# Patient Record
Sex: FEMALE | Race: WHITE
[De-identification: names, ages, dates, MRNs, and addresses within clinical notes are randomized per-mention and may not be internally consistent; named-entity substitution may affect disease eponyms.]

---

## 2018-08-29 ENCOUNTER — HOSPITAL ENCOUNTER (OUTPATIENT)
Dept: HOSPITAL 8 - RAD | Age: 53
Discharge: HOME | End: 2018-08-29
Attending: ORTHOPAEDIC SURGERY
Payer: MEDICARE

## 2018-08-29 DIAGNOSIS — M79.661: Primary | ICD-10-CM

## 2018-08-29 DIAGNOSIS — M79.662: ICD-10-CM

## 2018-08-29 DIAGNOSIS — R60.9: ICD-10-CM

## 2018-08-29 PROCEDURE — 93970 EXTREMITY STUDY: CPT

## 2019-03-31 ENCOUNTER — HOSPITAL ENCOUNTER (INPATIENT)
Dept: HOSPITAL 8 - ED | Age: 54
LOS: 3 days | Discharge: HOME | DRG: 871 | End: 2019-04-03
Attending: HOSPITALIST | Admitting: HOSPITALIST
Payer: MEDICAID

## 2019-03-31 VITALS — WEIGHT: 209.44 LBS | BODY MASS INDEX: 33.66 KG/M2 | HEIGHT: 66 IN

## 2019-03-31 VITALS — SYSTOLIC BLOOD PRESSURE: 150 MMHG | DIASTOLIC BLOOD PRESSURE: 110 MMHG

## 2019-03-31 VITALS — DIASTOLIC BLOOD PRESSURE: 108 MMHG | SYSTOLIC BLOOD PRESSURE: 159 MMHG

## 2019-03-31 VITALS — SYSTOLIC BLOOD PRESSURE: 149 MMHG | DIASTOLIC BLOOD PRESSURE: 97 MMHG

## 2019-03-31 VITALS — SYSTOLIC BLOOD PRESSURE: 156 MMHG | DIASTOLIC BLOOD PRESSURE: 108 MMHG

## 2019-03-31 VITALS — SYSTOLIC BLOOD PRESSURE: 161 MMHG | DIASTOLIC BLOOD PRESSURE: 105 MMHG

## 2019-03-31 DIAGNOSIS — A41.9: Primary | ICD-10-CM

## 2019-03-31 DIAGNOSIS — G82.50: ICD-10-CM

## 2019-03-31 DIAGNOSIS — F17.210: ICD-10-CM

## 2019-03-31 DIAGNOSIS — R65.20: ICD-10-CM

## 2019-03-31 DIAGNOSIS — Z88.8: ICD-10-CM
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
## 2019-03-31 DIAGNOSIS — S39.012A: ICD-10-CM

## 2019-03-31 DIAGNOSIS — D69.6: ICD-10-CM

## 2019-03-31 DIAGNOSIS — I44.7: ICD-10-CM

## 2019-03-31 DIAGNOSIS — Z86.73: ICD-10-CM

## 2019-03-31 DIAGNOSIS — Z80.9: ICD-10-CM

## 2019-03-31 DIAGNOSIS — E83.42: ICD-10-CM

## 2019-03-31 DIAGNOSIS — J18.1: ICD-10-CM

## 2019-03-31 DIAGNOSIS — K74.60: ICD-10-CM

## 2019-03-31 DIAGNOSIS — J84.89: ICD-10-CM
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
## 2019-03-31 DIAGNOSIS — J96.00: ICD-10-CM

## 2019-03-31 DIAGNOSIS — Z88.6: ICD-10-CM

## 2019-03-31 DIAGNOSIS — R59.9: ICD-10-CM
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
## 2019-03-31 DIAGNOSIS — E83.51: ICD-10-CM

## 2019-03-31 LAB
ALBUMIN SERPL-MCNC: 2.9 G/DL (ref 3.4–5)
ALP SERPL-CCNC: 106 U/L (ref 45–117)
ALT SERPL-CCNC: 22 U/L (ref 12–78)
ANION GAP SERPL CALC-SCNC: 9 MMOL/L (ref 5–15)
BASOPHILS # BLD AUTO: 0.02 X10^3/UL (ref 0–0.1)
BASOPHILS NFR BLD AUTO: 0 % (ref 0–1)
BILIRUB SERPL-MCNC: 2.5 MG/DL (ref 0.2–1)
CALCIUM SERPL-MCNC: 7.5 MG/DL (ref 8.5–10.1)
CHLORIDE SERPL-SCNC: 111 MMOL/L (ref 98–107)
CREAT SERPL-MCNC: 0.75 MG/DL (ref 0.55–1.02)
EOSINOPHIL # BLD AUTO: 0.03 X10^3/UL (ref 0–0.4)
EOSINOPHIL NFR BLD AUTO: 0 % (ref 1–7)
ERYTHROCYTE [DISTWIDTH] IN BLOOD BY AUTOMATED COUNT: 15 % (ref 9.6–15.2)
INR PPP: 1.19 (ref 0.93–1.1)
LYMPHOCYTES # BLD AUTO: 1.07 X10^3/UL (ref 1–3.4)
LYMPHOCYTES NFR BLD AUTO: 17 % (ref 22–44)
MCH RBC QN AUTO: 32.2 PG (ref 27–34.8)
MCHC RBC AUTO-ENTMCNC: 34.5 G/DL (ref 32.4–35.8)
MCV RBC AUTO: 93.4 FL (ref 80–100)
MD: NO
MONOCYTES # BLD AUTO: 0.55 X10^3/UL (ref 0.2–0.8)
MONOCYTES NFR BLD AUTO: 9 % (ref 2–9)
NEUTROPHILS # BLD AUTO: 4.84 X10^3/UL (ref 1.8–6.8)
NEUTROPHILS NFR BLD AUTO: 74 % (ref 42–75)
PLATELET # BLD AUTO: 105 X10^3/UL (ref 130–400)
PMV BLD AUTO: 7.8 FL (ref 7.4–10.4)
PROT SERPL-MCNC: 6.8 G/DL (ref 6.4–8.2)
PROTHROMBIN TIME: 12.4 SECONDS (ref 9.6–11.5)
RBC # BLD AUTO: 4 X10^6/UL (ref 3.82–5.3)
TROPONIN I SERPL-MCNC: < 0.015 NG/ML (ref 0–0.04)

## 2019-03-31 PROCEDURE — 83735 ASSAY OF MAGNESIUM: CPT

## 2019-03-31 PROCEDURE — 71250 CT THORAX DX C-: CPT

## 2019-03-31 PROCEDURE — 84145 PROCALCITONIN (PCT): CPT

## 2019-03-31 PROCEDURE — 87040 BLOOD CULTURE FOR BACTERIA: CPT

## 2019-03-31 PROCEDURE — 87491 CHLMYD TRACH DNA AMP PROBE: CPT

## 2019-03-31 PROCEDURE — 93005 ELECTROCARDIOGRAM TRACING: CPT

## 2019-03-31 PROCEDURE — 84484 ASSAY OF TROPONIN QUANT: CPT

## 2019-03-31 PROCEDURE — 83605 ASSAY OF LACTIC ACID: CPT

## 2019-03-31 PROCEDURE — 87081 CULTURE SCREEN ONLY: CPT

## 2019-03-31 PROCEDURE — 85610 PROTHROMBIN TIME: CPT

## 2019-03-31 PROCEDURE — 83036 HEMOGLOBIN GLYCOSYLATED A1C: CPT

## 2019-03-31 PROCEDURE — 36415 COLL VENOUS BLD VENIPUNCTURE: CPT

## 2019-03-31 PROCEDURE — 96365 THER/PROPH/DIAG IV INF INIT: CPT

## 2019-03-31 PROCEDURE — 81001 URINALYSIS AUTO W/SCOPE: CPT

## 2019-03-31 PROCEDURE — 80053 COMPREHEN METABOLIC PANEL: CPT

## 2019-03-31 PROCEDURE — 94640 AIRWAY INHALATION TREATMENT: CPT

## 2019-03-31 PROCEDURE — 71045 X-RAY EXAM CHEST 1 VIEW: CPT

## 2019-03-31 PROCEDURE — 85025 COMPLETE CBC W/AUTO DIFF WBC: CPT

## 2019-03-31 PROCEDURE — 83880 ASSAY OF NATRIURETIC PEPTIDE: CPT

## 2019-03-31 PROCEDURE — 82962 GLUCOSE BLOOD TEST: CPT

## 2019-03-31 PROCEDURE — 87400 INFLUENZA A/B EACH AG IA: CPT

## 2019-03-31 PROCEDURE — 87086 URINE CULTURE/COLONY COUNT: CPT

## 2019-03-31 PROCEDURE — 87591 N.GONORRHOEAE DNA AMP PROB: CPT

## 2019-03-31 PROCEDURE — 84443 ASSAY THYROID STIM HORMONE: CPT

## 2019-03-31 RX ADMIN — HYDRALAZINE HYDROCHLORIDE PRN MG: 20 INJECTION INTRAMUSCULAR; INTRAVENOUS at 23:25

## 2019-03-31 RX ADMIN — HEPARIN SODIUM SCH UNITS: 5000 INJECTION, SOLUTION INTRAVENOUS; SUBCUTANEOUS at 23:02

## 2019-03-31 RX ADMIN — CEFTRIAXONE SCH MLS/HR: 2 INJECTION, SOLUTION INTRAVENOUS at 20:31

## 2019-03-31 RX ADMIN — NICOTINE SCH PATCH: 14 PATCH, EXTENDED RELEASE TRANSDERMAL at 23:02

## 2019-03-31 RX ADMIN — IPRATROPIUM BROMIDE AND ALBUTEROL SULFATE SCH ML: 2.5; .5 SOLUTION RESPIRATORY (INHALATION) at 19:00

## 2019-03-31 RX ADMIN — IPRATROPIUM BROMIDE AND ALBUTEROL SULFATE SCH ML: 2.5; .5 SOLUTION RESPIRATORY (INHALATION) at 22:36

## 2019-03-31 RX ADMIN — SODIUM CHLORIDE AND POTASSIUM CHLORIDE SCH MLS/HR: .9; .15 SOLUTION INTRAVENOUS at 20:24

## 2019-03-31 RX ADMIN — GUAIFENESIN AND CODEINE PHOSPHATE PRN ML: 10; 100 LIQUID ORAL at 22:14

## 2019-03-31 RX ADMIN — AZITHROMYCIN FOR INJECTION INJECTION, POWDER, LYOPHILIZED, FOR SOLUTION SCH MLS/HR: 500 INJECTION INTRAVENOUS at 21:04

## 2019-03-31 NOTE — NUR
PT BIBA FOR C/O COUGH AND FEVERS X 2 DAYS, SOB STARTING TODAY. REPORT RECEIVED 
FROM EMS. PT STATES SOB WORSE WHEN LYING FLAT. 

ALSO NOTES GREEN VAG DISCHARGE X 1 MONTH, STATES "I NEED TO BE SEEN FOR AN STD" 


PT ATTACHED TO ALL MONITORS, EKG TAKEN ON ARRIVAL BY EDT. PER EMS, SPO2 90% ON 
ROOM AIR PTA, PT ARRIVED WEARING OXYGEN AT 4L/MIN. PT A&O, RESPS EVEN AND 
UNLABORED. NEURO INTACT. AWAITING MD AND ORDERS.

## 2019-03-31 NOTE — NUR
REPORT GIVEN TO RECEIVING NARDA TIDWELL PT AWAITING TRANSPORT TO ROOM 403-2 

-------------------------------------------------------------------------------

Addendum: 03/31/19 at 1745 by EMRE

-------------------------------------------------------------------------------

REPORT GIVEN TO RECEIVING NARDA TIDWELL PT AWAITING TRANSPORT TO ROOM 402-1

## 2019-03-31 NOTE — NUR
PT MEDICATED PER EMAR, TOLERATING WELL. PT IS A&O, RESPS EVEN AND UNLABORED, 
DOZING INTERMITTENTLY BUT AWAKENS SPONTANEOUSLY. SINUS TACH ON CARDIAC MONITOR, 
NO ECTOPY NOTED.

## 2019-04-01 VITALS — SYSTOLIC BLOOD PRESSURE: 129 MMHG | DIASTOLIC BLOOD PRESSURE: 93 MMHG

## 2019-04-01 VITALS — DIASTOLIC BLOOD PRESSURE: 115 MMHG | SYSTOLIC BLOOD PRESSURE: 167 MMHG

## 2019-04-01 VITALS — SYSTOLIC BLOOD PRESSURE: 161 MMHG | DIASTOLIC BLOOD PRESSURE: 103 MMHG

## 2019-04-01 VITALS — SYSTOLIC BLOOD PRESSURE: 145 MMHG | DIASTOLIC BLOOD PRESSURE: 97 MMHG

## 2019-04-01 VITALS — SYSTOLIC BLOOD PRESSURE: 138 MMHG | DIASTOLIC BLOOD PRESSURE: 88 MMHG

## 2019-04-01 VITALS — SYSTOLIC BLOOD PRESSURE: 144 MMHG | DIASTOLIC BLOOD PRESSURE: 85 MMHG

## 2019-04-01 LAB
ALBUMIN SERPL-MCNC: 2.8 G/DL (ref 3.4–5)
ALP SERPL-CCNC: 113 U/L (ref 45–117)
ALT SERPL-CCNC: 19 U/L (ref 12–78)
ANION GAP SERPL CALC-SCNC: 7 MMOL/L (ref 5–15)
BASOPHILS # BLD AUTO: 0.02 X10^3/UL (ref 0–0.1)
BASOPHILS NFR BLD AUTO: 0 % (ref 0–1)
BILIRUB SERPL-MCNC: 2.5 MG/DL (ref 0.2–1)
CALCIUM SERPL-MCNC: 7.5 MG/DL (ref 8.5–10.1)
CHLORIDE SERPL-SCNC: 112 MMOL/L (ref 98–107)
CREAT SERPL-MCNC: 0.51 MG/DL (ref 0.55–1.02)
EOSINOPHIL # BLD AUTO: 0.01 X10^3/UL (ref 0–0.4)
EOSINOPHIL NFR BLD AUTO: 0 % (ref 1–7)
ERYTHROCYTE [DISTWIDTH] IN BLOOD BY AUTOMATED COUNT: 15 % (ref 9.6–15.2)
LYMPHOCYTES # BLD AUTO: 0.96 X10^3/UL (ref 1–3.4)
LYMPHOCYTES NFR BLD AUTO: 16 % (ref 22–44)
MCH RBC QN AUTO: 32.7 PG (ref 27–34.8)
MCHC RBC AUTO-ENTMCNC: 34.8 G/DL (ref 32.4–35.8)
MCV RBC AUTO: 93.9 FL (ref 80–100)
MD: (no result)
MONOCYTES # BLD AUTO: 0.38 X10^3/UL (ref 0.2–0.8)
MONOCYTES NFR BLD AUTO: 6 % (ref 2–9)
NEUTROPHILS # BLD AUTO: 4.78 X10^3/UL (ref 1.8–6.8)
NEUTROPHILS NFR BLD AUTO: 78 % (ref 42–75)
PLATELET # BLD AUTO: 93 X10^3/UL (ref 130–400)
PMV BLD AUTO: 8.5 FL (ref 7.4–10.4)
PROT SERPL-MCNC: 6.8 G/DL (ref 6.4–8.2)
RBC # BLD AUTO: 4.13 X10^6/UL (ref 3.82–5.3)
TSH SERPL-ACNC: 0.55 MIU/L (ref 0.36–3.74)

## 2019-04-01 RX ADMIN — NICOTINE SCH PATCH: 14 PATCH, EXTENDED RELEASE TRANSDERMAL at 21:10

## 2019-04-01 RX ADMIN — IPRATROPIUM BROMIDE AND ALBUTEROL SULFATE SCH ML: 2.5; .5 SOLUTION RESPIRATORY (INHALATION) at 23:00

## 2019-04-01 RX ADMIN — KETOROLAC TROMETHAMINE PRN MG: 30 INJECTION, SOLUTION INTRAMUSCULAR at 17:02

## 2019-04-01 RX ADMIN — SODIUM CHLORIDE AND POTASSIUM CHLORIDE SCH MLS/HR: .9; .15 SOLUTION INTRAVENOUS at 22:22

## 2019-04-01 RX ADMIN — HYDRALAZINE HYDROCHLORIDE PRN MG: 20 INJECTION INTRAMUSCULAR; INTRAVENOUS at 09:29

## 2019-04-01 RX ADMIN — IPRATROPIUM BROMIDE AND ALBUTEROL SULFATE SCH ML: 2.5; .5 SOLUTION RESPIRATORY (INHALATION) at 07:00

## 2019-04-01 RX ADMIN — SODIUM CHLORIDE AND POTASSIUM CHLORIDE SCH MLS/HR: .9; .15 SOLUTION INTRAVENOUS at 12:38

## 2019-04-01 RX ADMIN — HEPARIN SODIUM SCH UNITS: 5000 INJECTION, SOLUTION INTRAVENOUS; SUBCUTANEOUS at 16:52

## 2019-04-01 RX ADMIN — METHYLPREDNISOLONE SODIUM SUCCINATE SCH MG: 40 INJECTION, POWDER, FOR SOLUTION INTRAMUSCULAR; INTRAVENOUS at 16:52

## 2019-04-01 RX ADMIN — KETOROLAC TROMETHAMINE PRN MG: 30 INJECTION, SOLUTION INTRAMUSCULAR at 08:48

## 2019-04-01 RX ADMIN — IPRATROPIUM BROMIDE AND ALBUTEROL SULFATE SCH ML: 2.5; .5 SOLUTION RESPIRATORY (INHALATION) at 19:15

## 2019-04-01 RX ADMIN — ACETAMINOPHEN PRN MG: 325 TABLET, FILM COATED ORAL at 08:48

## 2019-04-01 RX ADMIN — HEPARIN SODIUM SCH UNITS: 5000 INJECTION, SOLUTION INTRAVENOUS; SUBCUTANEOUS at 08:19

## 2019-04-01 RX ADMIN — CEFTRIAXONE SCH MLS/HR: 2 INJECTION, SOLUTION INTRAVENOUS at 20:00

## 2019-04-01 RX ADMIN — AZITHROMYCIN FOR INJECTION INJECTION, POWDER, LYOPHILIZED, FOR SOLUTION SCH MLS/HR: 500 INJECTION INTRAVENOUS at 21:03

## 2019-04-01 RX ADMIN — IPRATROPIUM BROMIDE AND ALBUTEROL SULFATE SCH ML: 2.5; .5 SOLUTION RESPIRATORY (INHALATION) at 02:13

## 2019-04-01 RX ADMIN — IPRATROPIUM BROMIDE AND ALBUTEROL SULFATE SCH ML: 2.5; .5 SOLUTION RESPIRATORY (INHALATION) at 15:00

## 2019-04-01 RX ADMIN — METHYLPREDNISOLONE SODIUM SUCCINATE SCH MG: 40 INJECTION, POWDER, FOR SOLUTION INTRAMUSCULAR; INTRAVENOUS at 10:35

## 2019-04-01 RX ADMIN — IPRATROPIUM BROMIDE AND ALBUTEROL SULFATE SCH ML: 2.5; .5 SOLUTION RESPIRATORY (INHALATION) at 10:16

## 2019-04-02 VITALS — SYSTOLIC BLOOD PRESSURE: 154 MMHG | DIASTOLIC BLOOD PRESSURE: 95 MMHG

## 2019-04-02 VITALS — SYSTOLIC BLOOD PRESSURE: 151 MMHG | DIASTOLIC BLOOD PRESSURE: 91 MMHG

## 2019-04-02 VITALS — DIASTOLIC BLOOD PRESSURE: 92 MMHG | SYSTOLIC BLOOD PRESSURE: 152 MMHG

## 2019-04-02 VITALS — SYSTOLIC BLOOD PRESSURE: 161 MMHG | DIASTOLIC BLOOD PRESSURE: 101 MMHG

## 2019-04-02 LAB
ALBUMIN SERPL-MCNC: 2.5 G/DL (ref 3.4–5)
ALP SERPL-CCNC: 101 U/L (ref 45–117)
ALT SERPL-CCNC: 18 U/L (ref 12–78)
ANION GAP SERPL CALC-SCNC: 9 MMOL/L (ref 5–15)
BASOPHILS # BLD AUTO: 0 X10^3/UL (ref 0–0.1)
BASOPHILS NFR BLD AUTO: 0 % (ref 0–1)
BILIRUB SERPL-MCNC: 1.2 MG/DL (ref 0.2–1)
CALCIUM SERPL-MCNC: 8.1 MG/DL (ref 8.5–10.1)
CHLORIDE SERPL-SCNC: 118 MMOL/L (ref 98–107)
CREAT SERPL-MCNC: 0.68 MG/DL (ref 0.55–1.02)
CULTURE INDICATED?: YES
EOSINOPHIL # BLD AUTO: 0 X10^3/UL (ref 0–0.4)
EOSINOPHIL NFR BLD AUTO: 0 % (ref 1–7)
ERYTHROCYTE [DISTWIDTH] IN BLOOD BY AUTOMATED COUNT: 15.2 % (ref 9.6–15.2)
EST. AVERAGE GLUCOSE BLD GHB EST-MCNC: 91 MG/DL (ref 0–126)
HBA1C MFR BLD: 4.8 % (ref 4.2–6.3)
LYMPHOCYTES # BLD AUTO: 0.24 X10^3/UL (ref 1–3.4)
LYMPHOCYTES NFR BLD AUTO: 8 % (ref 22–44)
MCH RBC QN AUTO: 32.6 PG (ref 27–34.8)
MCHC RBC AUTO-ENTMCNC: 34.3 G/DL (ref 32.4–35.8)
MCV RBC AUTO: 94.8 FL (ref 80–100)
MD: (no result)
MICROSCOPIC: (no result)
MONOCYTES # BLD AUTO: 0.05 X10^3/UL (ref 0.2–0.8)
MONOCYTES NFR BLD AUTO: 2 % (ref 2–9)
NEUTROPHILS # BLD AUTO: 2.79 X10^3/UL (ref 1.8–6.8)
NEUTROPHILS NFR BLD AUTO: 91 % (ref 42–75)
PLATELET # BLD AUTO: 95 X10^3/UL (ref 130–400)
PMV BLD AUTO: 8.9 FL (ref 7.4–10.4)
PROT SERPL-MCNC: 6.4 G/DL (ref 6.4–8.2)
RBC # BLD AUTO: 3.92 X10^6/UL (ref 3.82–5.3)

## 2019-04-02 RX ADMIN — NICOTINE SCH PATCH: 14 PATCH, EXTENDED RELEASE TRANSDERMAL at 21:00

## 2019-04-02 RX ADMIN — KETOROLAC TROMETHAMINE PRN MG: 30 INJECTION, SOLUTION INTRAMUSCULAR at 19:36

## 2019-04-02 RX ADMIN — METHYLPREDNISOLONE SODIUM SUCCINATE SCH MG: 40 INJECTION, POWDER, FOR SOLUTION INTRAMUSCULAR; INTRAVENOUS at 00:39

## 2019-04-02 RX ADMIN — AZITHROMYCIN FOR INJECTION INJECTION, POWDER, LYOPHILIZED, FOR SOLUTION SCH MLS/HR: 500 INJECTION INTRAVENOUS at 21:52

## 2019-04-02 RX ADMIN — IPRATROPIUM BROMIDE AND ALBUTEROL SULFATE SCH ML: 2.5; .5 SOLUTION RESPIRATORY (INHALATION) at 19:26

## 2019-04-02 RX ADMIN — HEPARIN SODIUM SCH UNITS: 5000 INJECTION, SOLUTION INTRAVENOUS; SUBCUTANEOUS at 10:30

## 2019-04-02 RX ADMIN — HEPARIN SODIUM SCH UNITS: 5000 INJECTION, SOLUTION INTRAVENOUS; SUBCUTANEOUS at 18:00

## 2019-04-02 RX ADMIN — GUAIFENESIN AND CODEINE PHOSPHATE PRN ML: 10; 100 LIQUID ORAL at 01:00

## 2019-04-02 RX ADMIN — HEPARIN SODIUM SCH UNITS: 5000 INJECTION, SOLUTION INTRAVENOUS; SUBCUTANEOUS at 00:51

## 2019-04-02 RX ADMIN — IPRATROPIUM BROMIDE AND ALBUTEROL SULFATE SCH ML: 2.5; .5 SOLUTION RESPIRATORY (INHALATION) at 03:00

## 2019-04-02 RX ADMIN — METHYLPREDNISOLONE SODIUM SUCCINATE SCH MG: 40 INJECTION, POWDER, FOR SOLUTION INTRAMUSCULAR; INTRAVENOUS at 10:29

## 2019-04-02 RX ADMIN — INSULIN LISPRO SCH UNITS: 100 INJECTION, SOLUTION INTRAVENOUS; SUBCUTANEOUS at 11:00

## 2019-04-02 RX ADMIN — GUAIFENESIN AND CODEINE PHOSPHATE PRN ML: 10; 100 LIQUID ORAL at 06:28

## 2019-04-02 RX ADMIN — IPRATROPIUM BROMIDE AND ALBUTEROL SULFATE SCH ML: 2.5; .5 SOLUTION RESPIRATORY (INHALATION) at 11:06

## 2019-04-02 RX ADMIN — INSULIN LISPRO SCH UNITS: 100 INJECTION, SOLUTION INTRAVENOUS; SUBCUTANEOUS at 20:18

## 2019-04-02 RX ADMIN — KETOROLAC TROMETHAMINE PRN MG: 30 INJECTION, SOLUTION INTRAMUSCULAR at 01:01

## 2019-04-02 RX ADMIN — CEFTRIAXONE SCH MLS/HR: 2 INJECTION, SOLUTION INTRAVENOUS at 20:18

## 2019-04-02 RX ADMIN — SODIUM CHLORIDE AND POTASSIUM CHLORIDE SCH MLS/HR: .9; .15 SOLUTION INTRAVENOUS at 06:24

## 2019-04-02 RX ADMIN — METHYLPREDNISOLONE SODIUM SUCCINATE SCH MG: 40 INJECTION, POWDER, FOR SOLUTION INTRAMUSCULAR; INTRAVENOUS at 18:00

## 2019-04-02 RX ADMIN — IPRATROPIUM BROMIDE AND ALBUTEROL SULFATE SCH ML: 2.5; .5 SOLUTION RESPIRATORY (INHALATION) at 07:18

## 2019-04-02 RX ADMIN — INSULIN LISPRO SCH UNITS: 100 INJECTION, SOLUTION INTRAVENOUS; SUBCUTANEOUS at 18:03

## 2019-04-02 RX ADMIN — GUAIFENESIN AND CODEINE PHOSPHATE PRN ML: 10; 100 LIQUID ORAL at 18:00

## 2019-04-02 RX ADMIN — ACETAMINOPHEN PRN MG: 325 TABLET, FILM COATED ORAL at 10:30

## 2019-04-02 RX ADMIN — IPRATROPIUM BROMIDE AND ALBUTEROL SULFATE SCH ML: 2.5; .5 SOLUTION RESPIRATORY (INHALATION) at 14:09

## 2019-04-03 VITALS — SYSTOLIC BLOOD PRESSURE: 146 MMHG | DIASTOLIC BLOOD PRESSURE: 96 MMHG

## 2019-04-03 VITALS — SYSTOLIC BLOOD PRESSURE: 148 MMHG | DIASTOLIC BLOOD PRESSURE: 92 MMHG

## 2019-04-03 RX ADMIN — HEPARIN SODIUM SCH UNITS: 5000 INJECTION, SOLUTION INTRAVENOUS; SUBCUTANEOUS at 11:32

## 2019-04-03 RX ADMIN — INSULIN LISPRO SCH UNITS: 100 INJECTION, SOLUTION INTRAVENOUS; SUBCUTANEOUS at 07:59

## 2019-04-03 RX ADMIN — METHYLPREDNISOLONE SODIUM SUCCINATE SCH MG: 40 INJECTION, POWDER, FOR SOLUTION INTRAMUSCULAR; INTRAVENOUS at 02:20

## 2019-04-03 RX ADMIN — INSULIN LISPRO SCH UNITS: 100 INJECTION, SOLUTION INTRAVENOUS; SUBCUTANEOUS at 11:33

## 2019-04-03 RX ADMIN — IPRATROPIUM BROMIDE AND ALBUTEROL SULFATE SCH ML: 2.5; .5 SOLUTION RESPIRATORY (INHALATION) at 14:26

## 2019-04-03 RX ADMIN — KETOROLAC TROMETHAMINE PRN MG: 30 INJECTION, SOLUTION INTRAMUSCULAR at 02:20

## 2019-04-03 RX ADMIN — METHYLPREDNISOLONE SODIUM SUCCINATE SCH MG: 40 INJECTION, POWDER, FOR SOLUTION INTRAMUSCULAR; INTRAVENOUS at 11:32

## 2019-04-03 RX ADMIN — IPRATROPIUM BROMIDE AND ALBUTEROL SULFATE SCH ML: 2.5; .5 SOLUTION RESPIRATORY (INHALATION) at 10:13

## 2019-04-03 RX ADMIN — IPRATROPIUM BROMIDE AND ALBUTEROL SULFATE SCH ML: 2.5; .5 SOLUTION RESPIRATORY (INHALATION) at 06:55

## 2019-04-03 RX ADMIN — HEPARIN SODIUM SCH UNITS: 5000 INJECTION, SOLUTION INTRAVENOUS; SUBCUTANEOUS at 02:00

## 2019-10-20 ENCOUNTER — HOSPITAL ENCOUNTER (EMERGENCY)
Dept: HOSPITAL 8 - ED | Age: 54
Discharge: HOME | End: 2019-10-20
Payer: MEDICARE

## 2019-10-20 VITALS — WEIGHT: 156.53 LBS | BODY MASS INDEX: 24.57 KG/M2 | HEIGHT: 67 IN

## 2019-10-20 VITALS — SYSTOLIC BLOOD PRESSURE: 171 MMHG | DIASTOLIC BLOOD PRESSURE: 113 MMHG

## 2019-10-20 DIAGNOSIS — T18.128A: Primary | ICD-10-CM

## 2019-10-20 DIAGNOSIS — Z86.73: ICD-10-CM

## 2019-10-20 PROCEDURE — 96374 THER/PROPH/DIAG INJ IV PUSH: CPT

## 2019-10-20 PROCEDURE — 74220 X-RAY XM ESOPHAGUS 1CNTRST: CPT

## 2019-10-20 PROCEDURE — 99152 MOD SED SAME PHYS/QHP 5/>YRS: CPT

## 2019-10-20 PROCEDURE — 43247 EGD REMOVE FOREIGN BODY: CPT

## 2019-10-20 PROCEDURE — 99153 MOD SED SAME PHYS/QHP EA: CPT

## 2019-10-20 PROCEDURE — 99285 EMERGENCY DEPT VISIT HI MDM: CPT

## 2019-10-20 NOTE — NUR
Patient/Caregiver given discharge instructions and they have confirmed that 
they understand the instructions.  Patient ambulatory with steady gait.



please see procedural sedation packet for detialed info.

## 2019-10-20 NOTE — NUR
PROCEDURE TIME OUT 1455, PT GIVEN 300MG OF PROPOFOL DURING PROCEDURE. TOLERATED 
WELL WITH NO COMPLICATIONS. PT ACTED. FB DISLODGED AND PART OF IT REMOVED. PER 
GI DOC SENCOND OBSTRUCTION WAS PUSHED INTO STOMACH. PT AWAKE AND ALERT ABOUT 10 
MINUTES S/P PROCEDURE. PT PROCEDURE END TIME 1520.

## 2019-10-20 NOTE — NUR
pt informing this rn that she cannot wait to go eat. RN educated pt on bland 
diet and only eating soft foods for a day or two to help prevent reocclusion. 
pt reports she cannot wait to stop at the gas station for some snacks and a 
slurpee as she has not eaten all day. Pt reminded that bland diet and jello may 
be a better option today. Pt remains on monitoring equipment. Pt not receptive 
to rn recommendations on food and chewing food more throughly. vss

## 2019-10-20 NOTE — NUR
C/O PORK STUCK IN THROAT AT 1100 YESTERDAY. PT ATTEMPTED MULTIPLE TIMES TO GET 
IT DOWN AND UP, W/O SUCCESS. PT UNABLE TO SWALLOW ANYTHING, INCLUDING OWN 
SALIVA. HX ESOPHOGEAL VARICIES. PT HX STROKE WITH RIGHT SIDE WEAKNESS, PT 
TRANSFERRED TO BED FROM  WITH SBA. CONNECTED TO MONITORING. CALL LIGHT IN 
REACH. FALL PRECAUTIONS IN PLACE.

## 2021-03-11 ENCOUNTER — HOSPITAL ENCOUNTER (EMERGENCY)
Dept: HOSPITAL 8 - ED | Age: 56
Discharge: HOME | End: 2021-03-11
Payer: MEDICARE

## 2021-03-11 VITALS — WEIGHT: 182.32 LBS | BODY MASS INDEX: 29.3 KG/M2 | HEIGHT: 66 IN

## 2021-03-11 VITALS — SYSTOLIC BLOOD PRESSURE: 138 MMHG | DIASTOLIC BLOOD PRESSURE: 82 MMHG

## 2021-03-11 DIAGNOSIS — T18.128A: Primary | ICD-10-CM
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
## 2021-03-11 DIAGNOSIS — Z20.822: ICD-10-CM

## 2021-03-11 DIAGNOSIS — F17.200: ICD-10-CM

## 2021-03-11 DIAGNOSIS — I50.9: ICD-10-CM

## 2021-03-11 DIAGNOSIS — I11.0: ICD-10-CM

## 2021-03-11 DIAGNOSIS — Z86.73: ICD-10-CM
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
## 2021-03-11 DIAGNOSIS — R11.2: ICD-10-CM

## 2021-03-11 LAB
ALBUMIN SERPL-MCNC: 2.9 G/DL (ref 3.4–5)
ALP SERPL-CCNC: 122 U/L (ref 45–117)
ALT SERPL-CCNC: 24 U/L (ref 12–78)
ANION GAP SERPL CALC-SCNC: 10 MMOL/L (ref 5–15)
APTT BLD: 29 SECONDS (ref 25–31)
BASOPHILS # BLD AUTO: 0.1 X10^3/UL (ref 0–0.1)
BASOPHILS NFR BLD AUTO: 1 % (ref 0–1)
BILIRUB SERPL-MCNC: 5.8 MG/DL (ref 0.2–1)
CALCIUM SERPL-MCNC: 8.3 MG/DL (ref 8.5–10.1)
CHLORIDE SERPL-SCNC: 112 MMOL/L (ref 98–107)
CREAT SERPL-MCNC: 0.72 MG/DL (ref 0.55–1.02)
EOSINOPHIL # BLD AUTO: 0.1 X10^3/UL (ref 0–0.4)
EOSINOPHIL NFR BLD AUTO: 2 % (ref 1–7)
ERYTHROCYTE [DISTWIDTH] IN BLOOD BY AUTOMATED COUNT: 14.1 % (ref 9.6–15.2)
INR PPP: 1.32 (ref 0.93–1.1)
LYMPHOCYTES # BLD AUTO: 1.2 X10^3/UL (ref 1–3.4)
LYMPHOCYTES NFR BLD AUTO: 25 % (ref 22–44)
MCH RBC QN AUTO: 34.4 PG (ref 27–34.8)
MCHC RBC AUTO-ENTMCNC: 34.8 G/DL (ref 32.4–35.8)
MD: NO
MONOCYTES # BLD AUTO: 0.5 X10^3/UL (ref 0.2–0.8)
MONOCYTES NFR BLD AUTO: 11 % (ref 2–9)
NEUTROPHILS # BLD AUTO: 2.8 X10^3/UL (ref 1.8–6.8)
NEUTROPHILS NFR BLD AUTO: 61 % (ref 42–75)
PLATELET # BLD AUTO: 121 X10^3/UL (ref 130–400)
PMV BLD AUTO: 9.4 FL (ref 7.4–10.4)
PROT SERPL-MCNC: 7.1 G/DL (ref 6.4–8.2)
PROTHROMBIN TIME: 14 SECONDS (ref 9.6–11.5)
RBC # BLD AUTO: 4.55 X10^6/UL (ref 3.82–5.3)

## 2021-03-11 PROCEDURE — 85730 THROMBOPLASTIN TIME PARTIAL: CPT

## 2021-03-11 PROCEDURE — 93005 ELECTROCARDIOGRAM TRACING: CPT

## 2021-03-11 PROCEDURE — 85610 PROTHROMBIN TIME: CPT

## 2021-03-11 PROCEDURE — 43247 EGD REMOVE FOREIGN BODY: CPT

## 2021-03-11 PROCEDURE — 99285 EMERGENCY DEPT VISIT HI MDM: CPT

## 2021-03-11 PROCEDURE — 80053 COMPREHEN METABOLIC PANEL: CPT

## 2021-03-11 PROCEDURE — 85025 COMPLETE CBC W/AUTO DIFF WBC: CPT

## 2021-03-11 PROCEDURE — 99152 MOD SED SAME PHYS/QHP 5/>YRS: CPT

## 2021-03-11 PROCEDURE — 96375 TX/PRO/DX INJ NEW DRUG ADDON: CPT

## 2021-03-11 PROCEDURE — 96374 THER/PROPH/DIAG INJ IV PUSH: CPT

## 2021-03-11 PROCEDURE — 96376 TX/PRO/DX INJ SAME DRUG ADON: CPT

## 2021-03-11 PROCEDURE — 99153 MOD SED SAME PHYS/QHP EA: CPT

## 2021-03-11 PROCEDURE — 87635 SARS-COV-2 COVID-19 AMP PRB: CPT

## 2021-03-11 PROCEDURE — 36415 COLL VENOUS BLD VENIPUNCTURE: CPT

## 2021-03-11 NOTE — NUR
call to Endoscopy to find out an ETA for the patients procedure. There is a 
delay with another pt. They estimate it will be another hour and a half to 2 
hours.

## 2021-03-11 NOTE — NUR
PT STATES THE NAUSEA IS BETTER BUT SHE IS COMPLAINING OF PAIN IN HER THROAT 
FROM WHERE THE MUSCLES ARE TRYING TO MOVE THE FB. ADVISED WE ARE WAITING ON GI 
AT 11:30 AND WE CANNOT GIVER HE PAIN MEDS AT THIS TIME. ALL MONITORS IN PLACE 
AND CALL LIGHT WITHIN REACH.

## 2021-03-11 NOTE — NUR
PT RESTING ON GURNEY, RESP EVEN AND UNLABORED, CALL LIGHT WITHIN REACH. 
AWAITING PRE OP FOR GI PROCEDURE.

## 2021-03-11 NOTE — NUR
PT RECOVERING FROM EDOSCOPY, SLEEPING RESP EVEN AND UNLABORED, CARDIAC MONITOR 
ON, CONTIONUS SP02 AND CYCLE VS.  BED RAILS X 2

## 2021-03-11 NOTE — NUR
PT IS RESTING IN BED. SHE COMPLAINED OF NAUSEA, MEDICATED PER EMAR. GI WILL BE 
HERE AT 11:30 FOR PROCEDURE. CALL LIGHT WITHIN REACH.

## 2021-03-11 NOTE — NUR
report to [facility] in pre-op. They will come get the pt at around 11:00. Covid 
swab collected and walked geremias.

## 2021-03-11 NOTE — NUR
PT RESTING IN BED AWAITING TRANSFER TO ENDOSCOPY FOR PROCEDURE. BELONGINGS 
GATHERED INCLUDING JEWELRY LABELED IN SMALL CLEAR CONTAINER.

## 2021-03-11 NOTE — NUR
PT IS STILL PRETTY SEDATED. SHE WAS ABLE TO WAKE UP ENOUGH TO TAKE A COUPLE OF 
SIPS OF WATER. ALL MONITORS IN PLACE. ONCE SHE IS BACK TO BASELINE WE WILL 
ARRANGE FOR TRANSPORT HOME.

## 2021-03-11 NOTE — NUR
MEDICATED PER EMAR, PROVIDED WET WASHCLOTH FOR PATIENTS FACE. SHE IS STILL 
PRETTY UNCOMFORTABLE. ADVISED OR WOULD BE DOWN TO GET HER WITHIN THE HOUR. CALL 
LIGHT WITHIN REACH.

## 2021-03-11 NOTE — NUR
PT IS AWAKE AND ALERT. SHE IS ABLE TO SIT ON THE SIDE OF THE BED AND DRINK 
FLUIDS WITHOUT COMPLICATION. CALL TO HER FRIEND JENNIFFER, HE WILL COME PICK HER 
UP. WILL CANCEL [facility].

## 2021-08-17 ENCOUNTER — HOSPITAL ENCOUNTER (INPATIENT)
Dept: HOSPITAL 8 - ED | Age: 56
LOS: 6 days | Discharge: NURSING, REHAB OR LONG-TERM CARE | DRG: 602 | End: 2021-08-23
Attending: INTERNAL MEDICINE | Admitting: FAMILY MEDICINE
Payer: MEDICARE

## 2021-08-17 VITALS — WEIGHT: 213.41 LBS | HEIGHT: 66 IN | BODY MASS INDEX: 34.3 KG/M2

## 2021-08-17 VITALS — DIASTOLIC BLOOD PRESSURE: 76 MMHG | SYSTOLIC BLOOD PRESSURE: 129 MMHG

## 2021-08-17 DIAGNOSIS — I44.7: ICD-10-CM

## 2021-08-17 DIAGNOSIS — I48.91: ICD-10-CM

## 2021-08-17 DIAGNOSIS — I50.23: ICD-10-CM

## 2021-08-17 DIAGNOSIS — K80.20: ICD-10-CM

## 2021-08-17 DIAGNOSIS — F17.210: ICD-10-CM

## 2021-08-17 DIAGNOSIS — E43: ICD-10-CM

## 2021-08-17 DIAGNOSIS — K70.30: ICD-10-CM

## 2021-08-17 DIAGNOSIS — Z82.49: ICD-10-CM

## 2021-08-17 DIAGNOSIS — Z80.42: ICD-10-CM

## 2021-08-17 DIAGNOSIS — E87.6: ICD-10-CM

## 2021-08-17 DIAGNOSIS — I27.20: ICD-10-CM

## 2021-08-17 DIAGNOSIS — Z91.14: ICD-10-CM

## 2021-08-17 DIAGNOSIS — L03.116: Primary | ICD-10-CM

## 2021-08-17 DIAGNOSIS — J44.9: ICD-10-CM

## 2021-08-17 DIAGNOSIS — I42.0: ICD-10-CM

## 2021-08-17 DIAGNOSIS — E83.42: ICD-10-CM

## 2021-08-17 DIAGNOSIS — D53.9: ICD-10-CM

## 2021-08-17 DIAGNOSIS — F12.90: ICD-10-CM

## 2021-08-17 DIAGNOSIS — A04.72: ICD-10-CM

## 2021-08-17 DIAGNOSIS — I08.1: ICD-10-CM

## 2021-08-17 DIAGNOSIS — D69.6: ICD-10-CM

## 2021-08-17 DIAGNOSIS — Z86.73: ICD-10-CM

## 2021-08-17 DIAGNOSIS — B19.20: ICD-10-CM

## 2021-08-17 DIAGNOSIS — I11.0: ICD-10-CM

## 2021-08-17 DIAGNOSIS — Z79.899: ICD-10-CM

## 2021-08-17 DIAGNOSIS — Z88.6: ICD-10-CM

## 2021-08-17 LAB
ALBUMIN SERPL-MCNC: 1.9 G/DL (ref 3.4–5)
ALP SERPL-CCNC: 152 U/L (ref 45–117)
ALT SERPL-CCNC: 24 U/L (ref 12–78)
ANION GAP SERPL CALC-SCNC: 9 MMOL/L (ref 5–15)
BASOPHILS # BLD AUTO: 0.1 X10^3/UL (ref 0–0.1)
BASOPHILS NFR BLD AUTO: 1 % (ref 0–1)
BILIRUB SERPL-MCNC: 7.1 MG/DL (ref 0.2–1)
CALCIUM SERPL-MCNC: 7.8 MG/DL (ref 8.5–10.1)
CHLORIDE SERPL-SCNC: 104 MMOL/L (ref 98–107)
CREAT SERPL-MCNC: 0.65 MG/DL (ref 0.55–1.02)
EOSINOPHIL # BLD AUTO: 0.1 X10^3/UL (ref 0–0.4)
EOSINOPHIL NFR BLD AUTO: 2 % (ref 1–7)
ERYTHROCYTE [DISTWIDTH] IN BLOOD BY AUTOMATED COUNT: 18.1 % (ref 9.6–15.2)
INR PPP: 1.33 (ref 0.93–1.1)
LYMPHOCYTES # BLD AUTO: 1.4 X10^3/UL (ref 1–3.4)
LYMPHOCYTES NFR BLD AUTO: 26 % (ref 22–44)
MCH RBC QN AUTO: 35.7 PG (ref 27–34.8)
MCHC RBC AUTO-ENTMCNC: 33.4 G/DL (ref 32.4–35.8)
MICROSCOPIC: (no result)
MICROSCOPIC: (no result)
MONOCYTES # BLD AUTO: 0.9 X10^3/UL (ref 0.2–0.8)
MONOCYTES NFR BLD AUTO: 16 % (ref 2–9)
NEUTROPHILS # BLD AUTO: 2.9 X10^3/UL (ref 1.8–6.8)
NEUTROPHILS NFR BLD AUTO: 55 % (ref 42–75)
PLATELET # BLD AUTO: 107 X10^3/UL (ref 130–400)
PMV BLD AUTO: 9.8 FL (ref 7.4–10.4)
PROT SERPL-MCNC: 6.5 G/DL (ref 6.4–8.2)
PROTHROMBIN TIME: 14 SECONDS (ref 9.6–11.5)
RBC # BLD AUTO: 4.07 X10^6/UL (ref 3.82–5.3)
TROPONIN I SERPL-MCNC: < 0.015 NG/ML (ref 0–0.04)
TROPONIN I SERPL-MCNC: < 0.015 NG/ML (ref 0–0.04)

## 2021-08-17 PROCEDURE — 83735 ASSAY OF MAGNESIUM: CPT

## 2021-08-17 PROCEDURE — 93005 ELECTROCARDIOGRAM TRACING: CPT

## 2021-08-17 PROCEDURE — 96375 TX/PRO/DX INJ NEW DRUG ADDON: CPT

## 2021-08-17 PROCEDURE — 96368 THER/DIAG CONCURRENT INF: CPT

## 2021-08-17 PROCEDURE — 96365 THER/PROPH/DIAG IV INF INIT: CPT

## 2021-08-17 PROCEDURE — 80053 COMPREHEN METABOLIC PANEL: CPT

## 2021-08-17 PROCEDURE — 84132 ASSAY OF SERUM POTASSIUM: CPT

## 2021-08-17 PROCEDURE — 87040 BLOOD CULTURE FOR BACTERIA: CPT

## 2021-08-17 PROCEDURE — 83690 ASSAY OF LIPASE: CPT

## 2021-08-17 PROCEDURE — 80307 DRUG TEST PRSMV CHEM ANLYZR: CPT

## 2021-08-17 PROCEDURE — 85610 PROTHROMBIN TIME: CPT

## 2021-08-17 PROCEDURE — 84145 PROCALCITONIN (PCT): CPT

## 2021-08-17 PROCEDURE — 84100 ASSAY OF PHOSPHORUS: CPT

## 2021-08-17 PROCEDURE — 36415 COLL VENOUS BLD VENIPUNCTURE: CPT

## 2021-08-17 PROCEDURE — 71045 X-RAY EXAM CHEST 1 VIEW: CPT

## 2021-08-17 PROCEDURE — 81001 URINALYSIS AUTO W/SCOPE: CPT

## 2021-08-17 PROCEDURE — C8929 TTE W OR WO FOL WCON,DOPPLER: HCPCS

## 2021-08-17 PROCEDURE — 81003 URINALYSIS AUTO W/O SCOPE: CPT

## 2021-08-17 PROCEDURE — 83880 ASSAY OF NATRIURETIC PEPTIDE: CPT

## 2021-08-17 PROCEDURE — 85025 COMPLETE CBC W/AUTO DIFF WBC: CPT

## 2021-08-17 PROCEDURE — 96366 THER/PROPH/DIAG IV INF ADDON: CPT

## 2021-08-17 PROCEDURE — 84484 ASSAY OF TROPONIN QUANT: CPT

## 2021-08-17 PROCEDURE — 76700 US EXAM ABDOM COMPLETE: CPT

## 2021-08-17 PROCEDURE — 80048 BASIC METABOLIC PNL TOTAL CA: CPT

## 2021-08-17 PROCEDURE — 87324 CLOSTRIDIUM AG IA: CPT

## 2021-08-17 PROCEDURE — 83605 ASSAY OF LACTIC ACID: CPT

## 2021-08-17 RX ADMIN — ACETAMINOPHEN PRN MG: 325 TABLET, FILM COATED ORAL at 22:25

## 2021-08-17 RX ADMIN — ENOXAPARIN SODIUM SCH MG: 40 INJECTION SUBCUTANEOUS at 19:51

## 2021-08-17 RX ADMIN — FUROSEMIDE SCH MG: 10 INJECTION, SOLUTION INTRAVENOUS at 19:51

## 2021-08-17 RX ADMIN — LISINOPRIL SCH MG: 10 TABLET ORAL at 19:52

## 2021-08-17 RX ADMIN — FAMOTIDINE SCH MG: 20 TABLET, FILM COATED ORAL at 22:25

## 2021-08-17 RX ADMIN — POTASSIUM CHLORIDE SCH MEQ: 20 TABLET, EXTENDED RELEASE ORAL at 22:25

## 2021-08-17 NOTE — NUR
Pt repositioned in bed, provided with extra blankets. Urine container changed. 
Pure wick in place. 

Pt on monitor, denies further needs.

## 2021-08-17 NOTE — NUR
Pt assisted into bed. BLE 3+ pitting edema with drainage. Pt reports being 
non-compliant with medication.

## 2021-08-18 VITALS — DIASTOLIC BLOOD PRESSURE: 66 MMHG | SYSTOLIC BLOOD PRESSURE: 99 MMHG

## 2021-08-18 VITALS — SYSTOLIC BLOOD PRESSURE: 124 MMHG | DIASTOLIC BLOOD PRESSURE: 86 MMHG

## 2021-08-18 VITALS — SYSTOLIC BLOOD PRESSURE: 107 MMHG | DIASTOLIC BLOOD PRESSURE: 74 MMHG

## 2021-08-18 VITALS — DIASTOLIC BLOOD PRESSURE: 68 MMHG | SYSTOLIC BLOOD PRESSURE: 118 MMHG

## 2021-08-18 VITALS — SYSTOLIC BLOOD PRESSURE: 114 MMHG | DIASTOLIC BLOOD PRESSURE: 79 MMHG

## 2021-08-18 LAB
<PLATELET ESTIMATE>: (no result)
<PLT MORPHOLOGY>: (no result)
ALBUMIN SERPL-MCNC: 1.6 G/DL (ref 3.4–5)
ALP SERPL-CCNC: 128 U/L (ref 45–117)
ALT SERPL-CCNC: 17 U/L (ref 12–78)
ANION GAP SERPL CALC-SCNC: 5 MMOL/L (ref 5–15)
ANISOCYTOSIS BLD QL SMEAR: (no result)
BASOPHILS # BLD AUTO: 0.1 X10^3/UL (ref 0–0.1)
BASOPHILS NFR BLD AUTO: 1 % (ref 0–1)
BILIRUB SERPL-MCNC: 7.5 MG/DL (ref 0.2–1)
CALCIUM SERPL-MCNC: 7.5 MG/DL (ref 8.5–10.1)
CHLORIDE SERPL-SCNC: 106 MMOL/L (ref 98–107)
CREAT SERPL-MCNC: 0.73 MG/DL (ref 0.55–1.02)
EOSINOPHIL # BLD AUTO: 0.1 X10^3/UL (ref 0–0.4)
EOSINOPHIL NFR BLD AUTO: 1 % (ref 1–7)
ERYTHROCYTE [DISTWIDTH] IN BLOOD BY AUTOMATED COUNT: 18.2 % (ref 9.6–15.2)
LYMPHOCYTES # BLD AUTO: 0.8 X10^3/UL (ref 1–3.4)
LYMPHOCYTES NFR BLD AUTO: 14 % (ref 22–44)
MACROCYTES BLD QL SMEAR: (no result)
MCH RBC QN AUTO: 36.6 PG (ref 27–34.8)
MCHC RBC AUTO-ENTMCNC: 34.4 G/DL (ref 32.4–35.8)
MONOCYTES # BLD AUTO: 0.9 X10^3/UL (ref 0.2–0.8)
MONOCYTES NFR BLD AUTO: 16 % (ref 2–9)
NEUTROPHILS # BLD AUTO: 3.9 X10^3/UL (ref 1.8–6.8)
NEUTROPHILS NFR BLD AUTO: 68 % (ref 42–75)
OVALOCYTES BLD QL SMEAR: (no result)
PLATELET # BLD AUTO: 98 X10^3/UL (ref 130–400)
PMV BLD AUTO: 10.3 FL (ref 7.4–10.4)
PROT SERPL-MCNC: 5.4 G/DL (ref 6.4–8.2)
RBC # BLD AUTO: 3.82 X10^6/UL (ref 3.82–5.3)

## 2021-08-18 RX ADMIN — PIPERACILLIN SODIUM AND TAZOBACTAM SODIUM SCH MLS/HR: 3; .375 INJECTION, POWDER, LYOPHILIZED, FOR SOLUTION INTRAVENOUS at 17:34

## 2021-08-18 RX ADMIN — FUROSEMIDE SCH MG: 10 INJECTION, SOLUTION INTRAVENOUS at 08:21

## 2021-08-18 RX ADMIN — Medication SCH MG: at 08:27

## 2021-08-18 RX ADMIN — CARVEDILOL SCH MG: 6.25 TABLET, FILM COATED ORAL at 05:29

## 2021-08-18 RX ADMIN — ACETAMINOPHEN PRN MG: 325 TABLET, FILM COATED ORAL at 10:29

## 2021-08-18 RX ADMIN — PIPERACILLIN SODIUM AND TAZOBACTAM SODIUM SCH MLS/HR: 3; .375 INJECTION, POWDER, LYOPHILIZED, FOR SOLUTION INTRAVENOUS at 10:40

## 2021-08-18 RX ADMIN — LISINOPRIL SCH MG: 10 TABLET ORAL at 08:28

## 2021-08-18 RX ADMIN — ACETAMINOPHEN PRN MG: 325 TABLET, FILM COATED ORAL at 21:01

## 2021-08-18 RX ADMIN — PIPERACILLIN SODIUM AND TAZOBACTAM SODIUM SCH MLS/HR: 3; .375 INJECTION, POWDER, LYOPHILIZED, FOR SOLUTION INTRAVENOUS at 01:56

## 2021-08-18 RX ADMIN — POTASSIUM CHLORIDE SCH MEQ: 20 TABLET, EXTENDED RELEASE ORAL at 08:27

## 2021-08-18 RX ADMIN — CARVEDILOL SCH MG: 6.25 TABLET, FILM COATED ORAL at 17:03

## 2021-08-18 RX ADMIN — FAMOTIDINE SCH MG: 20 TABLET, FILM COATED ORAL at 21:02

## 2021-08-18 RX ADMIN — ENOXAPARIN SODIUM SCH MG: 40 INJECTION SUBCUTANEOUS at 17:03

## 2021-08-18 RX ADMIN — FAMOTIDINE SCH MG: 20 TABLET, FILM COATED ORAL at 08:27

## 2021-08-18 RX ADMIN — NICOTINE SCH PATCH: 14 PATCH, EXTENDED RELEASE TRANSDERMAL at 14:46

## 2021-08-18 RX ADMIN — FUROSEMIDE SCH MG: 10 INJECTION, SOLUTION INTRAVENOUS at 17:03

## 2021-08-18 RX ADMIN — POTASSIUM CHLORIDE SCH MEQ: 20 TABLET, EXTENDED RELEASE ORAL at 21:01

## 2021-08-19 VITALS — SYSTOLIC BLOOD PRESSURE: 114 MMHG | DIASTOLIC BLOOD PRESSURE: 76 MMHG

## 2021-08-19 VITALS — DIASTOLIC BLOOD PRESSURE: 78 MMHG | SYSTOLIC BLOOD PRESSURE: 117 MMHG

## 2021-08-19 VITALS — DIASTOLIC BLOOD PRESSURE: 62 MMHG | SYSTOLIC BLOOD PRESSURE: 100 MMHG

## 2021-08-19 VITALS — SYSTOLIC BLOOD PRESSURE: 93 MMHG | DIASTOLIC BLOOD PRESSURE: 61 MMHG

## 2021-08-19 VITALS — SYSTOLIC BLOOD PRESSURE: 93 MMHG | DIASTOLIC BLOOD PRESSURE: 65 MMHG

## 2021-08-19 LAB
ANION GAP SERPL CALC-SCNC: 6 MMOL/L (ref 5–15)
BASOPHILS # BLD AUTO: 0 X10^3/UL (ref 0–0.1)
BASOPHILS NFR BLD AUTO: 1 % (ref 0–1)
CALCIUM SERPL-MCNC: 7.7 MG/DL (ref 8.5–10.1)
CHLORIDE SERPL-SCNC: 105 MMOL/L (ref 98–107)
CREAT SERPL-MCNC: 0.84 MG/DL (ref 0.55–1.02)
EOSINOPHIL # BLD AUTO: 0.1 X10^3/UL (ref 0–0.4)
EOSINOPHIL NFR BLD AUTO: 1 % (ref 1–7)
ERYTHROCYTE [DISTWIDTH] IN BLOOD BY AUTOMATED COUNT: 18.3 % (ref 9.6–15.2)
LYMPHOCYTES # BLD AUTO: 1 X10^3/UL (ref 1–3.4)
LYMPHOCYTES NFR BLD AUTO: 17 % (ref 22–44)
MCH RBC QN AUTO: 36.2 PG (ref 27–34.8)
MCHC RBC AUTO-ENTMCNC: 33.7 G/DL (ref 32.4–35.8)
MONOCYTES # BLD AUTO: 0.9 X10^3/UL (ref 0.2–0.8)
MONOCYTES NFR BLD AUTO: 16 % (ref 2–9)
NEUTROPHILS # BLD AUTO: 3.7 X10^3/UL (ref 1.8–6.8)
NEUTROPHILS NFR BLD AUTO: 65 % (ref 42–75)
PLATELET # BLD AUTO: 108 X10^3/UL (ref 130–400)
PMV BLD AUTO: 10 FL (ref 7.4–10.4)
RBC # BLD AUTO: 3.78 X10^6/UL (ref 3.82–5.3)

## 2021-08-19 RX ADMIN — LISINOPRIL SCH MG: 10 TABLET ORAL at 08:37

## 2021-08-19 RX ADMIN — FAMOTIDINE SCH MG: 20 TABLET, FILM COATED ORAL at 08:36

## 2021-08-19 RX ADMIN — ACETAMINOPHEN PRN MG: 325 TABLET, FILM COATED ORAL at 03:09

## 2021-08-19 RX ADMIN — POTASSIUM CHLORIDE SCH MEQ: 20 TABLET, EXTENDED RELEASE ORAL at 08:36

## 2021-08-19 RX ADMIN — FUROSEMIDE SCH MG: 10 INJECTION, SOLUTION INTRAMUSCULAR; INTRAVENOUS at 18:17

## 2021-08-19 RX ADMIN — CARVEDILOL SCH MG: 6.25 TABLET, FILM COATED ORAL at 18:18

## 2021-08-19 RX ADMIN — PIPERACILLIN SODIUM AND TAZOBACTAM SODIUM SCH MLS/HR: 3; .375 INJECTION, POWDER, LYOPHILIZED, FOR SOLUTION INTRAVENOUS at 05:49

## 2021-08-19 RX ADMIN — CARVEDILOL SCH MG: 6.25 TABLET, FILM COATED ORAL at 05:49

## 2021-08-19 RX ADMIN — ENOXAPARIN SODIUM SCH MG: 40 INJECTION SUBCUTANEOUS at 18:17

## 2021-08-19 RX ADMIN — PIPERACILLIN SODIUM AND TAZOBACTAM SODIUM SCH MLS/HR: 3; .375 INJECTION, POWDER, LYOPHILIZED, FOR SOLUTION INTRAVENOUS at 18:17

## 2021-08-19 RX ADMIN — POTASSIUM CHLORIDE SCH MEQ: 20 TABLET, EXTENDED RELEASE ORAL at 21:13

## 2021-08-19 RX ADMIN — PIPERACILLIN SODIUM AND TAZOBACTAM SODIUM SCH MLS/HR: 3; .375 INJECTION, POWDER, LYOPHILIZED, FOR SOLUTION INTRAVENOUS at 11:59

## 2021-08-19 RX ADMIN — FUROSEMIDE SCH MG: 10 INJECTION, SOLUTION INTRAVENOUS at 08:37

## 2021-08-19 RX ADMIN — FAMOTIDINE SCH MG: 20 TABLET, FILM COATED ORAL at 21:13

## 2021-08-19 RX ADMIN — Medication SCH MG: at 08:37

## 2021-08-19 RX ADMIN — NICOTINE SCH PATCH: 14 PATCH, EXTENDED RELEASE TRANSDERMAL at 15:08

## 2021-08-19 RX ADMIN — PIPERACILLIN SODIUM AND TAZOBACTAM SODIUM SCH MLS/HR: 3; .375 INJECTION, POWDER, LYOPHILIZED, FOR SOLUTION INTRAVENOUS at 00:19

## 2021-08-20 VITALS — SYSTOLIC BLOOD PRESSURE: 122 MMHG | DIASTOLIC BLOOD PRESSURE: 87 MMHG

## 2021-08-20 VITALS — DIASTOLIC BLOOD PRESSURE: 76 MMHG | SYSTOLIC BLOOD PRESSURE: 119 MMHG

## 2021-08-20 VITALS — SYSTOLIC BLOOD PRESSURE: 112 MMHG | DIASTOLIC BLOOD PRESSURE: 88 MMHG

## 2021-08-20 VITALS — DIASTOLIC BLOOD PRESSURE: 82 MMHG | SYSTOLIC BLOOD PRESSURE: 112 MMHG

## 2021-08-20 VITALS — DIASTOLIC BLOOD PRESSURE: 90 MMHG | SYSTOLIC BLOOD PRESSURE: 132 MMHG

## 2021-08-20 VITALS — SYSTOLIC BLOOD PRESSURE: 124 MMHG | DIASTOLIC BLOOD PRESSURE: 90 MMHG

## 2021-08-20 LAB
ANION GAP SERPL CALC-SCNC: 7 MMOL/L (ref 5–15)
CALCIUM SERPL-MCNC: 7.3 MG/DL (ref 8.5–10.1)
CHLORIDE SERPL-SCNC: 105 MMOL/L (ref 98–107)
CLOSTRIDIUM DIFFICILE ANTIGEN: POSITIVE
CLOSTRIDIUM DIFFICILE TOXIN: NEGATIVE
CREAT SERPL-MCNC: 0.73 MG/DL (ref 0.55–1.02)

## 2021-08-20 RX ADMIN — POTASSIUM CHLORIDE SCH MEQ: 20 TABLET, EXTENDED RELEASE ORAL at 21:14

## 2021-08-20 RX ADMIN — LISINOPRIL SCH MG: 10 TABLET ORAL at 07:47

## 2021-08-20 RX ADMIN — FAMOTIDINE SCH MG: 20 TABLET, FILM COATED ORAL at 07:46

## 2021-08-20 RX ADMIN — PIPERACILLIN SODIUM AND TAZOBACTAM SODIUM SCH MLS/HR: 3; .375 INJECTION, POWDER, LYOPHILIZED, FOR SOLUTION INTRAVENOUS at 00:36

## 2021-08-20 RX ADMIN — FUROSEMIDE SCH MG: 10 INJECTION, SOLUTION INTRAMUSCULAR; INTRAVENOUS at 17:09

## 2021-08-20 RX ADMIN — POTASSIUM CHLORIDE SCH MEQ: 20 TABLET, EXTENDED RELEASE ORAL at 07:46

## 2021-08-20 RX ADMIN — Medication SCH MG: at 07:47

## 2021-08-20 RX ADMIN — FAMOTIDINE SCH MG: 20 TABLET, FILM COATED ORAL at 21:14

## 2021-08-20 RX ADMIN — ENOXAPARIN SODIUM SCH MG: 40 INJECTION SUBCUTANEOUS at 17:09

## 2021-08-20 RX ADMIN — NICOTINE SCH PATCH: 14 PATCH, EXTENDED RELEASE TRANSDERMAL at 14:10

## 2021-08-20 RX ADMIN — CARVEDILOL SCH MG: 6.25 TABLET, FILM COATED ORAL at 06:11

## 2021-08-20 RX ADMIN — FUROSEMIDE SCH MG: 10 INJECTION, SOLUTION INTRAMUSCULAR; INTRAVENOUS at 07:45

## 2021-08-20 RX ADMIN — PIPERACILLIN SODIUM AND TAZOBACTAM SODIUM SCH MLS/HR: 3; .375 INJECTION, POWDER, LYOPHILIZED, FOR SOLUTION INTRAVENOUS at 23:54

## 2021-08-20 RX ADMIN — PIPERACILLIN SODIUM AND TAZOBACTAM SODIUM SCH MLS/HR: 3; .375 INJECTION, POWDER, LYOPHILIZED, FOR SOLUTION INTRAVENOUS at 11:30

## 2021-08-20 RX ADMIN — PIPERACILLIN SODIUM AND TAZOBACTAM SODIUM SCH MLS/HR: 3; .375 INJECTION, POWDER, LYOPHILIZED, FOR SOLUTION INTRAVENOUS at 17:10

## 2021-08-20 RX ADMIN — CARVEDILOL SCH MG: 6.25 TABLET, FILM COATED ORAL at 17:10

## 2021-08-20 RX ADMIN — PIPERACILLIN SODIUM AND TAZOBACTAM SODIUM SCH MLS/HR: 3; .375 INJECTION, POWDER, LYOPHILIZED, FOR SOLUTION INTRAVENOUS at 06:11

## 2021-08-21 VITALS — SYSTOLIC BLOOD PRESSURE: 119 MMHG | DIASTOLIC BLOOD PRESSURE: 79 MMHG

## 2021-08-21 VITALS — DIASTOLIC BLOOD PRESSURE: 84 MMHG | SYSTOLIC BLOOD PRESSURE: 118 MMHG

## 2021-08-21 VITALS — DIASTOLIC BLOOD PRESSURE: 79 MMHG | SYSTOLIC BLOOD PRESSURE: 122 MMHG

## 2021-08-21 VITALS — DIASTOLIC BLOOD PRESSURE: 77 MMHG | SYSTOLIC BLOOD PRESSURE: 112 MMHG

## 2021-08-21 VITALS — SYSTOLIC BLOOD PRESSURE: 117 MMHG | DIASTOLIC BLOOD PRESSURE: 81 MMHG

## 2021-08-21 VITALS — SYSTOLIC BLOOD PRESSURE: 121 MMHG | DIASTOLIC BLOOD PRESSURE: 80 MMHG

## 2021-08-21 LAB
ANION GAP SERPL CALC-SCNC: 6 MMOL/L (ref 5–15)
BASOPHILS # BLD AUTO: 0.1 X10^3/UL (ref 0–0.1)
BASOPHILS NFR BLD AUTO: 1 % (ref 0–1)
CALCIUM SERPL-MCNC: 8.2 MG/DL (ref 8.5–10.1)
CHLORIDE SERPL-SCNC: 100 MMOL/L (ref 98–107)
CREAT SERPL-MCNC: 0.76 MG/DL (ref 0.55–1.02)
EOSINOPHIL # BLD AUTO: 0.1 X10^3/UL (ref 0–0.4)
EOSINOPHIL NFR BLD AUTO: 2 % (ref 1–7)
ERYTHROCYTE [DISTWIDTH] IN BLOOD BY AUTOMATED COUNT: 18.5 % (ref 9.6–15.2)
LYMPHOCYTES # BLD AUTO: 1.4 X10^3/UL (ref 1–3.4)
LYMPHOCYTES NFR BLD AUTO: 23 % (ref 22–44)
MCH RBC QN AUTO: 37 PG (ref 27–34.8)
MCHC RBC AUTO-ENTMCNC: 34.3 G/DL (ref 32.4–35.8)
MONOCYTES # BLD AUTO: 1.2 X10^3/UL (ref 0.2–0.8)
MONOCYTES NFR BLD AUTO: 20 % (ref 2–9)
NEUTROPHILS # BLD AUTO: 3.2 X10^3/UL (ref 1.8–6.8)
NEUTROPHILS NFR BLD AUTO: 54 % (ref 42–75)
PLATELET # BLD AUTO: 173 X10^3/UL (ref 130–400)
PMV BLD AUTO: 10.2 FL (ref 7.4–10.4)
RBC # BLD AUTO: 3.71 X10^6/UL (ref 3.82–5.3)

## 2021-08-21 RX ADMIN — Medication SCH MG: at 07:49

## 2021-08-21 RX ADMIN — SPIRONOLACTONE SCH MG: 25 TABLET ORAL at 07:49

## 2021-08-21 RX ADMIN — POTASSIUM CHLORIDE SCH MEQ: 20 TABLET, EXTENDED RELEASE ORAL at 07:49

## 2021-08-21 RX ADMIN — ENOXAPARIN SODIUM SCH MG: 40 INJECTION SUBCUTANEOUS at 16:39

## 2021-08-21 RX ADMIN — PIPERACILLIN SODIUM AND TAZOBACTAM SODIUM SCH MLS/HR: 3; .375 INJECTION, POWDER, LYOPHILIZED, FOR SOLUTION INTRAVENOUS at 12:10

## 2021-08-21 RX ADMIN — NICOTINE SCH PATCH: 14 PATCH, EXTENDED RELEASE TRANSDERMAL at 14:55

## 2021-08-21 RX ADMIN — FAMOTIDINE SCH MG: 20 TABLET, FILM COATED ORAL at 07:49

## 2021-08-21 RX ADMIN — VANCOMYCIN HYDROCHLORIDE SCH MG: 1 INJECTION, POWDER, LYOPHILIZED, FOR SOLUTION INTRAVENOUS at 18:15

## 2021-08-21 RX ADMIN — CARVEDILOL SCH MG: 6.25 TABLET, FILM COATED ORAL at 05:30

## 2021-08-21 RX ADMIN — VANCOMYCIN HYDROCHLORIDE SCH MG: 1 INJECTION, POWDER, LYOPHILIZED, FOR SOLUTION INTRAVENOUS at 23:57

## 2021-08-21 RX ADMIN — CARVEDILOL SCH MG: 6.25 TABLET, FILM COATED ORAL at 16:39

## 2021-08-21 RX ADMIN — FUROSEMIDE SCH MG: 10 INJECTION, SOLUTION INTRAMUSCULAR; INTRAVENOUS at 16:39

## 2021-08-21 RX ADMIN — FUROSEMIDE SCH MG: 10 INJECTION, SOLUTION INTRAMUSCULAR; INTRAVENOUS at 07:49

## 2021-08-21 RX ADMIN — PIPERACILLIN SODIUM AND TAZOBACTAM SODIUM SCH MLS/HR: 3; .375 INJECTION, POWDER, LYOPHILIZED, FOR SOLUTION INTRAVENOUS at 18:15

## 2021-08-21 RX ADMIN — MAGNESIUM OXIDE SCH MG: 400 TABLET ORAL at 07:49

## 2021-08-21 RX ADMIN — PIPERACILLIN SODIUM AND TAZOBACTAM SODIUM SCH MLS/HR: 3; .375 INJECTION, POWDER, LYOPHILIZED, FOR SOLUTION INTRAVENOUS at 05:30

## 2021-08-21 RX ADMIN — LISINOPRIL SCH MG: 10 TABLET ORAL at 07:49

## 2021-08-21 RX ADMIN — MAGNESIUM OXIDE SCH MG: 400 TABLET ORAL at 20:24

## 2021-08-21 RX ADMIN — FAMOTIDINE SCH MG: 20 TABLET, FILM COATED ORAL at 20:24

## 2021-08-21 RX ADMIN — POTASSIUM CHLORIDE SCH MEQ: 20 TABLET, EXTENDED RELEASE ORAL at 20:24

## 2021-08-21 RX ADMIN — PIPERACILLIN SODIUM AND TAZOBACTAM SODIUM SCH MLS/HR: 3; .375 INJECTION, POWDER, LYOPHILIZED, FOR SOLUTION INTRAVENOUS at 23:57

## 2021-08-22 VITALS — DIASTOLIC BLOOD PRESSURE: 83 MMHG | SYSTOLIC BLOOD PRESSURE: 120 MMHG

## 2021-08-22 VITALS — DIASTOLIC BLOOD PRESSURE: 68 MMHG | SYSTOLIC BLOOD PRESSURE: 101 MMHG

## 2021-08-22 VITALS — SYSTOLIC BLOOD PRESSURE: 107 MMHG | DIASTOLIC BLOOD PRESSURE: 75 MMHG

## 2021-08-22 VITALS — SYSTOLIC BLOOD PRESSURE: 110 MMHG | DIASTOLIC BLOOD PRESSURE: 74 MMHG

## 2021-08-22 VITALS — SYSTOLIC BLOOD PRESSURE: 105 MMHG | DIASTOLIC BLOOD PRESSURE: 74 MMHG

## 2021-08-22 VITALS — SYSTOLIC BLOOD PRESSURE: 105 MMHG | DIASTOLIC BLOOD PRESSURE: 72 MMHG

## 2021-08-22 LAB
ALBUMIN SERPL-MCNC: 1.5 G/DL (ref 3.4–5)
ALP SERPL-CCNC: 119 U/L (ref 45–117)
ALT SERPL-CCNC: 14 U/L (ref 12–78)
ANION GAP SERPL CALC-SCNC: 4 MMOL/L (ref 5–15)
BASOPHILS # BLD AUTO: 0.1 X10^3/UL (ref 0–0.1)
BASOPHILS NFR BLD AUTO: 1 % (ref 0–1)
BILIRUB SERPL-MCNC: 4.1 MG/DL (ref 0.2–1)
CALCIUM SERPL-MCNC: 8.5 MG/DL (ref 8.5–10.1)
CHLORIDE SERPL-SCNC: 99 MMOL/L (ref 98–107)
CREAT SERPL-MCNC: 0.76 MG/DL (ref 0.55–1.02)
EOSINOPHIL # BLD AUTO: 0.2 X10^3/UL (ref 0–0.4)
EOSINOPHIL NFR BLD AUTO: 2 % (ref 1–7)
ERYTHROCYTE [DISTWIDTH] IN BLOOD BY AUTOMATED COUNT: 18.3 % (ref 9.6–15.2)
LYMPHOCYTES # BLD AUTO: 1.6 X10^3/UL (ref 1–3.4)
LYMPHOCYTES NFR BLD AUTO: 21 % (ref 22–44)
MCH RBC QN AUTO: 36.7 PG (ref 27–34.8)
MCHC RBC AUTO-ENTMCNC: 34 G/DL (ref 32.4–35.8)
MONOCYTES # BLD AUTO: 1.4 X10^3/UL (ref 0.2–0.8)
MONOCYTES NFR BLD AUTO: 18 % (ref 2–9)
NEUTROPHILS # BLD AUTO: 4.4 X10^3/UL (ref 1.8–6.8)
NEUTROPHILS NFR BLD AUTO: 58 % (ref 42–75)
PLATELET # BLD AUTO: 199 X10^3/UL (ref 130–400)
PMV BLD AUTO: 9.6 FL (ref 7.4–10.4)
PROT SERPL-MCNC: 6 G/DL (ref 6.4–8.2)
RBC # BLD AUTO: 3.77 X10^6/UL (ref 3.82–5.3)

## 2021-08-22 RX ADMIN — VANCOMYCIN HYDROCHLORIDE SCH MG: 1 INJECTION, POWDER, LYOPHILIZED, FOR SOLUTION INTRAVENOUS at 17:23

## 2021-08-22 RX ADMIN — MAGNESIUM OXIDE SCH MG: 400 TABLET ORAL at 07:56

## 2021-08-22 RX ADMIN — NICOTINE SCH PATCH: 14 PATCH, EXTENDED RELEASE TRANSDERMAL at 15:03

## 2021-08-22 RX ADMIN — LISINOPRIL SCH MG: 10 TABLET ORAL at 07:56

## 2021-08-22 RX ADMIN — PIPERACILLIN SODIUM AND TAZOBACTAM SODIUM SCH MLS/HR: 3; .375 INJECTION, POWDER, LYOPHILIZED, FOR SOLUTION INTRAVENOUS at 06:08

## 2021-08-22 RX ADMIN — FAMOTIDINE SCH MG: 20 TABLET, FILM COATED ORAL at 20:48

## 2021-08-22 RX ADMIN — ENOXAPARIN SODIUM SCH MG: 40 INJECTION SUBCUTANEOUS at 17:23

## 2021-08-22 RX ADMIN — FUROSEMIDE SCH MG: 10 INJECTION, SOLUTION INTRAMUSCULAR; INTRAVENOUS at 17:23

## 2021-08-22 RX ADMIN — POTASSIUM CHLORIDE SCH MEQ: 20 TABLET, EXTENDED RELEASE ORAL at 20:43

## 2021-08-22 RX ADMIN — SPIRONOLACTONE SCH MG: 25 TABLET ORAL at 07:56

## 2021-08-22 RX ADMIN — VANCOMYCIN HYDROCHLORIDE SCH MG: 1 INJECTION, POWDER, LYOPHILIZED, FOR SOLUTION INTRAVENOUS at 06:09

## 2021-08-22 RX ADMIN — VANCOMYCIN HYDROCHLORIDE SCH MG: 1 INJECTION, POWDER, LYOPHILIZED, FOR SOLUTION INTRAVENOUS at 12:06

## 2021-08-22 RX ADMIN — FAMOTIDINE SCH MG: 20 TABLET, FILM COATED ORAL at 07:56

## 2021-08-22 RX ADMIN — PIPERACILLIN SODIUM AND TAZOBACTAM SODIUM SCH MLS/HR: 3; .375 INJECTION, POWDER, LYOPHILIZED, FOR SOLUTION INTRAVENOUS at 12:06

## 2021-08-22 RX ADMIN — FUROSEMIDE SCH MG: 10 INJECTION, SOLUTION INTRAMUSCULAR; INTRAVENOUS at 07:56

## 2021-08-22 RX ADMIN — POTASSIUM CHLORIDE SCH MEQ: 20 TABLET, EXTENDED RELEASE ORAL at 07:41

## 2021-08-22 RX ADMIN — CARVEDILOL SCH MG: 6.25 TABLET, FILM COATED ORAL at 06:09

## 2021-08-22 RX ADMIN — MAGNESIUM OXIDE SCH MG: 400 TABLET ORAL at 20:48

## 2021-08-22 RX ADMIN — Medication SCH MG: at 07:56

## 2021-08-22 RX ADMIN — CARVEDILOL SCH MG: 6.25 TABLET, FILM COATED ORAL at 17:24

## 2021-08-22 RX ADMIN — PIPERACILLIN SODIUM AND TAZOBACTAM SODIUM SCH MLS/HR: 3; .375 INJECTION, POWDER, LYOPHILIZED, FOR SOLUTION INTRAVENOUS at 17:24

## 2021-08-23 VITALS — DIASTOLIC BLOOD PRESSURE: 79 MMHG | SYSTOLIC BLOOD PRESSURE: 115 MMHG

## 2021-08-23 VITALS — SYSTOLIC BLOOD PRESSURE: 123 MMHG | DIASTOLIC BLOOD PRESSURE: 79 MMHG

## 2021-08-23 VITALS — DIASTOLIC BLOOD PRESSURE: 70 MMHG | SYSTOLIC BLOOD PRESSURE: 106 MMHG

## 2021-08-23 VITALS — DIASTOLIC BLOOD PRESSURE: 82 MMHG | SYSTOLIC BLOOD PRESSURE: 119 MMHG

## 2021-08-23 RX ADMIN — PIPERACILLIN SODIUM AND TAZOBACTAM SODIUM SCH MLS/HR: 3; .375 INJECTION, POWDER, LYOPHILIZED, FOR SOLUTION INTRAVENOUS at 05:51

## 2021-08-23 RX ADMIN — VANCOMYCIN HYDROCHLORIDE SCH MG: 1 INJECTION, POWDER, LYOPHILIZED, FOR SOLUTION INTRAVENOUS at 11:44

## 2021-08-23 RX ADMIN — Medication SCH MG: at 08:21

## 2021-08-23 RX ADMIN — MAGNESIUM OXIDE SCH MG: 400 TABLET ORAL at 08:20

## 2021-08-23 RX ADMIN — VANCOMYCIN HYDROCHLORIDE SCH MG: 1 INJECTION, POWDER, LYOPHILIZED, FOR SOLUTION INTRAVENOUS at 05:51

## 2021-08-23 RX ADMIN — VANCOMYCIN HYDROCHLORIDE SCH MG: 1 INJECTION, POWDER, LYOPHILIZED, FOR SOLUTION INTRAVENOUS at 16:50

## 2021-08-23 RX ADMIN — FUROSEMIDE SCH MG: 10 INJECTION, SOLUTION INTRAMUSCULAR; INTRAVENOUS at 16:52

## 2021-08-23 RX ADMIN — PIPERACILLIN SODIUM AND TAZOBACTAM SODIUM SCH MLS/HR: 3; .375 INJECTION, POWDER, LYOPHILIZED, FOR SOLUTION INTRAVENOUS at 11:44

## 2021-08-23 RX ADMIN — CARVEDILOL SCH MG: 6.25 TABLET, FILM COATED ORAL at 05:51

## 2021-08-23 RX ADMIN — FAMOTIDINE SCH MG: 20 TABLET, FILM COATED ORAL at 08:21

## 2021-08-23 RX ADMIN — CARVEDILOL SCH MG: 6.25 TABLET, FILM COATED ORAL at 16:50

## 2021-08-23 RX ADMIN — POTASSIUM CHLORIDE SCH MEQ: 20 TABLET, EXTENDED RELEASE ORAL at 08:21

## 2021-08-23 RX ADMIN — SPIRONOLACTONE SCH MG: 25 TABLET ORAL at 08:20

## 2021-08-23 RX ADMIN — PIPERACILLIN SODIUM AND TAZOBACTAM SODIUM SCH MLS/HR: 3; .375 INJECTION, POWDER, LYOPHILIZED, FOR SOLUTION INTRAVENOUS at 00:22

## 2021-08-23 RX ADMIN — NICOTINE SCH PATCH: 14 PATCH, EXTENDED RELEASE TRANSDERMAL at 13:42

## 2021-08-23 RX ADMIN — ENOXAPARIN SODIUM SCH MG: 40 INJECTION SUBCUTANEOUS at 16:50

## 2021-08-23 RX ADMIN — LISINOPRIL SCH MG: 10 TABLET ORAL at 08:21

## 2021-08-23 RX ADMIN — VANCOMYCIN HYDROCHLORIDE SCH MG: 1 INJECTION, POWDER, LYOPHILIZED, FOR SOLUTION INTRAVENOUS at 00:22

## 2021-08-23 RX ADMIN — FUROSEMIDE SCH MG: 10 INJECTION, SOLUTION INTRAMUSCULAR; INTRAVENOUS at 08:20

## 2022-04-10 ENCOUNTER — HOSPITAL ENCOUNTER (INPATIENT)
Facility: MEDICAL CENTER | Age: 57
LOS: 3 days | DRG: 314 | End: 2022-04-13
Attending: EMERGENCY MEDICINE | Admitting: HOSPITALIST
Payer: MEDICARE

## 2022-04-10 ENCOUNTER — APPOINTMENT (OUTPATIENT)
Dept: RADIOLOGY | Facility: MEDICAL CENTER | Age: 57
DRG: 314 | End: 2022-04-10
Attending: EMERGENCY MEDICINE
Payer: MEDICARE

## 2022-04-10 ENCOUNTER — APPOINTMENT (OUTPATIENT)
Dept: RADIOLOGY | Facility: MEDICAL CENTER | Age: 57
DRG: 314 | End: 2022-04-10
Attending: HOSPITALIST
Payer: MEDICARE

## 2022-04-10 DIAGNOSIS — I50.23 ACUTE ON CHRONIC SYSTOLIC HEART FAILURE (HCC): ICD-10-CM

## 2022-04-10 DIAGNOSIS — I50.9 ACUTE ON CHRONIC CONGESTIVE HEART FAILURE, UNSPECIFIED HEART FAILURE TYPE (HCC): ICD-10-CM

## 2022-04-10 DIAGNOSIS — E83.51 HYPOCALCEMIA: ICD-10-CM

## 2022-04-10 DIAGNOSIS — I42.0 DILATED CARDIOMYOPATHY (HCC): ICD-10-CM

## 2022-04-10 DIAGNOSIS — S91.104A OPEN WOUND OF FIFTH TOE OF RIGHT FOOT, INITIAL ENCOUNTER: ICD-10-CM

## 2022-04-10 DIAGNOSIS — E87.6 HYPOKALEMIA: ICD-10-CM

## 2022-04-10 PROBLEM — R60.0 LEG EDEMA: Status: ACTIVE | Noted: 2022-04-10

## 2022-04-10 PROBLEM — R74.8 ELEVATED LIVER ENZYMES: Status: ACTIVE | Noted: 2022-04-10

## 2022-04-10 PROBLEM — Z86.73 HISTORY OF CVA (CEREBROVASCULAR ACCIDENT): Status: ACTIVE | Noted: 2022-04-10

## 2022-04-10 PROBLEM — D69.6 THROMBOCYTOPENIA (HCC): Status: ACTIVE | Noted: 2022-04-10

## 2022-04-10 PROBLEM — E66.9 OBESITY (BMI 30.0-34.9): Status: ACTIVE | Noted: 2022-04-10

## 2022-04-10 PROBLEM — D75.89 MACROCYTOSIS: Status: ACTIVE | Noted: 2022-04-10

## 2022-04-10 PROBLEM — F15.10 METHAMPHETAMINE ABUSE (HCC): Status: ACTIVE | Noted: 2022-04-10

## 2022-04-10 PROBLEM — R06.02 SHORTNESS OF BREATH: Status: ACTIVE | Noted: 2022-04-10

## 2022-04-10 PROBLEM — E43 SEVERE PROTEIN-CALORIE MALNUTRITION (HCC): Status: ACTIVE | Noted: 2022-04-10

## 2022-04-10 LAB
ALBUMIN SERPL BCP-MCNC: 1.8 G/DL (ref 3.2–4.9)
ALBUMIN/GLOB SERPL: 0.5 G/DL
ALP SERPL-CCNC: 116 U/L (ref 30–99)
ALT SERPL-CCNC: 19 U/L (ref 2–50)
ANION GAP SERPL CALC-SCNC: 10 MMOL/L (ref 7–16)
AST SERPL-CCNC: 49 U/L (ref 12–45)
BASOPHILS # BLD AUTO: 1.4 % (ref 0–1.8)
BASOPHILS # BLD: 0.07 K/UL (ref 0–0.12)
BILIRUB SERPL-MCNC: 5.9 MG/DL (ref 0.1–1.5)
BUN SERPL-MCNC: 5 MG/DL (ref 8–22)
CA-I SERPL-SCNC: 1.1 MMOL/L (ref 1.1–1.3)
CALCIUM SERPL-MCNC: 6.7 MG/DL (ref 8.5–10.5)
CHLORIDE SERPL-SCNC: 111 MMOL/L (ref 96–112)
CO2 SERPL-SCNC: 20 MMOL/L (ref 20–33)
CREAT SERPL-MCNC: 0.22 MG/DL (ref 0.5–1.4)
D DIMER PPP IA.FEU-MCNC: 2.22 UG/ML (FEU) (ref 0–0.5)
EKG IMPRESSION: NORMAL
EOSINOPHIL # BLD AUTO: 0.14 K/UL (ref 0–0.51)
EOSINOPHIL NFR BLD: 2.8 % (ref 0–6.9)
ERYTHROCYTE [DISTWIDTH] IN BLOOD BY AUTOMATED COUNT: 64.8 FL (ref 35.9–50)
GFR SERPLBLD CREATININE-BSD FMLA CKD-EPI: 133 ML/MIN/1.73 M 2
GLOBULIN SER CALC-MCNC: 3.6 G/DL (ref 1.9–3.5)
GLUCOSE SERPL-MCNC: 94 MG/DL (ref 65–99)
HCT VFR BLD AUTO: 37.5 % (ref 37–47)
HGB BLD-MCNC: 12.3 G/DL (ref 12–16)
IMM GRANULOCYTES # BLD AUTO: 0.02 K/UL (ref 0–0.11)
IMM GRANULOCYTES NFR BLD AUTO: 0.4 % (ref 0–0.9)
LYMPHOCYTES # BLD AUTO: 1.76 K/UL (ref 1–4.8)
LYMPHOCYTES NFR BLD: 35.5 % (ref 22–41)
MAGNESIUM SERPL-MCNC: 0.9 MG/DL (ref 1.5–2.5)
MCH RBC QN AUTO: 35.8 PG (ref 27–33)
MCHC RBC AUTO-ENTMCNC: 32.8 G/DL (ref 33.6–35)
MCV RBC AUTO: 109 FL (ref 81.4–97.8)
MONOCYTES # BLD AUTO: 0.61 K/UL (ref 0–0.85)
MONOCYTES NFR BLD AUTO: 12.3 % (ref 0–13.4)
NEUTROPHILS # BLD AUTO: 2.36 K/UL (ref 2–7.15)
NEUTROPHILS NFR BLD: 47.6 % (ref 44–72)
NRBC # BLD AUTO: 0 K/UL
NRBC BLD-RTO: 0 /100 WBC
NT-PROBNP SERPL IA-MCNC: 901 PG/ML (ref 0–125)
PLATELET # BLD AUTO: 81 K/UL (ref 164–446)
PMV BLD AUTO: 11.7 FL (ref 9–12.9)
POTASSIUM SERPL-SCNC: 2.5 MMOL/L (ref 3.6–5.5)
POTASSIUM SERPL-SCNC: 3.9 MMOL/L (ref 3.6–5.5)
PROT SERPL-MCNC: 5.4 G/DL (ref 6–8.2)
RBC # BLD AUTO: 3.44 M/UL (ref 4.2–5.4)
SODIUM SERPL-SCNC: 141 MMOL/L (ref 135–145)
TROPONIN T SERPL-MCNC: 23 NG/L (ref 6–19)
WBC # BLD AUTO: 5 K/UL (ref 4.8–10.8)

## 2022-04-10 PROCEDURE — 71045 X-RAY EXAM CHEST 1 VIEW: CPT

## 2022-04-10 PROCEDURE — 93005 ELECTROCARDIOGRAM TRACING: CPT | Performed by: EMERGENCY MEDICINE

## 2022-04-10 PROCEDURE — 96368 THER/DIAG CONCURRENT INF: CPT

## 2022-04-10 PROCEDURE — 700117 HCHG RX CONTRAST REV CODE 255: Performed by: HOSPITALIST

## 2022-04-10 PROCEDURE — 770020 HCHG ROOM/CARE - TELE (206)

## 2022-04-10 PROCEDURE — 93005 ELECTROCARDIOGRAM TRACING: CPT

## 2022-04-10 PROCEDURE — 84484 ASSAY OF TROPONIN QUANT: CPT

## 2022-04-10 PROCEDURE — 700111 HCHG RX REV CODE 636 W/ 250 OVERRIDE (IP): Performed by: HOSPITALIST

## 2022-04-10 PROCEDURE — 83880 ASSAY OF NATRIURETIC PEPTIDE: CPT

## 2022-04-10 PROCEDURE — 36415 COLL VENOUS BLD VENIPUNCTURE: CPT

## 2022-04-10 PROCEDURE — 85379 FIBRIN DEGRADATION QUANT: CPT

## 2022-04-10 PROCEDURE — 96366 THER/PROPH/DIAG IV INF ADDON: CPT

## 2022-04-10 PROCEDURE — 99285 EMERGENCY DEPT VISIT HI MDM: CPT

## 2022-04-10 PROCEDURE — 80053 COMPREHEN METABOLIC PANEL: CPT

## 2022-04-10 PROCEDURE — 85025 COMPLETE CBC W/AUTO DIFF WBC: CPT

## 2022-04-10 PROCEDURE — 700102 HCHG RX REV CODE 250 W/ 637 OVERRIDE(OP): Performed by: EMERGENCY MEDICINE

## 2022-04-10 PROCEDURE — 71275 CT ANGIOGRAPHY CHEST: CPT | Mod: ME

## 2022-04-10 PROCEDURE — A9270 NON-COVERED ITEM OR SERVICE: HCPCS | Performed by: HOSPITALIST

## 2022-04-10 PROCEDURE — 83735 ASSAY OF MAGNESIUM: CPT

## 2022-04-10 PROCEDURE — A9270 NON-COVERED ITEM OR SERVICE: HCPCS | Performed by: EMERGENCY MEDICINE

## 2022-04-10 PROCEDURE — 96365 THER/PROPH/DIAG IV INF INIT: CPT

## 2022-04-10 PROCEDURE — 700111 HCHG RX REV CODE 636 W/ 250 OVERRIDE (IP): Performed by: EMERGENCY MEDICINE

## 2022-04-10 PROCEDURE — 700102 HCHG RX REV CODE 250 W/ 637 OVERRIDE(OP): Performed by: HOSPITALIST

## 2022-04-10 PROCEDURE — 84132 ASSAY OF SERUM POTASSIUM: CPT

## 2022-04-10 PROCEDURE — 99222 1ST HOSP IP/OBS MODERATE 55: CPT | Mod: AI | Performed by: HOSPITALIST

## 2022-04-10 PROCEDURE — 82330 ASSAY OF CALCIUM: CPT

## 2022-04-10 RX ORDER — BISACODYL 10 MG
10 SUPPOSITORY, RECTAL RECTAL
Status: DISCONTINUED | OUTPATIENT
Start: 2022-04-10 | End: 2022-04-13 | Stop reason: HOSPADM

## 2022-04-10 RX ORDER — AMOXICILLIN 250 MG
2 CAPSULE ORAL 2 TIMES DAILY
Status: DISCONTINUED | OUTPATIENT
Start: 2022-04-10 | End: 2022-04-13 | Stop reason: HOSPADM

## 2022-04-10 RX ORDER — PROMETHAZINE HYDROCHLORIDE 25 MG/1
12.5-25 TABLET ORAL EVERY 4 HOURS PRN
Status: DISCONTINUED | OUTPATIENT
Start: 2022-04-10 | End: 2022-04-13 | Stop reason: HOSPADM

## 2022-04-10 RX ORDER — PROCHLORPERAZINE EDISYLATE 5 MG/ML
5-10 INJECTION INTRAMUSCULAR; INTRAVENOUS EVERY 4 HOURS PRN
Status: DISCONTINUED | OUTPATIENT
Start: 2022-04-10 | End: 2022-04-13 | Stop reason: HOSPADM

## 2022-04-10 RX ORDER — POTASSIUM CHLORIDE 20 MEQ/1
40 TABLET, EXTENDED RELEASE ORAL ONCE
Status: COMPLETED | OUTPATIENT
Start: 2022-04-10 | End: 2022-04-10

## 2022-04-10 RX ORDER — PROMETHAZINE HYDROCHLORIDE 25 MG/1
12.5-25 SUPPOSITORY RECTAL EVERY 4 HOURS PRN
Status: DISCONTINUED | OUTPATIENT
Start: 2022-04-10 | End: 2022-04-13 | Stop reason: HOSPADM

## 2022-04-10 RX ORDER — ONDANSETRON 4 MG/1
4 TABLET, ORALLY DISINTEGRATING ORAL EVERY 4 HOURS PRN
Status: DISCONTINUED | OUTPATIENT
Start: 2022-04-10 | End: 2022-04-13 | Stop reason: HOSPADM

## 2022-04-10 RX ORDER — ONDANSETRON 2 MG/ML
4 INJECTION INTRAMUSCULAR; INTRAVENOUS EVERY 4 HOURS PRN
Status: DISCONTINUED | OUTPATIENT
Start: 2022-04-10 | End: 2022-04-13 | Stop reason: HOSPADM

## 2022-04-10 RX ORDER — POLYETHYLENE GLYCOL 3350 17 G/17G
1 POWDER, FOR SOLUTION ORAL
Status: DISCONTINUED | OUTPATIENT
Start: 2022-04-10 | End: 2022-04-13 | Stop reason: HOSPADM

## 2022-04-10 RX ORDER — POTASSIUM CHLORIDE 7.45 MG/ML
10 INJECTION INTRAVENOUS ONCE
Status: COMPLETED | OUTPATIENT
Start: 2022-04-10 | End: 2022-04-10

## 2022-04-10 RX ORDER — SPIRONOLACTONE 25 MG/1
50 TABLET ORAL
Status: DISCONTINUED | OUTPATIENT
Start: 2022-04-10 | End: 2022-04-12

## 2022-04-10 RX ORDER — MAGNESIUM SULFATE HEPTAHYDRATE 40 MG/ML
2 INJECTION, SOLUTION INTRAVENOUS ONCE
Status: COMPLETED | OUTPATIENT
Start: 2022-04-10 | End: 2022-04-10

## 2022-04-10 RX ORDER — ACETAMINOPHEN 325 MG/1
650 TABLET ORAL EVERY 6 HOURS PRN
Status: DISCONTINUED | OUTPATIENT
Start: 2022-04-10 | End: 2022-04-13 | Stop reason: HOSPADM

## 2022-04-10 RX ORDER — HYDRALAZINE HYDROCHLORIDE 20 MG/ML
10 INJECTION INTRAMUSCULAR; INTRAVENOUS EVERY 4 HOURS PRN
Status: DISCONTINUED | OUTPATIENT
Start: 2022-04-10 | End: 2022-04-13 | Stop reason: HOSPADM

## 2022-04-10 RX ADMIN — MAGNESIUM SULFATE HEPTAHYDRATE 2 G: 40 INJECTION, SOLUTION INTRAVENOUS at 10:36

## 2022-04-10 RX ADMIN — RIVAROXABAN 10 MG: 10 TABLET, FILM COATED ORAL at 14:49

## 2022-04-10 RX ADMIN — IOHEXOL 60 ML: 350 INJECTION, SOLUTION INTRAVENOUS at 17:19

## 2022-04-10 RX ADMIN — MAGNESIUM SULFATE HEPTAHYDRATE 2 G: 40 INJECTION, SOLUTION INTRAVENOUS at 09:46

## 2022-04-10 RX ADMIN — POTASSIUM CHLORIDE 40 MEQ: 20 TABLET, EXTENDED RELEASE ORAL at 09:06

## 2022-04-10 RX ADMIN — SPIRONOLACTONE 50 MG: 25 TABLET, FILM COATED ORAL at 09:45

## 2022-04-10 RX ADMIN — POTASSIUM CHLORIDE 10 MEQ: 10 INJECTION, SOLUTION INTRAVENOUS at 09:05

## 2022-04-10 ASSESSMENT — ENCOUNTER SYMPTOMS
ABDOMINAL PAIN: 0
BLOOD IN STOOL: 0
NERVOUS/ANXIOUS: 0
VOMITING: 0
COUGH: 0
PALPITATIONS: 0
EYE DISCHARGE: 0
HEADACHES: 0
BACK PAIN: 0
DIZZINESS: 0
CHILLS: 0
SHORTNESS OF BREATH: 1
FEVER: 0
SPUTUM PRODUCTION: 0
STRIDOR: 0
CONSTIPATION: 0
SPEECH CHANGE: 0
NAUSEA: 0
DIARRHEA: 0

## 2022-04-10 ASSESSMENT — LIFESTYLE VARIABLES
TOTAL SCORE: 0
HAVE PEOPLE ANNOYED YOU BY CRITICIZING YOUR DRINKING: NO
SUBSTANCE_ABUSE: 1
TOTAL SCORE: 0
HOW MANY TIMES IN THE PAST YEAR HAVE YOU HAD 5 OR MORE DRINKS IN A DAY: 0
CONSUMPTION TOTAL: NEGATIVE
DO YOU DRINK ALCOHOL: NO
EVER HAD A DRINK FIRST THING IN THE MORNING TO STEADY YOUR NERVES TO GET RID OF A HANGOVER: NO
ON A TYPICAL DAY WHEN YOU DRINK ALCOHOL HOW MANY DRINKS DO YOU HAVE: 0
AVERAGE NUMBER OF DAYS PER WEEK YOU HAVE A DRINK CONTAINING ALCOHOL: 0
TOTAL SCORE: 0
EVER FELT BAD OR GUILTY ABOUT YOUR DRINKING: NO
HAVE YOU EVER FELT YOU SHOULD CUT DOWN ON YOUR DRINKING: NO

## 2022-04-10 ASSESSMENT — COGNITIVE AND FUNCTIONAL STATUS - GENERAL
TOILETING: A LOT
STANDING UP FROM CHAIR USING ARMS: TOTAL
MOVING FROM LYING ON BACK TO SITTING ON SIDE OF FLAT BED: A LOT
DRESSING REGULAR UPPER BODY CLOTHING: A LITTLE
PERSONAL GROOMING: A LITTLE
CLIMB 3 TO 5 STEPS WITH RAILING: TOTAL
TURNING FROM BACK TO SIDE WHILE IN FLAT BAD: A LOT
SUGGESTED CMS G CODE MODIFIER MOBILITY: CM
WALKING IN HOSPITAL ROOM: TOTAL
DAILY ACTIVITIY SCORE: 16
MOVING TO AND FROM BED TO CHAIR: A LOT
DRESSING REGULAR LOWER BODY CLOTHING: A LOT
MOBILITY SCORE: 9
HELP NEEDED FOR BATHING: A LOT
SUGGESTED CMS G CODE MODIFIER DAILY ACTIVITY: CK

## 2022-04-10 ASSESSMENT — COPD QUESTIONNAIRES
HAVE YOU SMOKED AT LEAST 100 CIGARETTES IN YOUR ENTIRE LIFE: YES
DURING THE PAST 4 WEEKS HOW MUCH DID YOU FEEL SHORT OF BREATH: NONE/LITTLE OF THE TIME
DO YOU EVER COUGH UP ANY MUCUS OR PHLEGM?: NO/ONLY WITH OCCASIONAL COLDS OR INFECTIONS
COPD SCREENING SCORE: 3

## 2022-04-10 ASSESSMENT — PATIENT HEALTH QUESTIONNAIRE - PHQ9
SUM OF ALL RESPONSES TO PHQ QUESTIONS 1-9: 19
SUM OF ALL RESPONSES TO PHQ9 QUESTIONS 1 AND 2: 5
3. TROUBLE FALLING OR STAYING ASLEEP OR SLEEPING TOO MUCH: NEARLY EVERY DAY
6. FEELING BAD ABOUT YOURSELF - OR THAT YOU ARE A FAILURE OR HAVE LET YOURSELF OR YOUR FAMILY DOWN: MORE THAN HALF THE DAYS
8. MOVING OR SPEAKING SO SLOWLY THAT OTHER PEOPLE COULD HAVE NOTICED. OR THE OPPOSITE, BEING SO FIGETY OR RESTLESS THAT YOU HAVE BEEN MOVING AROUND A LOT MORE THAN USUAL: SEVERAL DAYS
9. THOUGHTS THAT YOU WOULD BE BETTER OFF DEAD, OR OF HURTING YOURSELF: NOT AT ALL
5. POOR APPETITE OR OVEREATING: NEARLY EVERY DAY
2. FEELING DOWN, DEPRESSED, IRRITABLE, OR HOPELESS: NEARLY EVERY DAY
4. FEELING TIRED OR HAVING LITTLE ENERGY: NEARLY EVERY DAY
7. TROUBLE CONCENTRATING ON THINGS, SUCH AS READING THE NEWSPAPER OR WATCHING TELEVISION: MORE THAN HALF THE DAYS
1. LITTLE INTEREST OR PLEASURE IN DOING THINGS: MORE THAN HALF THE DAYS

## 2022-04-10 ASSESSMENT — FIBROSIS 4 INDEX
FIB4 SCORE: 7.91
FIB4 SCORE: 7.91

## 2022-04-10 ASSESSMENT — PAIN DESCRIPTION - PAIN TYPE: TYPE: ACUTE PAIN

## 2022-04-10 NOTE — ASSESSMENT & PLAN NOTE
According to the patient patient had brain surgery in 2012 for a brain tumor  States she can only walk short distances.  PT and OT

## 2022-04-10 NOTE — H&P
"Hospital Medicine History & Physical Note    Date of Service  4/10/2022    Primary Care Physician  No primary care provider on file.  \"I use the ER's!\"      Code Status  Full Code    Chief Complaint  Chief Complaint   Patient presents with   • Shortness of Breath     BIBA for SOB and FVO for the past 4 days. States she hasnt' been taking her cardiac meds for the past few months. Given 4 nitro tablets en route       History of Presenting Illness  Maral Herrera is a 56 y.o. female with a past medical history of noncompliance, dilated cardiomyopathy, methamphetamine use, questionable liver injury, prior stroke after craniotomy for brain tumor, tobacco use who presented 4/10/2022 with shortness of breath over the last few days.  She states she has been having ongoing bilateral leg swelling.  She states she is mostly bedbound and uses a wheelchair but can walk a few feet.  She has been having increasing shortness of breath over the past week and has been without her medications since December.  She states she does not follow-up with anybody out side the emergency room and gets her medications from the emergency rooms.  Patient denies any fevers she is been having slight cough.  She used methamphetamines smoking it from a glass pipe 2 days ago.  Patient was given nitroglycerin on EMS arrival and states she felt better breathing.  She states that she cut her right fifth toe stubbing it in a closet door.  She has been having some drainage and of an left shin wound.  She denies any chest pain.    I discussed the plan of care with patient and bedside RN.    Review of Systems  Review of Systems   Constitutional: Negative for chills and fever.   Eyes: Negative for discharge.   Respiratory: Positive for shortness of breath. Negative for cough, sputum production and stridor.    Cardiovascular: Positive for leg swelling. Negative for chest pain and palpitations.   Gastrointestinal: Negative for abdominal pain, blood in stool, " "constipation, diarrhea, nausea and vomiting.   Genitourinary: Negative for dysuria and hematuria.   Musculoskeletal: Negative for back pain and joint pain.   Skin: Negative for rash.   Neurological: Negative for dizziness, speech change and headaches.   Psychiatric/Behavioral: Positive for substance abuse. The patient is not nervous/anxious.        Past Medical History   has a past medical history of Brain tumor (HCC) () and Congestive heart failure (HCC).  Stroke during brain surgery .  Chronic physical debility after stroke per patient.  Dilated cardiomyopathy per [place] records.  Methamphetamine use, tobacco use    Surgical History   has a past surgical history that includes craniotomy tumor ().  \"A abdominal shunt\"    Family History  Father  from prostate cancer  Mother is alive and lives [facility]  Family history reviewed with patient. There is no family history that is pertinent to the chief complaint.     Social History   reports that she has been smoking. She has never used smokeless tobacco. She reports current alcohol use. She reports current drug use.  States she has been  twice.  No children.  Smokes up to 4 cigarettes a day.  Denies alcohol use.  Used methamphetamines 1 day ago smoking it.  States she shares a apartment with [employer].    Allergies  No Known Allergies    Medications  None   Denies taking any medications for the past 4 months but has some empty packets that contained Aldactone, naproxen, Coreg, and furosemide.    Physical Exam  Temp:  [36.3 °C (97.4 °F)] 36.3 °C (97.4 °F)  Pulse:  [107] 107  Resp:  [16] 16  BP: (137)/(90) 137/90  SpO2:  [93 %] 93 %  Blood Pressure: 137/90   Temperature: 36.3 °C (97.4 °F)   Pulse: (!) 107   Respiration: 16   Pulse Oximetry: 93 %       Physical Exam  Vitals reviewed.   Constitutional:       Appearance: Normal appearance. She is obese. She is not diaphoretic.   HENT:      Head: Normocephalic and atraumatic.      Nose: " Nose normal.      Mouth/Throat:      Mouth: Mucous membranes are moist.      Pharynx: No oropharyngeal exudate.   Eyes:      General: Scleral icterus present.         Right eye: No discharge.         Left eye: No discharge.      Extraocular Movements: Extraocular movements intact.      Conjunctiva/sclera: Conjunctivae normal.   Cardiovascular:      Rate and Rhythm: Normal rate and regular rhythm.      Pulses:           Radial pulses are 2+ on the right side and 2+ on the left side.        Dorsalis pedis pulses are 2+ on the right side and 2+ on the left side.      Heart sounds: No murmur heard.  Pulmonary:      Effort: Pulmonary effort is normal. No respiratory distress.      Breath sounds: Normal breath sounds. No wheezing or rales.   Abdominal:      General: Bowel sounds are normal. There is no distension.      Palpations: Abdomen is soft.   Musculoskeletal:         General: Tenderness present. No swelling.      Cervical back: No tenderness. No muscular tenderness.      Right lower leg: Edema (2+ entire legs bilaterally) present.      Left lower leg: Edema present.   Lymphadenopathy:      Cervical: No cervical adenopathy.   Skin:     Coloration: Skin is jaundiced. Skin is not pale.      Comments: Chronic venous stasis changes bilateral legs.  Left anterior shin with healing wound with some light yellow/clear drainage.  Right fifth toe with blood in between webbing of fourth and fifth toe.   Neurological:      General: No focal deficit present.      Mental Status: She is alert and oriented to person, place, and time.      Cranial Nerves: No cranial nerve deficit.   Psychiatric:         Mood and Affect: Mood normal.         Speech: Speech normal.         Behavior: Behavior normal.         Cognition and Memory: Memory is impaired.      Comments: Appears to have difficult insight into her past medical history and slightly slow in response         Laboratory:  Recent Labs     04/10/22  0700   WBC 5.0   RBC 3.44*  "  HEMOGLOBIN 12.3   HEMATOCRIT 37.5   .0*   MCH 35.8*   MCHC 32.8*   RDW 64.8*   PLATELETCT 81*   MPV 11.7     Recent Labs     04/10/22  0751   SODIUM 141   POTASSIUM 2.5*   CHLORIDE 111   CO2 20   GLUCOSE 94   BUN 5*   CREATININE 0.22*   CALCIUM 6.7*     Recent Labs     04/10/22  0751   ALTSGPT 19   ASTSGOT 49*   ALKPHOSPHAT 116*   TBILIRUBIN 5.9*   GLUCOSE 94         Recent Labs     04/10/22  0751   NTPROBNP 901*         Recent Labs     04/10/22  0751   TROPONINT 23*       Imaging:  DX-CHEST-PORTABLE (1 VIEW)   Final Result      1.  Cardiac silhouette enlargement.   2.  No acute abnormality.      EC-ECHOCARDIOGRAM COMPLETE W/O CONT    (Results Pending)       Assessment/Plan:  I anticipate this patient will require at least two midnights for appropriate medical management, necessitating inpatient admission.    * Hypokalemia- (present on admission)  Assessment & Plan  K:2.5  Replete and monitor labs  Check mg and replace    Thrombocytopenia (HCC)  Assessment & Plan  Suspect chronic liver etiology  Monitor cbc    Severe protein-calorie malnutrition (HCC)  Assessment & Plan  Albumin 1.8  Unsure if chronic liver insult/damage.  Monitor.    Shortness of breath  Assessment & Plan  Dilated cardiomyopathy, Off medications since December, methamphetamine use, medical noncompliance, low albumin.  Multiple etiology possible.  Diurectics as BP and labs tolerate.  Stop smoking meth and tobacco encouraged.    History of CVA (cerebrovascular accident)  Assessment & Plan  \"during my brain surgery in 2012 for a brain tumor, but it wasn't removed\"  States she can only walk short distances.    Dilated cardiomyopathy (HCC)  Assessment & Plan  Per [facility] records.  Obtain Echocardiogram, check TSH  Aldactone. Correct potassium prior to lasix  Monitor vitals, labs, I/O's    Leg edema  Assessment & Plan  Elevated BNP (~900)  Echo ordered  Correct potassium prior to starting lasix.  Start aldactone " "now.    Macrocytosis  Assessment & Plan  Fatty liver vs other.  Recommend outpatient follow up for B12 and folate evaluation.  Start MVI and B12 in house for supplement.    Methamphetamine abuse (HCC)  Assessment & Plan  \"It was sitting around our home and I have 4 male roommates, I smoked it in a glass pipe.\"  Cessation encouraged and educated her on harms of smoking.    Hypocalcemia  Assessment & Plan  Corrected calcium 8.5  monitor    Obesity (BMI 30.0-34.9)  Assessment & Plan  Body mass index is 32.28 kg/m².    Elevated liver enzymes  Assessment & Plan  Check U/S RUQ  Elevate tbili:5.9  Monitor CMP  Congestion vs other.  Outpatient hepatitis panel if not improving.      VTE prophylaxis: SCDs/TEDs and Xarelto 10mg oral daily for ppx  "

## 2022-04-10 NOTE — ED NOTES
Report called to tele RN, coming to bedside for transport, pt resting in bed, VSS on RA, GCS 15, NAD

## 2022-04-10 NOTE — ASSESSMENT & PLAN NOTE
Patient states she has history of alcoholism years ago    labs showed mildly elevated liver enzymes and total bilirubin 8  Check INR  Ultrasound showed signs of cirrhosis  Needs to follow-up with GI as outpatient

## 2022-04-10 NOTE — ASSESSMENT & PLAN NOTE
Drug abuse and liver and heart failure   albumin 1.8  Nutrition consulted   encouraged the patient to quit drinking and drugs  Monitor.

## 2022-04-10 NOTE — PROGRESS NOTES
Pt arrived to unit via 1330 with this RN at 1330. Pt oriented to room, unit, and plan of care. Tele-monitor placed and monitor room notified. All questions answered at this time. Call light within reach; fall precautions in place.

## 2022-04-10 NOTE — ED TRIAGE NOTES
"Chief Complaint   Patient presents with   • Shortness of Breath     BIBA for SOB and FVO for the past 4 days. States she hasnt' been taking her cardiac meds for the past few months. Given 4 nitro tablets en route     Pt on RA on arrival, states she feels better after the nitro. PT alert and oriented, generalized edema noted. States she hasnt been on her meds but has used meth this morning.     /90   Pulse (!) 107   Temp 36.3 °C (97.4 °F) (Temporal)   Resp 16   Ht 1.676 m (5' 6\")   Wt 90.7 kg (200 lb)   SpO2 93%   BMI 32.28 kg/m²     "

## 2022-04-10 NOTE — ED NOTES
Pt resting in bed, VSS on RA, GCS 15, NAD, aware POC to wait for results, will continue to monitor.

## 2022-04-10 NOTE — ASSESSMENT & PLAN NOTE
With exacerbation and mild pulmonary edema   dilated cardiomyopathy,   History noncompliance, off medications since December, methamphetamine use, medical noncompliance, low albumin.  Diuretics Lasix orally at this time, patient is on room air  Continue lisinopril, aldactone   Start metoprolol XL  EKG showed left bundle branch block   Echo EF 15%  cardiology consulted, medication management, outpt follow up, drug cessation

## 2022-04-10 NOTE — ASSESSMENT & PLAN NOTE
Redness and drainage coming from her r shine   wound care.  Doxycycline for infection and cellulitis

## 2022-04-10 NOTE — ASSESSMENT & PLAN NOTE
Fatty liver vs other.  Recommend outpatient follow up for B12 and folate evaluation.  Start MVI and B12 in house for supplement.

## 2022-04-10 NOTE — PROGRESS NOTES
4 Eyes Skin Assessment Completed by DANNY Morgan and DANNY Torres.    Head WDL  Ears WDL  Nose WDL  Mouth WDL  Neck WDL  Breast/Chest WDL  Moist red rash under right breast, moisture under left breast  Shoulder Blades WDL  Spine WDL  (R) Arm/Elbow/Hand WDL  (L) Arm/Elbow/Hand WDL  Abdomen WDL  Groin Redness and Rash  Scrotum/Coccyx/Buttocks WDL  (R) Leg Redness and Edema  (L) Leg Redness, Abrasion, Weeping and Edema  (R) Heel/Foot/Toe Redness and Discoloration 5th toe bruised, scabbed, red, swollen  (L) Heel/Foot/Toe Redness and Scab          Devices In Places Tele Box and Pulse Ox      Interventions In Place Pillows, Heels Loaded W/Pillows and Pressure Redistribution Mattress    Possible Skin Injury Yes    Pictures Uploaded Into Epic Yes  Wound Consult Placed Yes  RN Wound Prevention Protocol Ordered Yes

## 2022-04-10 NOTE — ED PROVIDER NOTES
ED Provider Note    Scribed for Laura Cordero M.D. by Richie Bojorquez. 4/10/2022  7:26 AM    Means of arrival: EMS  History obtained from: Patient  History limited by: None      CHIEF COMPLAINT  Chief Complaint   Patient presents with   • Shortness of Breath     BIBA for SOB and FVO for the past 4 days. States she hasnt' been taking her cardiac meds for the past few months. Given 4 nitro tablets en route       HPI  Maral Herrera is a 56 y.o. female who presents to the Emergency Department via EMS for shortness of breath. Patient has a history of heart failure, and reports worsening dyspnea and leg swelling in the last 4 days. She states she has not been compliant with her medications for the last 4 months. She has not been taking her Lasix as she has difficulty getting up frequently to urinate as she uses a wheelchair and has a difficult living situation. Patient has an associated cough, but denies any vomiting, diarrhea, or fevers. She admits to using methamphetamine.     REVIEW OF SYSTEMS  Pertinent positive include shortness of breath, cough. Pertinent negative include no vomiting, diarrhea, or fevers. All other systems reviewed and are negative.      PAST MEDICAL HISTORY   has a past medical history of Brain tumor (HCC) (2012) and Congestive heart failure (HCC).    SOCIAL HISTORY  Social History     Tobacco Use   • Smoking status: Current Some Day Smoker   • Smokeless tobacco: Never Used   Vaping Use   • Vaping Use: Never used   Substance and Sexual Activity   • Alcohol use: Yes     Comment: 4 wine coolers a week   • Drug use: Yes     Comment: edibles, meth   • Sexual activity: Not noted       SURGICAL HISTORY   has a past surgical history that includes craniotomy tumor (2012).    CURRENT MEDICATIONS  Home Medications     Reviewed by Vipul Clark (Pharmacy Tech) on 04/10/22 at 0835  Med List Status: Complete   Medication Last Dose Status        Patient Terry Taking any Medications                  "      ALLERGIES  No Known Allergies    PHYSICAL EXAM   VITAL SIGNS: /90   Pulse (!) 107   Temp 36.3 °C (97.4 °F) (Temporal)   Resp 16   Ht 1.676 m (5' 6\")   Wt 90.7 kg (200 lb)   SpO2 93%   BMI 32.28 kg/m²    Constitutional: Non-toxic appearing, Alert in no apparent distress.  HENT: Normocephalic, Atraumatic. Bilateral external ears normal. Nose normal.  Moist mucous membranes.  Oropharynx clear.  Eyes: Pupils are equal and reactive. Conjunctiva normal.   Neck: Supple, full range of motion  Heart: Regular rate and rhythm.  No murmurs.    Lungs: No respiratory distress, normal work of breathing.   Abdomen Soft, no distention.  No tenderness to palpation.  Musculoskeletal: 2+ pitting edema to bilateral lower extremities with some overlying erythema. 2+ DP pulses bilaterally. Atraumatic. No obvious deformities noted.    Skin: Warm, Dry.  No erythema, No rash.   Neurologic: Alert and oriented x3. Moving all extremities spontaneously without focal deficits.  Psychiatric: Affect normal, Mood normal, Appears appropriate and not intoxicated.      DIAGNOSTIC STUDIES    EKG  Results for orders placed or performed during the hospital encounter of 04/10/22   EKG   Result Value Ref Range    Report       [facility] Emergency Dept.    Test Date:  2022-04-10  Pt Name:    VALENTÍN MILAN                Department: ER  MRN:        [id number]                      Room:       RD 04  Gender:     Female                       Technician: [id number]  :        [id number]                   Requested By:ER TRIAGE PROTOCOL  Order #:    [id number]                    Reading MD: Laura Cordero MD    Measurements  Intervals                                Axis  Rate:       104                          P:          87  OK:         166                          QRS:        -27  QRSD:       165                          T:          43  QT:         416  QTc:        548    Interpretive Statements  Sinus tachycardia  Probable left " "atrial enlargement  Left bundle branch block  No ST or T wave change  No previous ECG available for comparison  Electronically Signed On 4- 8:33:04 PDT by Laura Cordero MD           LABS  Personally reviewed by me  Labs Reviewed   CBC WITH DIFFERENTIAL - Abnormal; Notable for the following components:       Result Value    RBC 3.44 (*)     .0 (*)     MCH 35.8 (*)     MCHC 32.8 (*)     RDW 64.8 (*)     Platelet Count 81 (*)     All other components within normal limits   PROBRAIN NATRIURETIC PEPTIDE, NT - Abnormal; Notable for the following components:    NT-proBNP 901 (*)     All other components within normal limits    Narrative:     Recollect: Specimen hemolyzed.  Notified:  RN [id number]   TROPONIN - Abnormal; Notable for the following components:    Troponin T 23 (*)     All other components within normal limits    Narrative:     Recollect: Specimen hemolyzed.  Notified:  RN [id number]   COMP METABOLIC PANEL - Abnormal; Notable for the following components:    Potassium 2.5 (*)     Bun 5 (*)     Creatinine 0.22 (*)     Calcium 6.7 (*)     AST(SGOT) 49 (*)     Alkaline Phosphatase 116 (*)     Total Bilirubin 5.9 (*)     Albumin 1.8 (*)     Total Protein 5.4 (*)     Globulin 3.6 (*)     All other components within normal limits    Narrative:     Recollect: Specimen hemolyzed.  Notified:  RN [id number]   ESTIMATED GFR    Narrative:     Recollect: Specimen hemolyzed.  Notified:  RN [id number]   MAGNESIUM   IONIZED CALCIUM         RADIOLOGY  Personally reviewed by me  DX-CHEST-PORTABLE (1 VIEW)   Final Result      1.  Cardiac silhouette enlargement.   2.  No acute abnormality.            ED COURSE  Vitals:    04/10/22 0659 04/10/22 0702   BP:  137/90   Pulse:  (!) 107   Resp:  16   Temp:  36.3 °C (97.4 °F)   TempSrc:  Temporal   SpO2:  93%   Weight: 90.7 kg (200 lb)    Height: 1.676 m (5' 6\")          Medications administered:  Medications   potassium chloride SA (Kdur) tablet 40 mEq (has no administration in time range) "   potassium chloride (KCL) ivpb 10 mEq (has no administration in time range)       Old records personally reviewed:  Patient's records show that she is normally seen at [place], and was last there on 12/01/21.     7:26 AM Patient seen and examined at bedside. The patient presents with shortness of breath. Ordered for DX-chest, CBC w/ diff, BNP, Troponin, CMP, EKG to evaluate.       MEDICAL DECISION MAKING  Patient with history of heart failure who presents with increasing shortness of breath and leg swelling over the last 4 days in the setting of not taking her medication in a few months.  She has reassuring vital signs on arrival and does not have any hypoxia or respiratory distress.  Her EKG shows left bundle branch block which is old.  No evidence of acute ischemia or arrhythmia.  Labs are concerning for multiple electrolyte derangements including hypokalemia and hypomagnesemia.  Calcium is also low but corrects to normal with albumin.  Patient has mild elevation of her troponin and BNP however no concern for ACS.  Chest x-ray does not show pneumonia or pulmonary edema.  Patient will need to be restarted on diuretic medication however needs correction of her electrolyte issues first.  We will plan to admit for further treatment and monitoring.    8:54 AM Patient is resting comfortably with normal vital signs.  Updated patient on plan of care for admission.     9:01 AM I discussed the patient's case and the above findings with Dr. Urrutia (Hospitalist) who agrees to evaluate the patient for hospitalization.     CRITICAL CARE TIME  Upon my evaluation, this patient had a high probability of imminent or life-threatening deterioration due to severe electrolyte abnormalities including hypokalemia and hypomagnesemia requiring IV repletion which required my direct attention, intervention, and personal management.     I personally provided 35 minutes of total critical care time outside of time spent on separately  billable/documented procedures. Time includes: review of laboratory data, review of radiology studies, discussion with consultants, discussion with family/patient, monitoring for potential decompensation.  Interventions were performed as documented above.         DISPOSITION:  Patient will be hospitalized by Dr. Urrutia in guarded condition.    IMPRESSION  (I50.9) Acute on chronic congestive heart failure, unspecified heart failure type (HCC)  (E87.6) Hypokalemia  (E83.51) Hypocalcemia    Results, diagnoses, and treatment options were discussed with the patient and/or family. Patient verbalized understanding of plan of care.    Richie SHANE (Scribe), am scribing for, and in the presence of, Laura Cordero M.D..    Electronically signed by: Richie Bojorquez (Mereibdavid), 4/10/2022    Laura SHANE M.D. personally performed the services described in this documentation, as scribed by Richie Bojorquez in my presence, and it is both accurate and complete.     The note accurately reflects work and decisions made by me.  Laura Cordero M.D.  4/10/2022  2:40 PM

## 2022-04-10 NOTE — ED NOTES
Med rec completed per pt   Allergies reviewed  No PO antibiotics in the last 30 days    Pt states that she has not taken any medications since 12/2021

## 2022-04-10 NOTE — ASSESSMENT & PLAN NOTE
Patient denied smoking meth so often  Urine drug screen + for meth, cannabis  Discussed drug cessation at length

## 2022-04-10 NOTE — ASSESSMENT & PLAN NOTE
Likely related to drugs and meth abuse   noncompliance with medication  echo showed ejection fraction 15%  Continue lasix, aldactone, lisinopril   Start metoprolol  Cardiology was consulted

## 2022-04-11 ENCOUNTER — APPOINTMENT (OUTPATIENT)
Dept: CARDIOLOGY | Facility: MEDICAL CENTER | Age: 57
DRG: 314 | End: 2022-04-11
Attending: HOSPITALIST
Payer: MEDICARE

## 2022-04-11 ENCOUNTER — APPOINTMENT (OUTPATIENT)
Dept: RADIOLOGY | Facility: MEDICAL CENTER | Age: 57
DRG: 314 | End: 2022-04-11
Attending: HOSPITALIST
Payer: MEDICARE

## 2022-04-11 PROBLEM — Z91.199 NONCOMPLIANCE: Status: ACTIVE | Noted: 2022-04-11

## 2022-04-11 PROBLEM — I50.20 SYSTOLIC HEART FAILURE (HCC): Status: ACTIVE | Noted: 2022-04-10

## 2022-04-11 PROBLEM — I50.23 ACUTE ON CHRONIC SYSTOLIC HEART FAILURE (HCC): Status: ACTIVE | Noted: 2022-04-10

## 2022-04-11 PROBLEM — K74.60 LIVER CIRRHOSIS (HCC): Status: ACTIVE | Noted: 2022-04-10

## 2022-04-11 LAB
ALBUMIN SERPL BCP-MCNC: 2.5 G/DL (ref 3.2–4.9)
ALBUMIN/GLOB SERPL: 0.5 G/DL
ALP SERPL-CCNC: 157 U/L (ref 30–99)
ALT SERPL-CCNC: 22 U/L (ref 2–50)
AMPHET UR QL SCN: POSITIVE
ANION GAP SERPL CALC-SCNC: 13 MMOL/L (ref 7–16)
AST SERPL-CCNC: 58 U/L (ref 12–45)
BARBITURATES UR QL SCN: NEGATIVE
BENZODIAZ UR QL SCN: NEGATIVE
BILIRUB SERPL-MCNC: 8.6 MG/DL (ref 0.1–1.5)
BUN SERPL-MCNC: 5 MG/DL (ref 8–22)
BZE UR QL SCN: NEGATIVE
CALCIUM SERPL-MCNC: 8.2 MG/DL (ref 8.5–10.5)
CANNABINOIDS UR QL SCN: POSITIVE
CHLORIDE SERPL-SCNC: 103 MMOL/L (ref 96–112)
CO2 SERPL-SCNC: 21 MMOL/L (ref 20–33)
CREAT SERPL-MCNC: 0.35 MG/DL (ref 0.5–1.4)
ERYTHROCYTE [DISTWIDTH] IN BLOOD BY AUTOMATED COUNT: 62.9 FL (ref 35.9–50)
EST. AVERAGE GLUCOSE BLD GHB EST-MCNC: 85 MG/DL
GFR SERPLBLD CREATININE-BSD FMLA CKD-EPI: 119 ML/MIN/1.73 M 2
GLOBULIN SER CALC-MCNC: 4.6 G/DL (ref 1.9–3.5)
GLUCOSE SERPL-MCNC: 113 MG/DL (ref 65–99)
HAV IGM SERPL QL IA: ABNORMAL
HBA1C MFR BLD: 4.6 % (ref 4–5.6)
HBV CORE IGM SER QL: ABNORMAL
HBV SURFACE AG SER QL: ABNORMAL
HCT VFR BLD AUTO: 41.5 % (ref 37–47)
HCV AB SER QL: REACTIVE
HGB BLD-MCNC: 13.6 G/DL (ref 12–16)
INR PPP: 7.84 (ref 0.87–1.13)
LV EJECT FRACT  99904: 15
LV EJECT FRACT MOD 2C 99903: 38.53
LV EJECT FRACT MOD 4C 99902: 25.32
LV EJECT FRACT MOD BP 99901: 34.79
MAGNESIUM SERPL-MCNC: 1.6 MG/DL (ref 1.5–2.5)
MCH RBC QN AUTO: 35.4 PG (ref 27–33)
MCHC RBC AUTO-ENTMCNC: 32.8 G/DL (ref 33.6–35)
MCV RBC AUTO: 108.1 FL (ref 81.4–97.8)
METHADONE UR QL SCN: NEGATIVE
OPIATES UR QL SCN: NEGATIVE
OXYCODONE UR QL SCN: NEGATIVE
PCP UR QL SCN: NEGATIVE
PLATELET # BLD AUTO: 103 K/UL (ref 164–446)
PMV BLD AUTO: 11.9 FL (ref 9–12.9)
POTASSIUM SERPL-SCNC: 4 MMOL/L (ref 3.6–5.5)
PROPOXYPH UR QL SCN: NEGATIVE
PROT SERPL-MCNC: 7.1 G/DL (ref 6–8.2)
PROTHROMBIN TIME: 63.7 SEC (ref 12–14.6)
RBC # BLD AUTO: 3.84 M/UL (ref 4.2–5.4)
SODIUM SERPL-SCNC: 137 MMOL/L (ref 135–145)
WBC # BLD AUTO: 5.2 K/UL (ref 4.8–10.8)

## 2022-04-11 PROCEDURE — 36415 COLL VENOUS BLD VENIPUNCTURE: CPT

## 2022-04-11 PROCEDURE — 93970 EXTREMITY STUDY: CPT | Mod: 26 | Performed by: INTERNAL MEDICINE

## 2022-04-11 PROCEDURE — 97602 WOUND(S) CARE NON-SELECTIVE: CPT

## 2022-04-11 PROCEDURE — 93970 EXTREMITY STUDY: CPT

## 2022-04-11 PROCEDURE — 87522 HEPATITIS C REVRS TRNSCRPJ: CPT

## 2022-04-11 PROCEDURE — 700102 HCHG RX REV CODE 250 W/ 637 OVERRIDE(OP): Performed by: HOSPITALIST

## 2022-04-11 PROCEDURE — 700117 HCHG RX CONTRAST REV CODE 255: Performed by: INTERNAL MEDICINE

## 2022-04-11 PROCEDURE — 93306 TTE W/DOPPLER COMPLETE: CPT | Mod: 26 | Performed by: INTERNAL MEDICINE

## 2022-04-11 PROCEDURE — 83735 ASSAY OF MAGNESIUM: CPT

## 2022-04-11 PROCEDURE — 93306 TTE W/DOPPLER COMPLETE: CPT

## 2022-04-11 PROCEDURE — 99233 SBSQ HOSP IP/OBS HIGH 50: CPT | Performed by: INTERNAL MEDICINE

## 2022-04-11 PROCEDURE — A9270 NON-COVERED ITEM OR SERVICE: HCPCS | Performed by: INTERNAL MEDICINE

## 2022-04-11 PROCEDURE — A9270 NON-COVERED ITEM OR SERVICE: HCPCS | Performed by: HOSPITALIST

## 2022-04-11 PROCEDURE — 85027 COMPLETE CBC AUTOMATED: CPT

## 2022-04-11 PROCEDURE — 700102 HCHG RX REV CODE 250 W/ 637 OVERRIDE(OP): Performed by: INTERNAL MEDICINE

## 2022-04-11 PROCEDURE — A9270 NON-COVERED ITEM OR SERVICE: HCPCS

## 2022-04-11 PROCEDURE — 80053 COMPREHEN METABOLIC PANEL: CPT

## 2022-04-11 PROCEDURE — 99222 1ST HOSP IP/OBS MODERATE 55: CPT | Performed by: INTERNAL MEDICINE

## 2022-04-11 PROCEDURE — 770020 HCHG ROOM/CARE - TELE (206)

## 2022-04-11 PROCEDURE — 80074 ACUTE HEPATITIS PANEL: CPT

## 2022-04-11 PROCEDURE — 700102 HCHG RX REV CODE 250 W/ 637 OVERRIDE(OP)

## 2022-04-11 PROCEDURE — 83036 HEMOGLOBIN GLYCOSYLATED A1C: CPT

## 2022-04-11 PROCEDURE — 85610 PROTHROMBIN TIME: CPT

## 2022-04-11 PROCEDURE — 76705 ECHO EXAM OF ABDOMEN: CPT

## 2022-04-11 PROCEDURE — 80307 DRUG TEST PRSMV CHEM ANLYZR: CPT

## 2022-04-11 PROCEDURE — 700111 HCHG RX REV CODE 636 W/ 250 OVERRIDE (IP): Performed by: INTERNAL MEDICINE

## 2022-04-11 RX ORDER — ATORVASTATIN CALCIUM 40 MG/1
40 TABLET, FILM COATED ORAL EVERY EVENING
Status: DISCONTINUED | OUTPATIENT
Start: 2022-04-11 | End: 2022-04-11

## 2022-04-11 RX ORDER — ATORVASTATIN CALCIUM 20 MG/1
20 TABLET, FILM COATED ORAL EVERY EVENING
Status: DISCONTINUED | OUTPATIENT
Start: 2022-04-11 | End: 2022-04-13 | Stop reason: HOSPADM

## 2022-04-11 RX ORDER — BUDESONIDE AND FORMOTEROL FUMARATE DIHYDRATE 80; 4.5 UG/1; UG/1
2 AEROSOL RESPIRATORY (INHALATION)
Status: DISCONTINUED | OUTPATIENT
Start: 2022-04-11 | End: 2022-04-13 | Stop reason: HOSPADM

## 2022-04-11 RX ORDER — DOXYCYCLINE 100 MG/1
100 TABLET ORAL EVERY 12 HOURS
Status: DISCONTINUED | OUTPATIENT
Start: 2022-04-11 | End: 2022-04-13 | Stop reason: HOSPADM

## 2022-04-11 RX ORDER — DOXYCYCLINE 100 MG/1
100 TABLET ORAL EVERY 12 HOURS
Status: DISCONTINUED | OUTPATIENT
Start: 2022-04-11 | End: 2022-04-11

## 2022-04-11 RX ORDER — MAGNESIUM SULFATE HEPTAHYDRATE 40 MG/ML
2 INJECTION, SOLUTION INTRAVENOUS ONCE
Status: COMPLETED | OUTPATIENT
Start: 2022-04-11 | End: 2022-04-11

## 2022-04-11 RX ORDER — AMOXICILLIN AND CLAVULANATE POTASSIUM 875; 125 MG/1; MG/1
1 TABLET, FILM COATED ORAL EVERY 12 HOURS
Status: DISCONTINUED | OUTPATIENT
Start: 2022-04-11 | End: 2022-04-13 | Stop reason: HOSPADM

## 2022-04-11 RX ORDER — PHYTONADIONE 5 MG/1
10 TABLET ORAL DAILY
Status: COMPLETED | OUTPATIENT
Start: 2022-04-11 | End: 2022-04-13

## 2022-04-11 RX ORDER — FUROSEMIDE 20 MG/1
20 TABLET ORAL
Status: DISCONTINUED | OUTPATIENT
Start: 2022-04-11 | End: 2022-04-13 | Stop reason: HOSPADM

## 2022-04-11 RX ORDER — ATORVASTATIN CALCIUM 20 MG/1
TABLET, FILM COATED ORAL
Status: COMPLETED
Start: 2022-04-11 | End: 2022-04-11

## 2022-04-11 RX ORDER — LISINOPRIL 5 MG/1
2.5 TABLET ORAL
Status: DISCONTINUED | OUTPATIENT
Start: 2022-04-11 | End: 2022-04-12

## 2022-04-11 RX ORDER — OXYCODONE HYDROCHLORIDE AND ACETAMINOPHEN 5; 325 MG/1; MG/1
1 TABLET ORAL EVERY 4 HOURS PRN
Status: COMPLETED | OUTPATIENT
Start: 2022-04-11 | End: 2022-04-12

## 2022-04-11 RX ADMIN — MAGNESIUM SULFATE HEPTAHYDRATE 2 G: 40 INJECTION, SOLUTION INTRAVENOUS at 08:43

## 2022-04-11 RX ADMIN — HUMAN ALBUMIN MICROSPHERES AND PERFLUTREN 3 ML: 10; .22 INJECTION, SOLUTION INTRAVENOUS at 12:34

## 2022-04-11 RX ADMIN — DOXYCYCLINE 100 MG: 100 TABLET, FILM COATED ORAL at 12:28

## 2022-04-11 RX ADMIN — ATORVASTATIN CALCIUM 20 MG: 20 TABLET, FILM COATED ORAL at 16:19

## 2022-04-11 RX ADMIN — RIVAROXABAN 10 MG: 10 TABLET, FILM COATED ORAL at 04:41

## 2022-04-11 RX ADMIN — SENNOSIDES AND DOCUSATE SODIUM 2 TABLET: 50; 8.6 TABLET ORAL at 16:20

## 2022-04-11 RX ADMIN — ACETAMINOPHEN 650 MG: 325 TABLET, FILM COATED ORAL at 16:22

## 2022-04-11 RX ADMIN — LISINOPRIL 2.5 MG: 5 TABLET ORAL at 14:59

## 2022-04-11 RX ADMIN — SPIRONOLACTONE 50 MG: 25 TABLET, FILM COATED ORAL at 04:41

## 2022-04-11 RX ADMIN — AMOXICILLIN AND CLAVULANATE POTASSIUM 1 TABLET: 875; 125 TABLET, FILM COATED ORAL at 12:28

## 2022-04-11 RX ADMIN — FUROSEMIDE 20 MG: 20 TABLET ORAL at 12:28

## 2022-04-11 RX ADMIN — PHYTONADIONE 10 MG: 5 TABLET ORAL at 17:34

## 2022-04-11 RX ADMIN — ASPIRIN 81 MG: 81 TABLET, COATED ORAL at 12:28

## 2022-04-11 ASSESSMENT — ENCOUNTER SYMPTOMS
EYES NEGATIVE: 1
NEUROLOGICAL NEGATIVE: 1
SHORTNESS OF BREATH: 1
HEMOPTYSIS: 0
MYALGIAS: 1
PSYCHIATRIC NEGATIVE: 1
SPUTUM PRODUCTION: 1
WHEEZING: 1
WEIGHT LOSS: 0
COUGH: 1
GASTROINTESTINAL NEGATIVE: 1
NECK PAIN: 0

## 2022-04-11 ASSESSMENT — PAIN DESCRIPTION - PAIN TYPE
TYPE: ACUTE PAIN
TYPE: CHRONIC PAIN;ACUTE PAIN

## 2022-04-11 ASSESSMENT — FIBROSIS 4 INDEX: FIB4 SCORE: 6.84

## 2022-04-11 NOTE — FLOWSHEET NOTE
Assumed care of pt, received bedside report from Ladarius DELGADO. Pt sitting up in bed, no complaints of pain, room air, A/Ox4. Fall and safety precautions in place, strip alarm in place. Discussed POC with pt, pt verbalizes understanding. No further needs at this time.

## 2022-04-11 NOTE — PROGRESS NOTES
[facility] Medicine Daily Progress Note    Date of Service  4/11/2022    Chief Complaint  Maral Herrera is a 56 y.o. female admitted 4/10/2022 with SOB and weakness    Hospital Course:    56-year-old female with history of drug/meth abuse, dilated cardiomyopathy, liver cirrhosis and smoking with noncompliance presented 4/10 with worsening shortness of breath and generalized weakness, denied any fever or chills, no significant chest pain however she has generalized weakness and lower extremity edema, patient noticed redness and swelling on the right leg, no urinary or bowel symptoms on admission labs did not show leukocytosis, creatinine was normal however magnesium was 0.9 troponin around normal 23 and EKG did not show any ischemic changes however showed left bundle branch block, echo is pending, patient received IV Lasix and spironolactone, potassium was replaced.    Patient has history of alcoholism, on admission jaundice was noticed, bilirubin was elevated 5.9 with mildly elevated liver enzymes, ultrasound showed signs of cirrhosis.    Patient has significant social issues and she lives with roommates versus motel?   was consulted, PT OT      Interval Problem Update  -Evaluated examined the patient at bedside, patient feels weaker, PT and OT was ordered, patient is alert oriented x4  -Worsening on her bilirubin  -Echo ejection fraction 15%, cardiology was consulted  -Start Lasix and continue spironolactone  -Start aspirin and atorvastatin  -Start inhalers for wheezing  -The CODE STATUS was discussed with the patient, she was not able to provide an answer, she asked me to call her brother, called Mr. Thomas however no response, voicemail was left.   -Palliative was consulted          I have personally seen and examined the patient at bedside. I discussed the plan of care with patient, bedside RN and charge RN.    Consultants/Specialty  cardiology      Code Status  Full Code    Disposition  Patient is not  medically cleared for discharge.   Anticipate discharge to to home with organized home healthcare and close outpatient follow-up.  I have placed the appropriate orders for post-discharge needs.    Review of Systems  Review of Systems   Constitutional: Positive for malaise/fatigue. Negative for weight loss.   HENT: Negative.    Eyes: Negative.    Respiratory: Positive for cough, sputum production, shortness of breath and wheezing. Negative for hemoptysis.    Cardiovascular: Positive for leg swelling.   Gastrointestinal: Negative.    Genitourinary: Negative.    Musculoskeletal: Positive for myalgias. Negative for neck pain.   Skin: Negative.    Neurological: Negative.    Psychiatric/Behavioral: Negative.         Physical Exam  Temp:  [36.2 °C (97.2 °F)-36.7 °C (98.1 °F)] 36.7 °C (98.1 °F)  Pulse:  [102-106] 103  Resp:  [17-18] 18  BP: (121-147)/() 130/90  SpO2:  [91 %-94 %] 94 %    Physical Exam  Constitutional:       General: She is in acute distress.      Appearance: She is ill-appearing.   HENT:      Head: Normocephalic and atraumatic.   Eyes:      General: Scleral icterus present.   Pulmonary:      Breath sounds: Wheezing and rales present.   Abdominal:      General: Bowel sounds are normal. There is no distension.      Palpations: Abdomen is soft.      Tenderness: There is no abdominal tenderness. There is no guarding.   Musculoskeletal:      Right lower leg: Edema present.      Left lower leg: Edema present.   Skin:     Coloration: Skin is jaundiced and pale.      Findings: Bruising and erythema present. No lesion or rash.      Comments: Redness and drainage on the left leg   Neurological:      General: No focal deficit present.      Mental Status: She is alert and oriented to person, place, and time. Mental status is at baseline.      Cranial Nerves: No cranial nerve deficit.      Motor: No weakness.   Psychiatric:         Mood and Affect: Mood normal.         Fluids    Intake/Output Summary (Last 24  hours) at 4/11/2022 1513  Last data filed at 4/11/2022 1500  Gross per 24 hour   Intake 550 ml   Output 500 ml   Net 50 ml       Laboratory  Recent Labs     04/10/22  0700 04/11/22  0234   WBC 5.0 5.2   RBC 3.44* 3.84*   HEMOGLOBIN 12.3 13.6   HEMATOCRIT 37.5 41.5   .0* 108.1*   MCH 35.8* 35.4*   MCHC 32.8* 32.8*   RDW 64.8* 62.9*   PLATELETCT 81* 103*   MPV 11.7 11.9     Recent Labs     04/10/22  0751 04/10/22  1549 04/11/22  0234   SODIUM 141  --  137   POTASSIUM 2.5* 3.9 4.0   CHLORIDE 111  --  103   CO2 20  --  21   GLUCOSE 94  --  113*   BUN 5*  --  5*   CREATININE 0.22*  --  0.35*   CALCIUM 6.7*  --  8.2*                   Imaging  EC-ECHOCARDIOGRAM COMPLETE W/ CONT   Final Result      US-RUQ   Final Result      1.  The liver is enlarged and heterogenously echogenic.  Differential diagnosis includes most likely hepatic steatosis versus other diffuse hepatocellular disease.   2.  There is cholelithiasis.         CT-CTA CHEST PULMONARY ARTERY W/ RECONS   Final Result      1.  Negative for pulmonary embolism      2.  Mild atelectasis and very small bilateral pleural effusion      3.  Significant left ventricular dilation      4.  Cirrhosis and hepatic steatosis            DX-CHEST-PORTABLE (1 VIEW)   Final Result      1.  Cardiac silhouette enlargement.   2.  No acute abnormality.      US-EXTREMITY VENOUS LOWER BILAT    (Results Pending)        Assessment/Plan  * Systolic heart failure (HCC)  Assessment & Plan  With exacerbation and mild pulmonary edema   dilated cardiomyopathy,   History noncompliance, off medications since December, methamphetamine use, medical noncompliance, low albumin.  Multiple etiology possible.  Diuretics Lasix orally at this time, patient is on room air  Start with small dose of lisinopril  EKG showed left bundle branch block  Echo EF 15%  cardiology consult for possible ICD versus CRT    Dilated cardiomyopathy (HCC)  Assessment & Plan  Likely related to drugs and meth abuse    noncompliance with medication  echo showed ejection fraction 15%  Started with Lasix with Aldactone, ACE inhibitors and consider beta-blocker later started with  Monitor vitals, labs, I/O's  Cardiology was consulted, appreciate recommendation    Noncompliance  Assessment & Plan  Patient has multiorgan failures with drug abuse and noncompliance with medications  Patient lives with her roommate versus motel  PT and OT evaluation      Severe protein-calorie malnutrition (HCC)  Assessment & Plan  Drug abuse and liver and heart failure   albumin 1.8  Nutrition consulted   encouraged the patient to quit drinking and drugs  Monitor.    History of CVA (cerebrovascular accident)  Assessment & Plan  According to the patient patient had brain surgery in 2012 for a brain tumor  States she can only walk short distances.  PT and OT    Leg edema  Assessment & Plan  Likely related to liver cirrhosis and heart failure   Spironolactone and Lasix with close monitoring   Echo ordered  Wound care and antibiotics for cellulitis    Macrocytosis  Assessment & Plan  Fatty liver vs other.  Recommend outpatient follow up for B12 and folate evaluation.  Start MVI and B12 in house for supplement.    Methamphetamine abuse (HCC)  Assessment & Plan  Patient denied smoking meth so often  Urine drug screens pending    Hypocalcemia  Assessment & Plan  Corrected calcium 8.5  monitor    Open wound of fifth toe of right foot  Assessment & Plan  Redness and drainage coming from her r shine   wound care.  Doxycycline for infection and cellulitis    Thrombocytopenia (HCC)  Assessment & Plan  Due to liver failure   no bleeding  Close monitoring     Obesity (BMI 30.0-34.9)  Assessment & Plan  Body mass index is 32.28 kg/m².    Liver cirrhosis (HCC)  Assessment & Plan  Patient states she has history of alcoholism years ago    labs showed mildly elevated liver enzymes and total bilirubin 8  Check INR  Ultrasound showed signs of cirrhosis  Needs  to follow-up with GI as outpatient    Hypokalemia- (present on admission)  Assessment & Plan  Resolved  Labs daily       VTE prophylaxis: SCDs/TEDs and heparin ppx    I have performed a physical exam and reviewed and updated ROS and Plan today (4/11/2022). In review of yesterday's note (4/10/2022), there are no changes except as documented above.

## 2022-04-11 NOTE — HEART FAILURE PROGRAM
"Decompensation of heart failure with reduced ejection fraction (HFrEF). Per h/p, patient has been without her medications since December and she does not f/u with anyone outside of the emergency room and gets her meds from there.    Patient is actively using methamphetamine per h/p. No discharge plan notes at this time, placed SW order to find out if patient has any barriers to going to appointments to get prescriptions that can be remedied.     EF: 15%  QRSD: 165 ms  EF <35% for greater than 3 months?: likely - per h/p she has had dilated cardiomyopathy. This is her first encounter within our healthcare system but she has been going to the ER at [facility] since 2018.    Per h/p it looks like patient is mostly bed bound and uses a wheelchair.    Patient is a full code with no ACP documents on file. This is concerning given that the social situation, her current state of multi-organ failure, and her very low EF makes her very likely to be brought in at some point unable to make decisions for herself. I did reach out to Dr. Montiel about this.    Discharge Planning  • Residence: local  • Insurance: Medicare  • Indication on facesheet for Hydralazine Hydrochloride/Isosorbide- Dinitrate? no  • Referral to disease management program specializing in heart failure care- requested that schedulers notify me if patient cannot be scheduled at the [facility]  • Opted in for Meds to Beds? yes  • LACE Risk Score: 51  • General Risk Score: 9  • Other Risks per Chart Review: discussed above    Special Clinical Considerations Pertinent to HF    • Pneumococcal vaccine: missing  • Influenza vaccine: n/a  • Tobacco Status: active  • Documentation of tobacco cessation counseling: documented by Dr. Urrutia in his h/p under the diagnosis \"shortness of breath\"  • DM dx: no  • Atrial arrhythmia dx: no    Nurses: HF has been added as a title to the education tab and to the care plan. Would you please take credit for the great work " that you're doing by documenting in these? Thank you!    Providers: below are Guideline Directed Medical Therapy (GDMT) for HFrEF. If any cannot be prescribed by discharge, would you please note the clinical reason for each in your discharge summary? Thanks! Krystal    • Evidence Based Beta Blocker (bisoprolol, carvedilol, or toprol xl), for EF of 40% or less  • BRENDA - I, for EF of 40% or less ARNI is preferred If not cost prohibitive for patient  • SGLT2 inhibitor with proven ASCVD, HF, or DKD benefit Jardiance/empagliflozin): If not cost prohibitive for patient  • Aldosterone antagonist, for EF of 35% or less, if applicable  • Anticoagulation for atrial arrhythmia, if applicable  • Glycemic control for DM + HF, if applicable  • Lipid lowering medication for DM + HF, if applicable  • Hydralazine Hydrochloride/Isosorbide Dinitrate. The combination of hydralazine and isosorbide- dinitrate is recommended to reduce morbidity and mortality for patients self-described  Americans with NYHA class III-IV HFrEF (EF 40% or less), receiving optimal therapy with ACE inhibitors and beta blockers, unless contraindicated (Class I, HUMAIRA: A).  • Pneumococcal vaccine, if not previously received  • Influenza vaccine for current season, if not previously received  • Smoking cessation counseling documented, if applicable  • Device screening, if applicable  • Referral to disease management program specializing in heart failure care- requested that schedulers notify me if patient cannot be scheduled at the [facility] Failure [facility]  • 7 calendar day f/u appointment scheduled prior to discharge, appearing on signed after visit summary.

## 2022-04-11 NOTE — DISCHARGE PLANNING
Anticipated Discharge Disposition: SNF    Action: LSW spoke with pt about SNF. Pt reported she has been to SNF before. Pt gave verbal consent to send blanket SNF referrals, and would ideally like a facility close to [facility] so her roommates are able to visit her. Choice form faxed to [facility].    Barriers to Discharge: PT/OT eval, SNF acceptance.    Plan: Care coordination will follow up with SNF referrals.    Care Transition Team Assessment    LSW met with pt at bedside to complete assessment. Pt A&Ox4 and able to verify the information on the face sheet. Pt reported her current address is a third floor apartment to which the elevators are broken down. Pt stated she lives there with three roommates, however, as of tomorrow they will be moved out. Pt stated her roommates are working on finding new housing and her plan is to continue living with the same roommates at whatever new housing they are able to secure.    Pt uses a wheelchair at baseline and reported she has been having more difficulties with ADLs recently. Pt's roommates have been assisting her with transfers, dressing, bathing, and cooking prior to this hospitalization. Pt reported she hardly goes places due to the elevators being broken, however when able to go down stairs she uses the bus for transportation. Pt stated she has an electric wheelchair and shower chair at home.    Pt denies any SA, however per chart review pt has a history of meth use. Pt also denies any MH concerns. Pt does not have an advance directive and declined AD packet at this time.    Information Source   Orientation Level: Oriented X4  Information Given By: Patient  Informant's Name: Maral Herrera  Who is responsible for making decisions for patient? : Patient    Readmission Evaluation  Is this a readmission?: No    Elopement Risk  Legal Hold: No  Ambulatory or Self Mobile in Wheelchair: Yes  Disoriented: No  Psychiatric Symptoms: None  History of Wandering: No  Elopement this  Admit: No  Vocalizing Wanting to Leave: No  Displays Behaviors, Body Language Wanting to Leave: No-Not at Risk for Elopement  Elopement Risk: Not at Risk for Elopement    Interdisciplinary Discharge Planning  Lives with - Patient's Self Care Capacity: Friends  Patient or legal guardian wants to designate a caregiver: No  Support Systems: Friends / Neighbors  Housing / Facility: [facility]  Patient Prefers to be Discharged to:: Pt got evicted from [facility] today, so has no home.  Durable Medical Equipment: Other - Specify (wheelchair, shower chair)    Discharge Preparedness  What is your plan after discharge?: Skilled nursing facility  What are your discharge supports?: Other (comment) (roommates)  Prior Functional Level: Needs Assist with Activities of Daily Living,Independent with Medication Management,Wheelchair Dependent  Difficulity with ADLs: Walking,Toileting,Dressing  Difficulity with IADLs: Cooking,Driving,Managing medication    Functional Assesment  Prior Functional Level: Needs Assist with Activities of Daily Living,Independent with Medication Management,Wheelchair Dependent    Finances  Financial Barriers to Discharge: No  Prescription Coverage: Yes    Vision / Hearing Impairment  Vision Impairment : No  Hearing Impairment : No    Advance Directive  Advance Directive?: None  Advance Directive offered?: AD Booklet refused    Domestic Abuse  Have you ever been the victim of abuse or violence?: No  Physical Abuse or Sexual Abuse: No  Verbal Abuse or Emotional Abuse: No  Possible Abuse/Neglect Reported to:: Not Applicable    Psychological Assessment  History of Substance Abuse: Methamphetamine  History of Psychiatric Problems: No  Non-compliant with Treatment: No  Newly Diagnosed Illness: No    Discharge Risks or Barriers  Discharge risks or barriers?: No PCP,Substance abuse  Patient risk factors: Noncompliance,No PCP,Substance abuse    Anticipated Discharge Information  Discharge Disposition: D/T to SNF with  Medicare cert in anticipation of skilled care (03)

## 2022-04-11 NOTE — PROGRESS NOTES
Report received. Pt laying in bed awake alert and oriented x 4. Pt Repositioned for comfort. Discussed POC. Bed locked in low position with fall precautions in place.

## 2022-04-11 NOTE — DIETARY
"Nutrition services: Day 1 of admit.  Maral Herrera is a 56 y.o. female with admitting DX of Hypokalemia     Consult received for high risk dx of severe protein-calorie malnutrition     Met with pt at bedside however pt was wishing to sleep and did not want to talk. Upon observation pt appeared nourished. Will order Boost per poor PO protocol to help bolster nutrition.     Assessment:   Height: 167.6 cm (5' 6\")  Weight: 91.2 kg (201 lb 1 oz) via bed scale   Body mass index is 32.45 kg/m²., BMI classification: Class I obesity   Diet/Intake: Cardiac; PO intake 25-50% x 1 meal, <25% x 1 meal     Evaluation:   1. Pt presents with methamphetamine use, leg edema, dilated cardiomyopathy, and systolic heart failure  2. Current clinical picture and MD notes reviewed. Per MD, severe protein calorie malnutrition assessment includes low albumin and hx of drug/ETOH abuse.    3. Skin: wound on L leg and R 5th toe, wound not following.  3+ pitting edema present on RLE/LLE  4. Labs: Bun 5, Cr 0.35  5. Meds: Aldactone, Lasix (not yet given), lytes replaced   6. Last BM: PTA     Malnutrition Risk: Unable to assess at this time, will follow up as able.     Recommendations/Plan:  1. Provide Boost Plus QD   2. Encourage intake of >50-75% of meals and supplement   3. Document intake of all meals and supplement as % taken in ADL's to provide interdisciplinary communication across all shifts.   4. Monitor weight.  5. Nutrition rep will continue to see patient for ongoing meal and snack preferences.     RD following.       "

## 2022-04-11 NOTE — HOSPITAL COURSE
56-year-old female with history of drug/meth abuse, dilated cardiomyopathy, liver cirrhosis and smoking with noncompliance presented 4/10 with worsening shortness of breath and generalized weakness, denied any fever or chills, no significant chest pain however she has generalized weakness and lower extremity edema, patient noticed redness and swelling on the right leg, no urinary or bowel symptoms on admission labs did not show leukocytosis, creatinine was normal however magnesium was 0.9 troponin around normal 23 and EKG did not show any ischemic changes however showed left bundle branch block, echo is pending, patient received IV Lasix and spironolactone, potassium was replaced.    Patient has history of alcoholism, on admission jaundice was noticed, bilirubin was elevated 5.9 with mildly elevated liver enzymes, ultrasound showed signs of cirrhosis.    Patient has significant social issues and she lives with roommates versus motel?   was consulted, PT OT

## 2022-04-11 NOTE — CARE PLAN
Problem: Nutritional:  Goal: Achieve adequate nutritional intake  Description: Patient will consume >50-75% of meals  Outcome: Not Met     RD following.

## 2022-04-11 NOTE — DISCHARGE PLANNING
Received Choice form at [id number]  Agency/Facility Name: Local [facility] [facility] [place] SNFs  Referral sent per Choice form @ [phone]

## 2022-04-11 NOTE — WOUND TEAM
[facility]  Inpatient Services  Initial Wound and Skin Care Evaluation    Admission Date: 4/10/2022     Last order of IP CONSULT TO WOUND CARE was found on 4/10/2022 from Hospital Encounter on 4/10/2022     HPI, PMH, SH: Reviewed    Past Surgical History:   Procedure Laterality Date   • CRANIOTOMY TUMOR  2012     Social History     Tobacco Use   • Smoking status: Current Some Day Smoker   • Smokeless tobacco: Never Used   Substance Use Topics   • Alcohol use: Yes     Comment: 4 wine coolers a week     Chief Complaint   Patient presents with   • Shortness of Breath     BIBA for SOB and FVO for the past 4 days. States she hasnt' been taking her cardiac meds for the past few months. Given 4 nitro tablets en route     Diagnosis: Hypokalemia [E87.6]    Unit where seen by Wound Team: [id number]/02     WOUND CONSULT/FOLLOW UP RELATED TO:  LLE & R. 5th toe     WOUND HISTORY:  Patient has chronic venous stasis and a CVA with right sided deficit.  She is wheel chair bound mostly and came in because she was SOB, and her legs became swollen      Maral Herrera is a 56 y.o. female who presents to the Emergency Department via EMS for shortness of breath. Patient has a history of heart failure, and reports worsening dyspnea and leg swelling in the last 4 days. She states she has not been compliant with her medications for the last 4 months. She has not been taking her Lasix as she has difficulty getting up frequently to urinate as she uses a wheelchair and has a difficult living situation. Patient has an associated cough, but denies any vomiting, diarrhea, or fevers. She admits to using methamphetamine.     WOUND ASSESSMENT/LDA  Wound 04/10/22 Partial Thickness Wound Leg Left R 5th Toe (Active)   Wound Image    04/11/22 1200   Site Assessment Red;Heartland 04/11/22 1200   Periwound Assessment Hemosiderin Staining;Pink 04/11/22 1200   Margins Attached edges;Defined edges 04/11/22 1200   Closure Open to air 04/11/22 1200    Drainage Amount None 04/11/22 1200   Drainage Description Serosanguineous 04/11/22 0800   Treatments Cleansed;Site care;Offloading 04/11/22 1200   Wound Cleansing Soap and Water 04/11/22 1200   Periwound Protectant Skin Moisturizer 04/11/22 1200   Dressing Cleansing/Solutions Not Applicable 04/11/22 1200   Dressing Options Open to Air 04/11/22 1200   Dressing Changed New 04/11/22 1200   Dressing Status Open to Air 04/11/22 1200   Dressing Change/Treatment Frequency Daily, and As Needed 04/11/22 1200   NEXT Dressing Change/Treatment Date 04/12/22 04/11/22 1200   NEXT Weekly Photo (Inpatient Only) 04/18/22 04/11/22 1200   Exposed Structures None 04/11/22 1200   WOUND NURSE ONLY - Time Spent with Patient (mins) 45 04/11/22 1200   Number of days: 1        Vascular:    AL:   No results found.    Lab Values:    Lab Results   Component Value Date/Time    WBC 5.2 04/11/2022 02:34 AM    RBC 3.84 (L) 04/11/2022 02:34 AM    HEMOGLOBIN 13.6 04/11/2022 02:34 AM    HEMATOCRIT 41.5 04/11/2022 02:34 AM    HBA1C 4.8 04/02/2019 03:49 PM        Culture Results show:  No results found for this or any previous visit (from the past 720 hour(s)).    Pain Level/Medicated:  Denied pain       INTERVENTIONS BY WOUND TEAM:  Chart and images reviewed. Discussed with bedside RN. All areas of concern (based on picture review, LDA review and discussion with bedside RN) have been thoroughly assessed. Documentation of areas based on significant findings. This RN in to assess patient. Performed standard wound care which includes appropriate positioning, dressing removal and non-selective debridement. Pictures and measurements obtained weekly if/when required.  BLE  Preparation for Dressing removal: Dressing soaked with n/a  Non-selectively Debrided with:  soap and water and gauze.  Sharp debridement: n/a  Jenifer wound: Cleansed with soap and water, Prepped with skin moisturizer  Primary Dressing: Tubi  E  Secondary (Outer) Dressing:  n/a    Interdisciplinary consultation: Patient, Bedside RN ,     EVALUATION / RATIONALE FOR TREATMENT:  Most Recent Date:  4/11/22: Patient most likely has venous stasis, thready pulses bilateral, swelling is decreasing because of medicatons per patient.  She also hit her 5th toe going into the closet, but wound is nice a closed.  Cleansed legs really well, applied pink top lotion and tubi  E's to add light compression to assist with reducing swelling.       Goals: Steady decrease in wound area and depth weekly.    WOUND TEAM PLAN OF CARE ([X] for frequency of wound follow up,): X wound team is not following  Nursing to follow orders written for wound care. Contact wound team if area fails to progress, deteriorates or with any questions/concerns  Dressing changes by wound team:                   Follow up 3 times weekly:                NPWT change 3 times weekly:     Follow up 1-2 times weekly:      Follow up Bi-Monthly:                   Follow up as needed:   X  Other (explain):     NURSING PLAN OF CARE ORDERS (X):  Dressing changes: See Dressing Care orders: X  Skin care: See Skin Care orders: X  RN Prevention Protocol: X  Rectal tube care: See Rectal Tube Care orders:   Other orders:    RSKIN:   CURRENTLY IN PLACE (X), APPLIED THIS VISIT (A), ORDERED (O):   Q shift Onofre:  X  Q shift pressure point assessments:  X    Surface/Positioning X  Pressure redistribution mattress            Low Airloss       X This RN turned the bed on   Bariatric foam      Bariatric NIRAV     Waffle cushion        Waffle Overlay          Reposition q 2 hours      TAPs Turning system     Z Satnam Pillow     Offloading/Redistribution X  Sacral Mepilex (Silicone dressing)     Heel Mepilex (Silicone dressing)      X   Heel float boots (Prevalon boot)         X    Float Heels off Bed with Pillows           Respiratory N/c  Silicone O2 tubing         Gray Foam Ear protectors     Cannula fixation Device (Tender )          High flow  offloading Clip    Elastic head band offloading device      Anchorfast                                                         Trach with Optifoam split foam             Containment/Moisture Prevention X    Rectal tube or BMS    Purwick/Condom Cath     X   Tamayo Catheter    Barrier wipes           Barrier paste       Antifungal tx      Interdry        Mobilization MORALES      Up to chair        Ambulate      PT/OT      Nutrition PO      Dietician        Diabetes Education      PO     TF     TPN     NPO   # days     Other        Anticipated discharge plans: MORALES  LTACH:        SNF/Rehab:                  Home Health Care:           Outpatient Wound Center:            Self/Family Care:        Other:                  Vac Discharge Needs:   Not Applicable Pt not on a wound vac:  X     Regular Vac while inpatient, alternative dressing at DC:        Regular Vac in use and continued at DC:            Reg. Vac w/ Skin Sub/Biologic in use. Will need to be changed 2x wkly:      Veraflo Vac while inpatient, ok to transition to Regular Vac on Discharge:           Veraflo Vac while inpatient, will need to remain on Veraflo Vac upon discharge:

## 2022-04-11 NOTE — ASSESSMENT & PLAN NOTE
Patient has multiorgan failures with drug abuse and noncompliance with medications  Patient lives with her roommate versus motel  PT and OT evaluation

## 2022-04-12 ENCOUNTER — APPOINTMENT (OUTPATIENT)
Dept: RADIOLOGY | Facility: MEDICAL CENTER | Age: 57
DRG: 314 | End: 2022-04-12
Attending: INTERNAL MEDICINE
Payer: MEDICARE

## 2022-04-12 LAB
ALBUMIN SERPL BCP-MCNC: 2.2 G/DL (ref 3.2–4.9)
ALBUMIN/GLOB SERPL: 0.6 G/DL
ALP SERPL-CCNC: 129 U/L (ref 30–99)
ALT SERPL-CCNC: 17 U/L (ref 2–50)
ANION GAP SERPL CALC-SCNC: 9 MMOL/L (ref 7–16)
AST SERPL-CCNC: 44 U/L (ref 12–45)
BASOPHILS # BLD AUTO: 1.6 % (ref 0–1.8)
BASOPHILS # BLD: 0.08 K/UL (ref 0–0.12)
BILIRUB SERPL-MCNC: 7.3 MG/DL (ref 0.1–1.5)
BUN SERPL-MCNC: 7 MG/DL (ref 8–22)
CALCIUM SERPL-MCNC: 8.1 MG/DL (ref 8.5–10.5)
CHLORIDE SERPL-SCNC: 104 MMOL/L (ref 96–112)
CHOLEST SERPL-MCNC: 83 MG/DL (ref 100–199)
CO2 SERPL-SCNC: 23 MMOL/L (ref 20–33)
CREAT SERPL-MCNC: 0.52 MG/DL (ref 0.5–1.4)
EOSINOPHIL # BLD AUTO: 0.18 K/UL (ref 0–0.51)
EOSINOPHIL NFR BLD: 3.7 % (ref 0–6.9)
ERYTHROCYTE [DISTWIDTH] IN BLOOD BY AUTOMATED COUNT: 61.6 FL (ref 35.9–50)
GFR SERPLBLD CREATININE-BSD FMLA CKD-EPI: 108 ML/MIN/1.73 M 2
GLOBULIN SER CALC-MCNC: 4 G/DL (ref 1.9–3.5)
GLUCOSE SERPL-MCNC: 114 MG/DL (ref 65–99)
HCT VFR BLD AUTO: 36.3 % (ref 37–47)
HDLC SERPL-MCNC: 32 MG/DL
HGB BLD-MCNC: 12.1 G/DL (ref 12–16)
IMM GRANULOCYTES # BLD AUTO: 0.01 K/UL (ref 0–0.11)
IMM GRANULOCYTES NFR BLD AUTO: 0.2 % (ref 0–0.9)
INR PPP: 5.79 (ref 0.87–1.13)
LDLC SERPL CALC-MCNC: 36 MG/DL
LYMPHOCYTES # BLD AUTO: 1.67 K/UL (ref 1–4.8)
LYMPHOCYTES NFR BLD: 34.4 % (ref 22–41)
MAGNESIUM SERPL-MCNC: 1.6 MG/DL (ref 1.5–2.5)
MCH RBC QN AUTO: 35.6 PG (ref 27–33)
MCHC RBC AUTO-ENTMCNC: 33.3 G/DL (ref 33.6–35)
MCV RBC AUTO: 106.8 FL (ref 81.4–97.8)
MONOCYTES # BLD AUTO: 0.7 K/UL (ref 0–0.85)
MONOCYTES NFR BLD AUTO: 14.4 % (ref 0–13.4)
NEUTROPHILS # BLD AUTO: 2.21 K/UL (ref 2–7.15)
NEUTROPHILS NFR BLD: 45.7 % (ref 44–72)
NRBC # BLD AUTO: 0.02 K/UL
NRBC BLD-RTO: 0.4 /100 WBC
PLATELET # BLD AUTO: 102 K/UL (ref 164–446)
PMV BLD AUTO: 11.6 FL (ref 9–12.9)
POTASSIUM SERPL-SCNC: 4.1 MMOL/L (ref 3.6–5.5)
PROT SERPL-MCNC: 6.2 G/DL (ref 6–8.2)
PROTHROMBIN TIME: 50.3 SEC (ref 12–14.6)
RBC # BLD AUTO: 3.4 M/UL (ref 4.2–5.4)
SODIUM SERPL-SCNC: 136 MMOL/L (ref 135–145)
TRIGL SERPL-MCNC: 77 MG/DL (ref 0–149)
WBC # BLD AUTO: 4.9 K/UL (ref 4.8–10.8)

## 2022-04-12 PROCEDURE — 83735 ASSAY OF MAGNESIUM: CPT

## 2022-04-12 PROCEDURE — 700102 HCHG RX REV CODE 250 W/ 637 OVERRIDE(OP): Performed by: STUDENT IN AN ORGANIZED HEALTH CARE EDUCATION/TRAINING PROGRAM

## 2022-04-12 PROCEDURE — 85610 PROTHROMBIN TIME: CPT

## 2022-04-12 PROCEDURE — 700102 HCHG RX REV CODE 250 W/ 637 OVERRIDE(OP): Performed by: INTERNAL MEDICINE

## 2022-04-12 PROCEDURE — 97162 PT EVAL MOD COMPLEX 30 MIN: CPT

## 2022-04-12 PROCEDURE — A9270 NON-COVERED ITEM OR SERVICE: HCPCS | Performed by: HOSPITALIST

## 2022-04-12 PROCEDURE — 80053 COMPREHEN METABOLIC PANEL: CPT

## 2022-04-12 PROCEDURE — 700111 HCHG RX REV CODE 636 W/ 250 OVERRIDE (IP): Performed by: HOSPITALIST

## 2022-04-12 PROCEDURE — 97166 OT EVAL MOD COMPLEX 45 MIN: CPT

## 2022-04-12 PROCEDURE — 97535 SELF CARE MNGMENT TRAINING: CPT

## 2022-04-12 PROCEDURE — 94640 AIRWAY INHALATION TREATMENT: CPT

## 2022-04-12 PROCEDURE — A9270 NON-COVERED ITEM OR SERVICE: HCPCS | Performed by: STUDENT IN AN ORGANIZED HEALTH CARE EDUCATION/TRAINING PROGRAM

## 2022-04-12 PROCEDURE — A9270 NON-COVERED ITEM OR SERVICE: HCPCS | Performed by: INTERNAL MEDICINE

## 2022-04-12 PROCEDURE — 700102 HCHG RX REV CODE 250 W/ 637 OVERRIDE(OP): Performed by: HOSPITALIST

## 2022-04-12 PROCEDURE — 36415 COLL VENOUS BLD VENIPUNCTURE: CPT

## 2022-04-12 PROCEDURE — 85025 COMPLETE CBC W/AUTO DIFF WBC: CPT

## 2022-04-12 PROCEDURE — 99232 SBSQ HOSP IP/OBS MODERATE 35: CPT | Performed by: INTERNAL MEDICINE

## 2022-04-12 PROCEDURE — 71045 X-RAY EXAM CHEST 1 VIEW: CPT

## 2022-04-12 PROCEDURE — 770001 HCHG ROOM/CARE - MED/SURG/GYN PRIV*

## 2022-04-12 PROCEDURE — 80061 LIPID PANEL: CPT

## 2022-04-12 RX ORDER — METOPROLOL SUCCINATE 25 MG/1
25 TABLET, EXTENDED RELEASE ORAL
Status: DISCONTINUED | OUTPATIENT
Start: 2022-04-12 | End: 2022-04-13 | Stop reason: HOSPADM

## 2022-04-12 RX ORDER — LISINOPRIL 5 MG/1
5 TABLET ORAL
Status: DISCONTINUED | OUTPATIENT
Start: 2022-04-13 | End: 2022-04-13 | Stop reason: HOSPADM

## 2022-04-12 RX ORDER — SPIRONOLACTONE 25 MG/1
25 TABLET ORAL
Status: DISCONTINUED | OUTPATIENT
Start: 2022-04-13 | End: 2022-04-13 | Stop reason: HOSPADM

## 2022-04-12 RX ADMIN — ACETAMINOPHEN 650 MG: 325 TABLET, FILM COATED ORAL at 09:20

## 2022-04-12 RX ADMIN — SPIRONOLACTONE 50 MG: 25 TABLET, FILM COATED ORAL at 05:42

## 2022-04-12 RX ADMIN — ACETAMINOPHEN 650 MG: 325 TABLET, FILM COATED ORAL at 16:56

## 2022-04-12 RX ADMIN — TIOTROPIUM BROMIDE INHALATION SPRAY 5 MCG: 3.12 SPRAY, METERED RESPIRATORY (INHALATION) at 09:22

## 2022-04-12 RX ADMIN — AMOXICILLIN AND CLAVULANATE POTASSIUM 1 TABLET: 875; 125 TABLET, FILM COATED ORAL at 16:53

## 2022-04-12 RX ADMIN — PHYTONADIONE 10 MG: 5 TABLET ORAL at 05:45

## 2022-04-12 RX ADMIN — DOXYCYCLINE 100 MG: 100 TABLET, FILM COATED ORAL at 16:53

## 2022-04-12 RX ADMIN — OXYCODONE HYDROCHLORIDE AND ACETAMINOPHEN 1 TABLET: 5; 325 TABLET ORAL at 05:45

## 2022-04-12 RX ADMIN — LISINOPRIL 2.5 MG: 5 TABLET ORAL at 05:39

## 2022-04-12 RX ADMIN — BUDESONIDE AND FORMOTEROL FUMARATE DIHYDRATE 2 PUFF: 80; 4.5 AEROSOL RESPIRATORY (INHALATION) at 09:22

## 2022-04-12 RX ADMIN — ATORVASTATIN CALCIUM 20 MG: 20 TABLET, FILM COATED ORAL at 16:53

## 2022-04-12 RX ADMIN — AMOXICILLIN AND CLAVULANATE POTASSIUM 1 TABLET: 875; 125 TABLET, FILM COATED ORAL at 05:39

## 2022-04-12 RX ADMIN — ONDANSETRON 4 MG: 4 TABLET, ORALLY DISINTEGRATING ORAL at 13:26

## 2022-04-12 RX ADMIN — ONDANSETRON 4 MG: 4 TABLET, ORALLY DISINTEGRATING ORAL at 19:46

## 2022-04-12 RX ADMIN — METOPROLOL SUCCINATE 25 MG: 25 TABLET, EXTENDED RELEASE ORAL at 13:29

## 2022-04-12 RX ADMIN — ONDANSETRON 4 MG: 4 TABLET, ORALLY DISINTEGRATING ORAL at 09:20

## 2022-04-12 RX ADMIN — BUDESONIDE AND FORMOTEROL FUMARATE DIHYDRATE 2 PUFF: 80; 4.5 AEROSOL RESPIRATORY (INHALATION) at 19:46

## 2022-04-12 RX ADMIN — FUROSEMIDE 20 MG: 20 TABLET ORAL at 05:41

## 2022-04-12 RX ADMIN — RIVAROXABAN 10 MG: 10 TABLET, FILM COATED ORAL at 05:40

## 2022-04-12 RX ADMIN — ASPIRIN 81 MG: 81 TABLET, COATED ORAL at 05:40

## 2022-04-12 RX ADMIN — DOXYCYCLINE 100 MG: 100 TABLET, FILM COATED ORAL at 05:39

## 2022-04-12 ASSESSMENT — ENCOUNTER SYMPTOMS
DIZZINESS: 0
HEMOPTYSIS: 0
SPUTUM PRODUCTION: 1
NAUSEA: 0
NECK PAIN: 0
ABDOMINAL PAIN: 0
MYALGIAS: 1
COUGH: 1
SHORTNESS OF BREATH: 1
WEIGHT LOSS: 0
HEADACHES: 0

## 2022-04-12 ASSESSMENT — COGNITIVE AND FUNCTIONAL STATUS - GENERAL
MOVING TO AND FROM BED TO CHAIR: UNABLE
WALKING IN HOSPITAL ROOM: A LOT
PERSONAL GROOMING: A LITTLE
HELP NEEDED FOR BATHING: A LOT
CLIMB 3 TO 5 STEPS WITH RAILING: TOTAL
DRESSING REGULAR LOWER BODY CLOTHING: A LOT
DRESSING REGULAR UPPER BODY CLOTHING: A LITTLE
MOBILITY SCORE: 9
STANDING UP FROM CHAIR USING ARMS: A LOT
SUGGESTED CMS G CODE MODIFIER DAILY ACTIVITY: CK
TOILETING: A LOT
MOVING FROM LYING ON BACK TO SITTING ON SIDE OF FLAT BED: UNABLE
DAILY ACTIVITIY SCORE: 16
SUGGESTED CMS G CODE MODIFIER MOBILITY: CM
TURNING FROM BACK TO SIDE WHILE IN FLAT BAD: A LOT

## 2022-04-12 ASSESSMENT — PAIN DESCRIPTION - PAIN TYPE
TYPE: ACUTE PAIN

## 2022-04-12 ASSESSMENT — GAIT ASSESSMENTS: GAIT LEVEL OF ASSIST: UNABLE TO PARTICIPATE

## 2022-04-12 ASSESSMENT — LIFESTYLE VARIABLES: SUBSTANCE_ABUSE: 1

## 2022-04-12 ASSESSMENT — ACTIVITIES OF DAILY LIVING (ADL): TOILETING: INDEPENDENT

## 2022-04-12 NOTE — CARE PLAN
The patient is Watcher - Medium risk of patient condition declining or worsening    Shift Goals  Clinical Goals: Tele monitoring, electrolyte balancing  Patient Goals: Rest, comfort    Progress made toward(s) clinical / shift goals:     Problem: Fall Risk  Goal: Patient will remain free from falls  Outcome: Progressing     Problem: Depression  Goal: Patient and family/caregiver will verbalize accurate information about at least two of the possible causes of depression, three-four of the signs and symptoms of depression  Outcome: Progressing     Problem: Pain - Standard  Goal: Alleviation of pain or a reduction in pain to the patient’s comfort goal  Outcome: Progressing

## 2022-04-12 NOTE — THERAPY
"Physical Therapy   Initial Evaluation     Patient Name: Maral Herrera  Age:  56 y.o., Sex:  female  Medical Record #: [id number]  Today's Date: 4/12/2022     Precautions  Precautions: Fall Risk  Comments: CVA w/ residual R hemiparesis    Assessment  Patient is 56 y.o. female admitted with SOB, edema, found acute on chronic systolic CHF. PMHx of cardiomyopathy, meth use, EF 15%, R MCA CVA 6/21 with R hemibody weakness. Pt required ModA for bed mobility, STS, and MaxA for transfers via B HHA; pt with strong L lean and limited RLE use in standing. Pt stating she uses a w/c in the community (reports leaving motel 1x/mo), and uses [employer] walker inside with assistance as w/c does not fit inside. Pt lives on 3rd floor motel and reports elevator has been broken since December. Pt did report that her and roommates are looking for a new place. Recommend placement as pt unable to currently negotiate 2 FOS. Will continue to follow.    Plan    Recommend Physical Therapy 3 times per week until therapy goals are met for the following treatments:  Bed Mobility, Equipment, Gait Training, Manual Therapy, Neuro Re-Education / Balance, Self Care/Home Evaluation, Stair Training, Therapeutic Activities, and Therapeutic Exercises    DC Equipment Recommendations: Unable to determine at this time  Discharge Recommendations: Recommend post-acute placement for additional physical therapy services prior to discharge home       Subjective    \"I don't like having to rely on my friends.\"     Objective     04/12/22 0840   Total Time Spent   Total Time Spent (Mins) 28   Charge Group   PT Evaluation PT Evaluation Mod   PT Self Care / Home Evaluation  1  (10 min edu)   Initial Contact Note    Initial Contact Note Order Received and Verified, Physical Therapy Evaluation in Progress with Full Report to Follow.   Precautions   Precautions Fall Risk   Comments CVA w/ residual R hemiparesis   Vitals   O2 Delivery Device None - Room Air   Pain 0 - 10 Group "   Therapist Pain Assessment Post Activity Pain Same as Prior to Activity;Nurse Notified  (pain not rated, agreeable to mobility)   Prior Living Situation   Prior Services Intermittent Physical Support for ADL Per Family   Housing / Facility Motel  (3rd floor)   Steps Into Home   (2 FOS)   Elevator No  (Elevator has been broken since december)   Bathroom Set up Bathtub / Shower Combination;Shower Chair   Equipment Owned Wheelchair;Power Wheel Chair  (hemiwalker)   Lives with - Patient's Self Care Capacity Friends   Comments Pt reports she has 2 rommates who assist her at baseline, tearful speaking about it as she does not want to have to rely on them. Reports only leaves her home 1x/mo   Prior Level of Functional Mobility   Bed Mobility Independent   Transfer Status Required Assist   Ambulation Required Assist   Distance Ambulation (Feet)   (household distances)   Assistive Devices Used   (hemiwalker)   Wheelchair Independent   Stairs Required Assist   Cognition    Cognition / Consciousness X   Level of Consciousness Alert   Safety Awareness Impaired   New Learning Impaired   Attention Impaired   Sequencing Impaired   Comments Pleasant and cooperative, very thankful throughout session. emotionally labile, crying intermittently   Passive ROM Lower Body   Passive ROM Lower Body X   Comments R DF lacking ~ 10 deg, PF lacking ~10 deg as well.   Active ROM Lower Body    Active ROM Lower Body  X   Comments R ankle DF/PF no active movement, R knee extension lacking ~10 deg. LLE WDL   Strength Lower Body   Lower Body Strength  X   Comments R ankle DF/PF 0/5, knee ext 2-/5. LLE WDL   Sensation Lower Body   Lower Extremity Sensation   WDL   Comments intact to LT, denies N/T   Lower Body Muscle Tone   Lower Body Muscle Tone  X   Rt Lower Extremity Muscle Tone Hypotonic   Strength Upper Body   Upper Body Strength  X   Comments RUE grossly weak baseline from CVA   Coordination Upper Body   Coordination X   Fine Motor Coordination  RUE impaired baseline   Coordination Lower Body    Coordination Lower Body  X   Comments RLE impaired baseline   Balance Assessment   Sitting Balance (Static) Fair   Sitting Balance (Dynamic) Fair -   Standing Balance (Static) Poor -   Standing Balance (Dynamic) Trace +   Weight Shift Sitting Fair   Weight Shift Standing Poor   Comments via B HHA   Gait Analysis   Gait Level Of Assist Unable to Participate   Weight Bearing Status no restrictions   Comments Few steps to transfer only   Bed Mobility    Supine to Sit Moderate Assist   Sit to Supine   (NT, up in chair post)   Scooting Minimal Assist   Comments HOB raised   Functional Mobility   Sit to Stand Moderate Assist   Bed, Chair, Wheelchair Transfer Maximal Assist   Transfer Method Stand Step   Mobility via B HHA transfers   Comments Pt took few steps to transfer to BSC toward L with significant L lean, then transfered to bed side chair toward L via squat pivot   How much difficulty does the patient currently have...   Turning over in bed (including adjusting bedclothes, sheets and blankets)? 2   Sitting down on and standing up from a chair with arms (e.g., wheelchair, bedside commode, etc.) 1   Moving from lying on back to sitting on the side of the bed? 1   How much help from another person does the patient currently need...   Moving to and from a bed to a chair (including a wheelchair)? 2   Need to walk in a hospital room? 2   Climbing 3-5 steps with a railing? 1   6 clicks Mobility Score 9   Activity Tolerance   Comments limited by fatigue, weakness   Patient / Family Goals    Patient / Family Goal #1 To go to the bathroom   Goal #1 Outcome Goal met   Short Term Goals    Short Term Goal # 1 Pt will perform supine <> sit with SPV in 6 visits to improve functional independence   Short Term Goal # 2 Pt will perform STS with LUE support and Ronen in 6 visits to improve functional mobility   Short Term Goal # 3 Pt will perform transfers with LUE support and Ronen in  6 visits to improve functional independence   Short Term Goal # 4 Pt will ambulate 15ft with LUE support and Ronen in 6 visits to initiate short household distance ambulation   Short Term Goal # 5 Pt will negotiate 2 FOS with BUE support and ModA in 6 visits to safely enter/exit home  (as needed given d/c disposition)   Education Group   Education Provided Role of Physical Therapist;Transfer Status   Role of Physical Therapist Patient Response Patient;Eager;Explanation;Action Demonstration   Transfer Status Patient Response Patient;Acceptance;Explanation;Action Demonstration   Additional Comments Pt edu on PT POC, d/c planning, importane of OOB mobility   Problem List    Problems Pain;Impaired Bed Mobility;Impaired Transfers;Impaired Ambulation;Functional ROM Deficit;Functional Strength Deficit;Impaired Balance;Impaired Coordination;Decreased Activity Tolerance;Motor Planning / Sequencing   Anticipated Discharge Equipment and Recommendations   DC Equipment Recommendations Unable to determine at this time   Discharge Recommendations Recommend post-acute placement for additional physical therapy services prior to discharge home   Interdisciplinary Plan of Care Collaboration   IDT Collaboration with  Nursing;Occupational Therapist   Patient Position at End of Therapy Seated;Chair Alarm On;Call Light within Reach;Tray Table within Reach;Phone within Reach   Collaboration Comments RN updated   Session Information   Date / Session Number  4/12 1 (1/3, 4/18)

## 2022-04-12 NOTE — DISCHARGE PLANNING
Anticipated Discharge Disposition: [facility]    Action: Discussed pt in IDT rounds. Anticipate the pt will be medically clear to DC 4/13/22. Per Barb Kovacs has a bed for this pt tomorrow and requested 1200 transportation time. LSW notified DO Denys and DANNY Chu. LSW met with pt at bedside to provide update. Transportation forms faxed to ride line.    Barriers to Discharge: medical clearance, verify transportation.    Plan: Care coordination will follow up with ride line to confirm transportation time.

## 2022-04-12 NOTE — CONSULTS
Reason for Consult:  Asked by Dr Ramonita Montiel M.D. to see this patient with longstanding cardiomyopathy weekly decompensated  Patient's PCP: Pcp Pt States None    CC:   Chief Complaint   Patient presents with   • Shortness of Breath     BIBA for SOB and FVO for the past 4 days. States she hasnt' been taking her cardiac meds for the past few months. Given 4 nitro tablets en route       HPI: This is a 56-year-old woman with history of cardiomyopathy presumably due to alcohol and prior methamphetamine abuse previously evaluated at [facility] on multiple occasions with left bundle branch block and presents with shortness of breath edema and acute she has poor medical literacy does report her primary care doctor does not report regular cardiology follow-up he denies any recent methamphetamine abuse and uses for pain management she does report marijuana use    Medications / Drug list prior to admission:  No current facility-administered medications on file prior to encounter.     No current outpatient medications on file prior to encounter.       Current list of administered Medications:    Current Facility-Administered Medications:   •  aspirin EC (ECOTRIN) tablet 81 mg, 81 mg, Oral, DAILY, Ramonita Montiel M.D., 81 mg at 04/11/22 1228  •  atorvastatin (LIPITOR) tablet 20 mg, 20 mg, Oral, Q EVENING, Ramonita Montiel M.D., 20 mg at 04/11/22 1619  •  amoxicillin-clavulanate (AUGMENTIN) 875-125 MG per tablet 1 Tablet, 1 Tablet, Oral, Q12HRS, Ramonita Montiel M.D., 1 Tablet at 04/11/22 1228  •  doxycycline monohydrate (ADOXA) tablet 100 mg, 100 mg, Oral, Q12HRS, Ramonita Montiel M.D., 100 mg at 04/11/22 1228  •  furosemide (LASIX) tablet 20 mg, 20 mg, Oral, Q DAY, Ramonita Montiel M.D., 20 mg at 04/11/22 1228  •  budesonide-formoterol (SYMBICORT) 80-4.5 MCG/ACT inhaler 2 Puff, 2 Puff, Inhalation, BID (RT), Ramonita Montiel M.D.  •  tiotropium (Spiriva Respimat) 2.5 mcg/Act inhalation spray 5 mcg, 5  mcg, Inhalation, QDAILY (RT), Ramonita Montiel M.D.  •  lisinopril (PRINIVIL) tablet 2.5 mg, 2.5 mg, Oral, Q DAY, Ramonita Montiel M.D., 2.5 mg at 04/11/22 1459  •  phytonadione (MEPHYTON) tablet 10 mg, 10 mg, Oral, DAILY, Rmaonita Montiel M.D., 10 mg at 04/11/22 1734  •  oxyCODONE-acetaminophen (PERCOCET) 5-325 MG per tablet 1 Tablet, 1 Tablet, Oral, Q4HRS PRN, Cruz Rose M.D.  •  senna-docusate (PERICOLACE or SENOKOT S) 8.6-50 MG per tablet 2 Tablet, 2 Tablet, Oral, BID, 2 Tablet at 04/11/22 1620 **AND** polyethylene glycol/lytes (MIRALAX) PACKET 1 Packet, 1 Packet, Oral, QDAY PRN **AND** magnesium hydroxide (MILK OF MAGNESIA) suspension 30 mL, 30 mL, Oral, QDAY PRN **AND** bisacodyl (DULCOLAX) suppository 10 mg, 10 mg, Rectal, QDAY PRN, AKSHAT Cosme.O.  •  Respiratory Therapy Consult, , Nebulization, Continuous RT, AKSHAT Cosme.O.  •  rivaroxaban (XARELTO) tablet 10 mg, 10 mg, Oral, DAILY, AKSHAT Cosme.ODriss, 10 mg at 04/11/22 0441  •  acetaminophen (Tylenol) tablet 650 mg, 650 mg, Oral, Q6HRS PRN, AKSHAT Cosme.O., 650 mg at 04/11/22 1622  •  hydrALAZINE (APRESOLINE) injection 10 mg, 10 mg, Intravenous, Q4HRS PRN, AKSHAT Cosme.O.  •  ondansetron (ZOFRAN) syringe/vial injection 4 mg, 4 mg, Intravenous, Q4HRS PRN, AKSHAT Cosme.O.  •  ondansetron (ZOFRAN ODT) dispertab 4 mg, 4 mg, Oral, Q4HRS PRN, Fredy Urrutia D.O.  •  promethazine (PHENERGAN) tablet 12.5-25 mg, 12.5-25 mg, Oral, Q4HRS PRN, THIEN CosmeO.  •  promethazine (PHENERGAN) suppository 12.5-25 mg, 12.5-25 mg, Rectal, Q4HRS PRN, Fredy Urrutia D.O.  •  prochlorperazine (COMPAZINE) injection 5-10 mg, 5-10 mg, Intravenous, Q4HRS PRN, THIEN CosmeO.  •  spironolactone (ALDACTONE) tablet 50 mg, 50 mg, Oral, Q DAY, THIEN CosmeODriss, 50 mg at 04/11/22 0441    Past Medical History:   Diagnosis Date   • Brain tumor (HCC) 2012   • Congestive heart failure (HCC)        Past Surgical History:   Procedure  Laterality Date   • CRANIOTOMY TUMOR  2012     Family history she denies any family history of weakened heart muscle  Patient family history was personally reviewed, no pertinent family history to current presentation    Social History     Tobacco Use   • Smoking status: Current Some Day Smoker   • Smokeless tobacco: Never Used   Vaping Use   • Vaping Use: Never used   Substance Use Topics   • Alcohol use: Yes     Comment: 4 wine coolers a week   • Drug use: Yes     Comment: edibles, meth       ALLERGIES:  No Known Allergies    Review of systems:  A complete review of symptoms was reviewed with patient  This is reviewed in H&P and PMH. ALL OTHERS reviewed and negative    Physical exam:  Patient Vitals for the past 24 hrs:   BP Temp Temp src Pulse Resp SpO2 Weight   04/11/22 1933 129/95 36.9 °C (98.4 °F) Temporal (!) 103 18 96 % 91.2 kg (201 lb 1 oz)   04/11/22 1610 135/98 36.5 °C (97.7 °F) Temporal 100 18 93 % --   04/11/22 1124 130/90 36.7 °C (98.1 °F) Temporal (!) 103 18 94 % --   04/11/22 0814 121/88 36.6 °C (97.9 °F) Temporal (!) 104 17 93 % --   04/11/22 0432 147/107 36.5 °C (97.7 °F) Temporal (!) 102 18 93 % --   04/10/22 2357 133/101 36.2 °C (97.2 °F) Temporal (!) 103 18 92 % --     General: No acute distress.   EYES: no jaundice  HEENT: OP clear   Neck:  No JVD.   CVS:   RRR. S1 + S2. No M/R/G  Resp: Normal respiratory effort, CTAB. No wheezing or crackles/rhonchi.  Abdomen: Soft, ND,  Skin: She has bandages on the legs  Neurological: Alert, Moves all extremities  Extremities:   Moderate edema. No cyanosis.       Data:  Laboratory studies personally reviewed by me:  Recent Results (from the past 24 hour(s))   CBC without Differential    Collection Time: 04/11/22  2:34 AM   Result Value Ref Range    WBC 5.2 4.8 - 10.8 K/uL    RBC 3.84 (L) 4.20 - 5.40 M/uL    Hemoglobin 13.6 12.0 - 16.0 g/dL    Hematocrit 41.5 37.0 - 47.0 %    .1 (H) 81.4 - 97.8 fL    MCH 35.4 (H) 27.0 - 33.0 pg    MCHC 32.8 (L) 33.6 -  35.0 g/dL    RDW 62.9 (H) 35.9 - 50.0 fL    Platelet Count 103 (L) 164 - 446 K/uL    MPV 11.9 9.0 - 12.9 fL   Comp Metabolic Panel (CMP)    Collection Time: 04/11/22  2:34 AM   Result Value Ref Range    Sodium 137 135 - 145 mmol/L    Potassium 4.0 3.6 - 5.5 mmol/L    Chloride 103 96 - 112 mmol/L    Co2 21 20 - 33 mmol/L    Anion Gap 13.0 7.0 - 16.0    Glucose 113 (H) 65 - 99 mg/dL    Bun 5 (L) 8 - 22 mg/dL    Creatinine 0.35 (L) 0.50 - 1.40 mg/dL    Calcium 8.2 (L) 8.5 - 10.5 mg/dL    AST(SGOT) 58 (H) 12 - 45 U/L    ALT(SGPT) 22 2 - 50 U/L    Alkaline Phosphatase 157 (H) 30 - 99 U/L    Total Bilirubin 8.6 (H) 0.1 - 1.5 mg/dL    Albumin 2.5 (L) 3.2 - 4.9 g/dL    Total Protein 7.1 6.0 - 8.2 g/dL    Globulin 4.6 (H) 1.9 - 3.5 g/dL    A-G Ratio 0.5 g/dL   Magnesium    Collection Time: 04/11/22  2:34 AM   Result Value Ref Range    Magnesium 1.6 1.5 - 2.5 mg/dL   ESTIMATED GFR    Collection Time: 04/11/22  2:34 AM   Result Value Ref Range    GFR (CKD-EPI) 119 >60 mL/min/1.73 m 2   EC-ECHOCARDIOGRAM COMPLETE W/ CONT    Collection Time: 04/11/22 12:33 PM   Result Value Ref Range    Eject.Frac. MOD BP 34.79     Eject.Frac. MOD 4C 25.32     Eject.Frac. MOD 2C 38.53     Left Ventrical Ejection Fraction 15    HEMOGLOBIN A1C    Collection Time: 04/11/22  2:17 PM   Result Value Ref Range    Glycohemoglobin 4.6 4.0 - 5.6 %    Est Avg Glucose 85 mg/dL   HEPATITIS PANEL ACUTE(4 COMPONENTS)    Collection Time: 04/11/22  2:17 PM   Result Value Ref Range    Hepatitis B Surface Antigen Non-Reactive Non-Reactive    Hepatitis B Cors Ab,IgM Non-Reactive Non-Reactive    Hepatitis A Virus Ab, IgM Non-Reactive Non-Reactive    Hepatitis C Antibody Reactive (A) Non-Reactive   Prothrombin Time    Collection Time: 04/11/22  2:17 PM   Result Value Ref Range    PT 63.7 () 12.0 - 14.6 sec    INR 7.84 () 0.87 - 1.13   URINE DRUG SCREEN    Collection Time: 04/11/22  3:00 PM   Result Value Ref Range    Amphetamines Urine Positive (A) Negative     Barbiturates Negative Negative    Benzodiazepines Negative Negative    Cocaine Metabolite Negative Negative    Methadone Negative Negative    Opiates Negative Negative    Oxycodone Negative Negative    Phencyclidine -Pcp Negative Negative    Propoxyphene Negative Negative    Cannabinoid Metab Positive (A) Negative       Imaging:  US-EXTREMITY VENOUS LOWER BILAT   Final Result      EC-ECHOCARDIOGRAM COMPLETE W/ CONT   Final Result      US-RUQ   Final Result      1.  The liver is enlarged and heterogenously echogenic.  Differential diagnosis includes most likely hepatic steatosis versus other diffuse hepatocellular disease.   2.  There is cholelithiasis.         CT-CTA CHEST PULMONARY ARTERY W/ RECONS   Final Result      1.  Negative for pulmonary embolism      2.  Mild atelectasis and very small bilateral pleural effusion      3.  Significant left ventricular dilation      4.  Cirrhosis and hepatic steatosis            DX-CHEST-PORTABLE (1 VIEW)   Final Result      1.  Cardiac silhouette enlargement.   2.  No acute abnormality.              EKG tracings personally reviewed by me sinus rhythm with left bundle branch block    Echocardiogram images personally reviewed by me show severely reduced ejection fraction less than 20%    All pertinent features of laboratory and imaging reviewed including primary images where applicable      Principal Problem:    Acute on chronic systolic heart failure (HCC) POA: Unknown  Active Problems:    Hypokalemia POA: Yes    Liver cirrhosis (HCC) POA: Unknown    Obesity (BMI 30.0-34.9) POA: Unknown    Hypocalcemia POA: Unknown    Methamphetamine abuse (HCC) POA: Unknown    Macrocytosis POA: Unknown    Leg edema POA: Unknown    Dilated cardiomyopathy (HCC) POA: Unknown    History of CVA (cerebrovascular accident) POA: Unknown    Severe protein-calorie malnutrition (HCC) POA: Unknown    Thrombocytopenia (HCC) POA: Unknown    Open wound of fifth toe of right foot POA: Unknown     Noncompliance POA: Unknown  Resolved Problems:    * No resolved hospital problems. *      Assessment / Plan:  Acute on chronic systolic heart failure in the setting of methamphetamine abuse  Restart CHF regimen  Diuresis tolerated  Strict alcohol and methamphetamine cessation        I strongly reinforced with her that her overall mortality is quite high and that her substance abuse is causing severe organ dysfunction and likely early death.  She does report regular follow-up with primary care or cardiologist we can set her up with a heart failure follow-up and advised on reinforced with her the importance of compliance with this    Cardiology will sign off    I personally discussed her case with  Dr Ramonita Montiel M.D.    Future Appointments   Date Time Provider Department Center   4/20/2022  9:00 AM Shaw Yang M.D. [facility] None   4/22/2022 10:30 AM Sravanthi Rayo P.A.-C. [id number]       It is my pleasure to participate in the care of Ms. Herrera.  Please do not hesitate to contact me with questions or concerns.    Anup Brooks MD PhD [facility]  Cardiologist [facility]    4/11/2022    Please note that this dictation was created using voice recognition software. There may be errors I did not discover before finalizing the note.

## 2022-04-12 NOTE — THERAPY
Occupational Therapy   Initial Evaluation     Patient Name: Maral Herrera  Age:  56 y.o., Sex:  female  Medical Record #: [id number]  Today's Date: 4/12/2022     Precautions  Precautions: Fall Risk  Comments: CVA w/ residual R deficits    Assessment  Patient is 56 y.o. female admitted with methamphetamines on board for SOB, weakness, and BLE edema, found to have acute on chronic systolic HF, multi-organ failure, hypocalcemia, thrombocytopenia, liver cirrhosis, and hypokalemia. PMHx of cardiomyopathy, HFrEF, CVA w/ residual R deficits, ETOH/meth abuse, liver cirrhosis, obesity, L BBB, and non-compliance with meds. Fatigues quickly in standing; reports uses w/c or power chair out of home, but walker while inside. Kerry has 3 male roommates who complete/assist with IADL/txfs at baseline, and has a female friend who assists with ADLs intermittently. Kerry only leaves [facility] room 1x/mo, but they are currently looking for another place to live. Currently limited by decreased functional mobility, activity tolerance, cognition, strength, AROM, coordination, balance, and pain which are currently affecting pt's ability to complete ADLs/IADLs at baseline. Will continue to follow.     Plan    Recommend Occupational Therapy 3 times per week until therapy goals are met for the following treatments:  Adaptive Equipment, Neuro Re-Education / Balance, Self Care/Activities of Daily Living, Therapeutic Activities, and Therapeutic Exercises.    DC Equipment Recommendations: Unable to determine at this time  Discharge Recommendations: Recommend post-acute placement for additional occupational therapy services prior to discharge home      Objective     04/12/22 0812   Prior Living Situation   Prior Services Intermittent Physical Support for ADL Per Family   Housing / Facility Motel  (3rd floor)   Steps Into Home   (2 flights)   Bathroom Set up Bathtub / Shower Combination;Shower Chair   Equipment Owned Wheelchair;Power Wheel Chair;Tub /  Shower Seat   Lives with - Patient's Self Care Capacity Friends   Comments Reports has 2 male roommates who complete/assist with IADL/txfs at baseline, and has a female friend who assists wtih ADLs intermittently. Reports only leaves motel room 1x/mo.   Prior Level of ADL Function   Self Feeding Independent   Grooming / Hygiene Independent   Bathing Requires Assist   Dressing Requires Assist   Toileting Independent   Prior Level of IADL Function   Medication Management Requires Assist   Laundry Dependent   Kitchen Mobility Requires Assist   Finances Requires Assist   Home Management Dependent   Shopping Dependent   Prior Level Of Mobility Supervision With Device in Home   Driving / Transportation Utilizes Public Transportation   Precautions   Precautions Fall Risk   Comments CVA w/ residual R deficits   Vitals   O2 Delivery Device None - Room Air   Pain 0 - 10 Group   Therapist Pain Assessment Prior to Activity;During Activity;Nurse Notified  (not quantified)   Cognition    Cognition / Consciousness X   Level of Consciousness Alert   Safety Awareness Impaired   New Learning Impaired   Attention Impaired   Sequencing Impaired  (especially taking steps)   Passive ROM Upper Body   Passive ROM Upper Body WDL   Active ROM Upper Body   Active ROM Upper Body  X   Dominant Hand Left   Comments RUE deficits at baseline. Decreased AROM in shoulder, wrist, and fingers   Strength Upper Body   Upper Body Strength  X   Comments RUE grossly impaired at baseline   Upper Body Muscle Tone   Upper Body Muscle Tone  X   Rt Upper Extremity Muscle Tone Hypertonic   Comments in wrist/fingers and shoulder   Coordination Upper Body   Coordination X   Fine Motor Coordination RUE limited at baseline; learned to use LUE as dominant hand   Balance Assessment   Sitting Balance (Static) Fair   Sitting Balance (Dynamic) Fair -   Standing Balance (Static) Poor -   Standing Balance (Dynamic) Trace +   Weight Shift Sitting Fair   Weight Shift  Standing Poor   Comments w/ B HHA and mod A   Bed Mobility    Supine to Sit Moderate Assist   Sit to Supine   (left in chair)   Scooting Minimal Assist   ADL Assessment   Eating Supervision  (drinking open cranberry juice)   Grooming Supervision;Seated  (wiping face)   Lower Body Dressing Maximal Assist  (socks)   Toileting Maximal Assist  (pericare)   Functional Mobility   Sit to Stand Moderate Assist   Bed, Chair, Wheelchair Transfer Maximal Assist   Toilet Transfers Maximal Assist  (BSC)   Transfer Method Stand Step   Mobility w/B HHA and mod A x2; EOB>BSC>chair   Edema / Skin Assessment   Edema / Skin  Not Assessed   Comments BLE edema   Activity Tolerance   Comments limited by weakness and fatigue   Patient / Family Goals   Patient / Family Goal #1 to get stronger   Short Term Goals   Short Term Goal # 1 LB dressing with min A   Short Term Goal # 2 BSC txf with SPV   Short Term Goal # 3 standing g/h with min A and seated RBs PRN   Education Group   Education Provided Role of Occupational Therapist;Transfers;Pathology of bedrest   Role of Occupational Therapist Patient Response Patient;Acceptance;Explanation;Verbal Demonstration;Reinforcement Needed   Transfers Patient Response Patient;Acceptance;Explanation;Reinforcement Needed;Action Demonstration   Pathology of Bedrest Patient Response Patient;Acceptance;Explanation;Verbal Demonstration;Reinforcement Needed   Problem List   Problem List Decreased Active Daily Living Skills;Decreased Homemaking Skills;Decreased Upper Extremity Strength Right;Decreased Upper Extremity Strength Left;Decreased Functional Mobility;Decreased Activity Tolerance;Decreased Upper Extremity AROM Right;Safety Awareness Deficits / Cognition;Impaired Coordination Right Upper Extremity;Impaired Cognitive Function;Impaired Postural Control / Balance;Impaired Upper Extremity Tone Right

## 2022-04-12 NOTE — DISCHARGE PLANNING
Agency/Facility Name: [facility]   Outcome: Intake in morning meeting, DPA to callback after meeting.     1455-  Agency/Facility Name: [facility]  Spoke To: Janet   Outcome: Pt accepted with bed available tomorrow, admissions requesting 1200 transport time tomorrow afternoon.

## 2022-04-12 NOTE — CARE PLAN
The patient is Stable - Low risk of patient condition declining or worsening    Shift Goals  Clinical Goals: Tele monitoring, electrolyte balancing  Patient Goals: Rest, comfort      Problem: Knowledge Deficit - Standard  Goal: Patient and family/care givers will demonstrate understanding of plan of care, disease process/condition, diagnostic tests and medications  Outcome: Progressing  Note:   Patient has been able to demonstrate understanding of care plan and disease process/condition, throughout shift.       Problem: Pain - Standard  Goal: Alleviation of pain or a reduction in pain to the patient’s comfort goal  Outcome: Progressing  Note: Patient has been able to verbalize acceptable level of pain relief on a scale of 0 to 10,  along with their ability to engage in desired activies.

## 2022-04-12 NOTE — PROGRESS NOTES
[facility] Medicine Daily Progress Note    Date of Service  4/12/2022    Chief Complaint  Maral Herrera is a 56 y.o. female admitted 4/10/2022 with SOB and weakness    Hospital Course:    56-year-old female with history of drug/meth abuse, dilated cardiomyopathy, liver cirrhosis and smoking with noncompliance presented 4/10 with worsening shortness of breath and generalized weakness, denied any fever or chills, no significant chest pain however she has generalized weakness and lower extremity edema, patient noticed redness and swelling on the right leg, no urinary or bowel symptoms on admission labs did not show leukocytosis, creatinine was normal however magnesium was 0.9 troponin around normal 23 and EKG did not show any ischemic changes however showed left bundle branch block, echo is pending, patient received IV Lasix and spironolactone, potassium was replaced.    Patient has history of alcoholism, on admission jaundice was noticed, bilirubin was elevated 5.9 with mildly elevated liver enzymes, ultrasound showed signs of cirrhosis.    Patient has significant social issues and she lives with roommates versus motel?   was consulted, PT OT      Interval Problem Update  4/11 Evaluated examined the patient at bedside, patient feels weaker, PT and OT was ordered, patient is alert oriented x4  -Worsening on her bilirubin  -Echo ejection fraction 15%, cardiology was consulted  -Start Lasix and continue spironolactone  -Start aspirin and atorvastatin  -Start inhalers for wheezing  -The CODE STATUS was discussed with the patient, she was not able to provide an answer, she asked me to call her brother, called Mr. Thomas however no response, voicemail was left.   -Palliative was consulted    4/12 Cardiology recommended medication management and outpatient follow up. PT/OT and palliative care eval pending. Patient seen by wound care yesterday, they believe wounds likely from venous stasis. Blood pressure stable  start metoprolol XL.      I have personally seen and examined the patient at bedside. I discussed the plan of care with patient, bedside RN, charge RN,  and pharmacy.    Consultants/Specialty  cardiology      Code Status  Full Code    Disposition  Patient is not medically cleared for discharge.   Anticipate discharge to to skilled nursing facility vs rehab .  I have placed the appropriate orders for post-discharge needs.    Review of Systems  Review of Systems   Constitutional: Positive for malaise/fatigue. Negative for weight loss.   Respiratory: Positive for cough, sputum production and shortness of breath. Negative for hemoptysis.    Cardiovascular: Positive for leg swelling.   Gastrointestinal: Negative for abdominal pain and nausea.   Musculoskeletal: Positive for myalgias. Negative for neck pain.   Neurological: Negative for dizziness and headaches.   Psychiatric/Behavioral: Positive for substance abuse.        Physical Exam  Temp:  [36.1 °C (97 °F)-36.9 °C (98.5 °F)] 36.5 °C (97.7 °F)  Pulse:  [] 93  Resp:  [17-18] 17  BP: (118-158)/(74-98) 131/74  SpO2:  [93 %-96 %] 94 %    Physical Exam  Constitutional:       General: She is not in acute distress.     Appearance: She is ill-appearing.   HENT:      Head: Normocephalic and atraumatic.   Eyes:      General: Scleral icterus present.   Cardiovascular:      Rate and Rhythm: Normal rate and regular rhythm.   Pulmonary:      Effort: Pulmonary effort is normal. No respiratory distress.      Breath sounds: Rales present. No wheezing.   Abdominal:      General: Bowel sounds are normal. There is no distension.      Palpations: Abdomen is soft.      Tenderness: There is no abdominal tenderness.   Musculoskeletal:      Right lower leg: Edema present.      Left lower leg: Edema present.   Skin:     Coloration: Skin is jaundiced and pale.      Findings: Erythema present. No lesion or rash.      Comments: Compression bandage in place b/l LE     Neurological:      General: No focal deficit present.      Mental Status: She is alert and oriented to person, place, and time. Mental status is at baseline.      Cranial Nerves: No cranial nerve deficit.      Motor: No weakness.   Psychiatric:         Mood and Affect: Mood normal.         Behavior: Behavior normal.         Fluids    Intake/Output Summary (Last 24 hours) at 4/12/2022 1241  Last data filed at 4/12/2022 0938  Gross per 24 hour   Intake 400 ml   Output 200 ml   Net 200 ml       Laboratory  Recent Labs     04/10/22  0700 04/11/22  0234 04/12/22  0254   WBC 5.0 5.2 4.9   RBC 3.44* 3.84* 3.40*   HEMOGLOBIN 12.3 13.6 12.1   HEMATOCRIT 37.5 41.5 36.3*   .0* 108.1* 106.8*   MCH 35.8* 35.4* 35.6*   MCHC 32.8* 32.8* 33.3*   RDW 64.8* 62.9* 61.6*   PLATELETCT 81* 103* 102*   MPV 11.7 11.9 11.6     Recent Labs     04/10/22  0751 04/10/22  1549 04/11/22  0234 04/12/22  0254   SODIUM 141  --  137 136   POTASSIUM 2.5* 3.9 4.0 4.1   CHLORIDE 111  --  103 104   CO2 20  --  21 23   GLUCOSE 94  --  113* 114*   BUN 5*  --  5* 7*   CREATININE 0.22*  --  0.35* 0.52   CALCIUM 6.7*  --  8.2* 8.1*     Recent Labs     04/11/22  1417 04/12/22  0254   INR 7.84* 5.79*         Recent Labs     04/12/22  0254   TRIGLYCERIDE 77   HDL 32*   LDL 36       Imaging  US-EXTREMITY VENOUS LOWER BILAT   Final Result      EC-ECHOCARDIOGRAM COMPLETE W/ CONT   Final Result      US-RUQ   Final Result      1.  The liver is enlarged and heterogenously echogenic.  Differential diagnosis includes most likely hepatic steatosis versus other diffuse hepatocellular disease.   2.  There is cholelithiasis.         CT-CTA CHEST PULMONARY ARTERY W/ RECONS   Final Result      1.  Negative for pulmonary embolism      2.  Mild atelectasis and very small bilateral pleural effusion      3.  Significant left ventricular dilation      4.  Cirrhosis and hepatic steatosis            DX-CHEST-PORTABLE (1 VIEW)   Final Result      1.  Cardiac silhouette  enlargement.   2.  No acute abnormality.           Assessment/Plan  * Acute on chronic systolic heart failure (HCC)  Assessment & Plan  With exacerbation and mild pulmonary edema   dilated cardiomyopathy,   History noncompliance, off medications since December, methamphetamine use, medical noncompliance, low albumin.  Diuretics Lasix orally at this time, patient is on room air  Continue lisinopril, aldactone   Start metoprolol XL  EKG showed left bundle branch block   Echo EF 15%  cardiology consulted, medication management, outpt follow up, drug cessation    Noncompliance  Assessment & Plan  Patient has multiorgan failures with drug abuse and noncompliance with medications  Patient lives with her roommate versus motel  PT and OT evaluation      Open wound of fifth toe of right foot  Assessment & Plan  Redness and drainage coming from her r shine   wound care.  Doxycycline for infection and cellulitis    Thrombocytopenia (HCC)  Assessment & Plan  Due to liver failure   no bleeding  Close monitoring     Severe protein-calorie malnutrition (HCC)  Assessment & Plan  Drug abuse and liver and heart failure   albumin 1.8  Nutrition consulted   encouraged the patient to quit drinking and drugs  Monitor.    History of CVA (cerebrovascular accident)  Assessment & Plan  According to the patient patient had brain surgery in 2012 for a brain tumor  States she can only walk short distances.  PT and OT    Dilated cardiomyopathy (HCC)  Assessment & Plan  Likely related to drugs and meth abuse   noncompliance with medication  echo showed ejection fraction 15%  Continue lasix, aldactone, lisinopril   Start metoprolol  Cardiology was consulted    Leg edema  Assessment & Plan  Likely related to liver cirrhosis and heart failure   Spironolactone and Lasix with close monitoring   Echo ordered  Wound care and antibiotics for cellulitis    Macrocytosis  Assessment & Plan  Fatty liver vs other.  Recommend outpatient follow up  for B12 and folate evaluation.  Start MVI and B12 in house for supplement.    Methamphetamine abuse (HCC)  Assessment & Plan  Patient denied smoking meth so often  Urine drug screen + for meth, cannabis  Discussed drug cessation at length    Hypocalcemia  Assessment & Plan  Corrected calcium 8.5  monitor    Obesity (BMI 30.0-34.9)  Assessment & Plan  Body mass index is 32.28 kg/m².    Liver cirrhosis (HCC)  Assessment & Plan  Patient states she has history of alcoholism years ago    labs showed mildly elevated liver enzymes and total bilirubin 8  Check INR  Ultrasound showed signs of cirrhosis  Needs to follow-up with GI as outpatient    Hypokalemia- (present on admission)  Assessment & Plan  Resolved  Labs daily       VTE prophylaxis: SCDs/TEDs and heparin ppx    I have performed a physical exam and reviewed and updated ROS and Plan today (4/12/2022). In review of yesterday's note (4/11/2022), there are no changes except as documented above.

## 2022-04-12 NOTE — DISCHARGE PLANNING
DC Transport Scheduled    Received request at: 1543    Transport Company Scheduled:  [employer]    Scheduled Date: 04/13/2022  Scheduled Time: 1200    Destination: [facility]     Notified care team of scheduled transport via Voalte.     If there are any changes needed to the DC transportation scheduled, please contact [facility] at ext. [phone] between the hours of 9082-5119 Mon-Fri. If outside those hours, contact the ED Case Manager at ext. [phone].

## 2022-04-12 NOTE — CARE PLAN
The patient is Watcher - Medium risk of patient condition declining or worsening    Shift Goals  Clinical Goals: PT/OT evaluation, pain management  Patient Goals: rest, pain management    Progress made toward(s) clinical / shift goals:  PT/OT completed this AM, patient up to chair. Patient medicated per MAR, pain addressed with current PRNs. Patient encouraged to move in

## 2022-04-12 NOTE — DISCHARGE PLANNING
[facility] Transitional Care Coordination    Referral from:  Dr. Moon Mcfarlane   Insurance Provider on Facesheet: Medicare  Potential Rehab Diagnosis: Cardiac Debility    Chart review indicates patient has on going medical management and therapy needs    D/C support: Current documentation reflects limited discharge support to facilitate safe transition back to community     Physiatry referral forwarded for consult to assist with plan of care.  Current documentation does not reflect discharge support/destination to facilitate safe return to community.  TCC will not follow.     Last Covid test:       Thank you for the referral.

## 2022-04-12 NOTE — PROGRESS NOTES
Assumed care of patient at bedside report from NOC RN. Updated on POC. Patient currently A & O x 4; on room air; up max assist with wheelchair; with complaints of acute pain. Medicated per MAR. PT/OT at bedside evaluating patient. Call light within reach. Whiteboard updated. Fall precautions in place. Bed locked and in lowest position. All questions answered. No other needs indicated at this time.

## 2022-04-12 NOTE — CONSULTS
"Reason for PC Consult: Advance Care Planning    Consulted by: Dr. Montiel; currently; Dr. Mcfarlane    Assessment:  General: 57 y/o female from home with worsening SOB and BLE edema secondary to untreated HF. Pt reports EtOH and methamphetamine abuse history and non-compliance with MD appointments, medications, etc. D/t transportation issues mainly. UDS +methamphetamies on admission and she denies EtOH abuse since \"December.\" ECHO with EF 15% and RVSP 50 mmHg. Unknown PMHx otherwise d/t lack of medical care.    Prior PC Consults: n/a    Social: Pt was residing in an apartment with 4 others, but states \"we were evicted last Sunday.\" She states her friend Ron has moved her belongings into storage. She has no children and is unmarried. Her mom is living, but elderly and she has a brother William that she is in contact with. She makes about $1100/month income.    Consults: cardiology    Dyspnea: Yes- No supplemental O2 needs; dyspnea with talking/exertion  Last BM: 04/11/22-    Pain: No-    Depression: Mood appropriate for situation-    Dementia: No;       Spiritual:  Is Congregational or spirituality important for coping with this illness? No-    Has a  or spiritual provider visit been requested? No    Palliative Performance Scale: 50%    Advance Directive: None- declined wanting to complete  DPOA: No-    POLST: No- completed at this encounter for DNR/DNI, selective focused treatment and no TF    Code Status: Full- wishes to be DNR/DNI    Outcome:  PC RN met with Maral at bedside and explained the role of PC to help with discussions about the current medical situation and goals moving forward. She was sitting up in the chair and appeared jaundices with her legs notably edematous and was dyspneic with talking. She explained her reason for admission being her swollen legs and shortness of breath. She states that she was having such a hard time moving d/t the swelling and pain that she was having episodes of incontinence " "because she couldn't make it to the bathroom in time. She is honest with her drug and alcohol use over the years and that \"this is what caused these issues.\" She does admit to methamphetamine abuse and states that \"I just took one hit the day before coming in because it opens up my lungs and makes me feel better.\" She does not follow with any physicians d/t lack of transportation. She states \"I have to pay someone to wheel me around all day to get places.\" She states that following this hospitalization and rehab, she's hoping to get a new place with one of her prior roommates. She refers to Ron as \"a good man\" and states he doesn't smoke or do anything like that, which would be helpful for her.     PC explored Maral's values, beliefs, and preferences in order to identify GOC. She was very tearful when discussing her disease process, her current state of health and plans going forward. PC shared that her disease process is manageable, but not fixable, but she would need to see physicians more regularly and be compliant with medications, but these are things she struggles to do currently. PC shared that her situation currently would likely make her a candidate for hospice/EoL care, but this not being the path she has to take if she does not choose to. PC discussed code status and she was very tearful and expressed being afraid of \"not being able to breathe when I die.\" PC explained that these symptoms are managed at EoL so patients do not struggle or suffer at EoL. In discussing code status, she states \"I don't see any need for that given how sick I am.\" PC expressed concern that it may add to her suffering and not provide any meaningful benefit to her QoL. She expressed her wish for DNR and DNI; POLST completed reflecting this wish. Additionally, she is okay with coming to the hospital for other issues at this time, but declined accepting tube feeding if ever needed. Maral remained tearful throughout the discussion " but was also very appreciative of being able to share her wishes. PC explained the plan for SNF and therapies following this visit, but noted if she had worsening symptoms or further complications, hospice could be explored.     While talking to Maral, PC RN provided therapeutic communication, including open ended questions, reflective listening, and normalization of thought and feelings throughout encounter, as well as reinforced her goals of care. PC contact information provided to Maral and encouraged to call with any questions or concerns. POLST reviewed/signed by MD and placed at [facility] [place] for time of DC.    Updated: MD/RN/BECKY    Plan: to SNF when medically cleared    Thank you for allowing Palliative Care to support this patient and family. Contact [phone] for additional assistance, change in patient status, or with any questions/concerns.

## 2022-04-13 VITALS
HEIGHT: 66 IN | BODY MASS INDEX: 32.31 KG/M2 | RESPIRATION RATE: 18 BRPM | SYSTOLIC BLOOD PRESSURE: 102 MMHG | HEART RATE: 71 BPM | DIASTOLIC BLOOD PRESSURE: 73 MMHG | WEIGHT: 201.06 LBS | OXYGEN SATURATION: 94 % | TEMPERATURE: 96.6 F

## 2022-04-13 LAB
ALBUMIN SERPL BCP-MCNC: 2.2 G/DL (ref 3.2–4.9)
ALBUMIN/GLOB SERPL: 0.6 G/DL
ALP SERPL-CCNC: 127 U/L (ref 30–99)
ALT SERPL-CCNC: 17 U/L (ref 2–50)
ANION GAP SERPL CALC-SCNC: 8 MMOL/L (ref 7–16)
AST SERPL-CCNC: 41 U/L (ref 12–45)
BILIRUB SERPL-MCNC: 5.2 MG/DL (ref 0.1–1.5)
BUN SERPL-MCNC: 10 MG/DL (ref 8–22)
CALCIUM SERPL-MCNC: 8.2 MG/DL (ref 8.5–10.5)
CHLORIDE SERPL-SCNC: 102 MMOL/L (ref 96–112)
CO2 SERPL-SCNC: 23 MMOL/L (ref 20–33)
CREAT SERPL-MCNC: 0.53 MG/DL (ref 0.5–1.4)
ERYTHROCYTE [DISTWIDTH] IN BLOOD BY AUTOMATED COUNT: 63.7 FL (ref 35.9–50)
GFR SERPLBLD CREATININE-BSD FMLA CKD-EPI: 107 ML/MIN/1.73 M 2
GLOBULIN SER CALC-MCNC: 4 G/DL (ref 1.9–3.5)
GLUCOSE SERPL-MCNC: 122 MG/DL (ref 65–99)
HCT VFR BLD AUTO: 38.4 % (ref 37–47)
HGB BLD-MCNC: 12.7 G/DL (ref 12–16)
MCH RBC QN AUTO: 36.2 PG (ref 27–33)
MCHC RBC AUTO-ENTMCNC: 33.1 G/DL (ref 33.6–35)
MCV RBC AUTO: 109.4 FL (ref 81.4–97.8)
PLATELET # BLD AUTO: 104 K/UL (ref 164–446)
PMV BLD AUTO: 11.8 FL (ref 9–12.9)
POTASSIUM SERPL-SCNC: 4.2 MMOL/L (ref 3.6–5.5)
PROT SERPL-MCNC: 6.2 G/DL (ref 6–8.2)
RBC # BLD AUTO: 3.51 M/UL (ref 4.2–5.4)
SODIUM SERPL-SCNC: 133 MMOL/L (ref 135–145)
WBC # BLD AUTO: 4.9 K/UL (ref 4.8–10.8)

## 2022-04-13 PROCEDURE — 700102 HCHG RX REV CODE 250 W/ 637 OVERRIDE(OP): Performed by: HOSPITALIST

## 2022-04-13 PROCEDURE — 700101 HCHG RX REV CODE 250: Performed by: INTERNAL MEDICINE

## 2022-04-13 PROCEDURE — 36415 COLL VENOUS BLD VENIPUNCTURE: CPT

## 2022-04-13 PROCEDURE — A9270 NON-COVERED ITEM OR SERVICE: HCPCS | Performed by: INTERNAL MEDICINE

## 2022-04-13 PROCEDURE — 99239 HOSP IP/OBS DSCHRG MGMT >30: CPT | Performed by: INTERNAL MEDICINE

## 2022-04-13 PROCEDURE — 700102 HCHG RX REV CODE 250 W/ 637 OVERRIDE(OP): Performed by: INTERNAL MEDICINE

## 2022-04-13 PROCEDURE — A9270 NON-COVERED ITEM OR SERVICE: HCPCS | Performed by: HOSPITALIST

## 2022-04-13 PROCEDURE — 85027 COMPLETE CBC AUTOMATED: CPT

## 2022-04-13 PROCEDURE — 80053 COMPREHEN METABOLIC PANEL: CPT

## 2022-04-13 RX ORDER — METOPROLOL SUCCINATE 25 MG/1
25 TABLET, EXTENDED RELEASE ORAL DAILY
Qty: 30 TABLET | Refills: 0 | Status: ON HOLD
Start: 2022-04-14 | End: 2022-06-16

## 2022-04-13 RX ORDER — AMOXICILLIN AND CLAVULANATE POTASSIUM 875; 125 MG/1; MG/1
1 TABLET, FILM COATED ORAL EVERY 12 HOURS
Qty: 10 TABLET | Refills: 0 | Status: SHIPPED
Start: 2022-04-13 | End: 2022-04-18

## 2022-04-13 RX ORDER — BUDESONIDE AND FORMOTEROL FUMARATE DIHYDRATE 80; 4.5 UG/1; UG/1
2 AEROSOL RESPIRATORY (INHALATION) 2 TIMES DAILY
Status: ON HOLD
Start: 2022-04-13 | End: 2022-06-16

## 2022-04-13 RX ORDER — DOXYCYCLINE 100 MG/1
100 TABLET ORAL EVERY 12 HOURS
Qty: 10 TABLET | Refills: 0 | Status: SHIPPED
Start: 2022-04-13 | End: 2022-04-18

## 2022-04-13 RX ORDER — LIDOCAINE 50 MG/G
1 PATCH TOPICAL EVERY 24 HOURS
Status: DISCONTINUED | OUTPATIENT
Start: 2022-04-13 | End: 2022-04-13 | Stop reason: HOSPADM

## 2022-04-13 RX ORDER — ACETAMINOPHEN 325 MG/1
650 TABLET ORAL EVERY 6 HOURS PRN
Qty: 30 TABLET | Refills: 0 | Status: ON HOLD
Start: 2022-04-13 | End: 2022-06-16

## 2022-04-13 RX ORDER — LISINOPRIL 5 MG/1
5 TABLET ORAL DAILY
Qty: 30 TABLET | Status: ON HOLD
Start: 2022-04-14 | End: 2022-06-16

## 2022-04-13 RX ORDER — GUAIFENESIN 600 MG/1
600 TABLET, EXTENDED RELEASE ORAL EVERY 12 HOURS
Status: DISCONTINUED | OUTPATIENT
Start: 2022-04-13 | End: 2022-04-13 | Stop reason: HOSPADM

## 2022-04-13 RX ORDER — ATORVASTATIN CALCIUM 20 MG/1
20 TABLET, FILM COATED ORAL EVERY EVENING
Qty: 30 TABLET | Status: ON HOLD
Start: 2022-04-13 | End: 2022-06-16

## 2022-04-13 RX ORDER — LIDOCAINE 50 MG/G
1 PATCH TOPICAL
Qty: 30 PATCH | Status: SHIPPED
Start: 2022-04-13

## 2022-04-13 RX ORDER — FUROSEMIDE 20 MG/1
20 TABLET ORAL DAILY
Qty: 60 TABLET | Status: SHIPPED
Start: 2022-04-14

## 2022-04-13 RX ORDER — GUAIFENESIN 600 MG/1
600 TABLET, EXTENDED RELEASE ORAL EVERY 12 HOURS
Qty: 14 TABLET | Refills: 0 | Status: SHIPPED
Start: 2022-04-13 | End: 2022-04-20

## 2022-04-13 RX ORDER — SPIRONOLACTONE 25 MG/1
25 TABLET ORAL DAILY
Qty: 30 TABLET | Refills: 0 | Status: ON HOLD
Start: 2022-04-14 | End: 2022-06-16

## 2022-04-13 RX ORDER — ASPIRIN 81 MG/1
81 TABLET ORAL DAILY
Qty: 30 TABLET | Status: ON HOLD
Start: 2022-04-14 | End: 2022-06-16

## 2022-04-13 RX ADMIN — PHYTONADIONE 10 MG: 5 TABLET ORAL at 05:10

## 2022-04-13 RX ADMIN — FUROSEMIDE 20 MG: 20 TABLET ORAL at 05:10

## 2022-04-13 RX ADMIN — BUDESONIDE AND FORMOTEROL FUMARATE DIHYDRATE 2 PUFF: 80; 4.5 AEROSOL RESPIRATORY (INHALATION) at 08:15

## 2022-04-13 RX ADMIN — SPIRONOLACTONE 25 MG: 25 TABLET, FILM COATED ORAL at 05:10

## 2022-04-13 RX ADMIN — LIDOCAINE 1 PATCH: 50 PATCH TOPICAL at 10:39

## 2022-04-13 RX ADMIN — RIVAROXABAN 10 MG: 10 TABLET, FILM COATED ORAL at 05:10

## 2022-04-13 RX ADMIN — ASPIRIN 81 MG: 81 TABLET, COATED ORAL at 05:10

## 2022-04-13 RX ADMIN — TIOTROPIUM BROMIDE INHALATION SPRAY 5 MCG: 3.12 SPRAY, METERED RESPIRATORY (INHALATION) at 08:15

## 2022-04-13 RX ADMIN — LISINOPRIL 5 MG: 5 TABLET ORAL at 05:10

## 2022-04-13 RX ADMIN — AMOXICILLIN AND CLAVULANATE POTASSIUM 1 TABLET: 875; 125 TABLET, FILM COATED ORAL at 05:10

## 2022-04-13 RX ADMIN — DOXYCYCLINE 100 MG: 100 TABLET, FILM COATED ORAL at 05:10

## 2022-04-13 RX ADMIN — METOPROLOL SUCCINATE 25 MG: 25 TABLET, EXTENDED RELEASE ORAL at 05:10

## 2022-04-13 RX ADMIN — GUAIFENESIN 600 MG: 600 TABLET, EXTENDED RELEASE ORAL at 10:39

## 2022-04-13 NOTE — DISCHARGE PLANNING
Agency/Facility Name: [facility]  Spoke To: Janet   Outcome: DPA confirmed acceptance today with 1200 transport via [employer] van.

## 2022-04-13 NOTE — DISCHARGE SUMMARY
Discharge Summary    CHIEF COMPLAINT ON ADMISSION  Chief Complaint   Patient presents with   • Shortness of Breath     BIBA for SOB and FVO for the past 4 days. States she hasnt' been taking her cardiac meds for the past few months. Given 4 nitro tablets en route       Reason for Admission  Respiratory      CODE STATUS  DNAR/DNI    HPI & HOSPITAL COURSE  This is a 56 y.o. female here with shortness of breath     56-year-old female with history of drug/meth abuse, dilated cardiomyopathy, liver cirrhosis and smoking with noncompliance presented 4/10 with worsening shortness of breath and generalized weakness, denied any fever or chills, no significant chest pain however she has generalized weakness and lower extremity edema, patient noticed redness and swelling on the right leg, no urinary or bowel symptoms on admission labs did not show leukocytosis, creatinine was normal however magnesium was 0.9 troponin around normal 23 and EKG did not show any ischemic changes however showed left bundle branch block, echo is pending, patient received IV Lasix and spironolactone, potassium was replaced. Patient started on oral abx for an open wound on he left toe with cellulitis.     Patient has history of alcoholism, on admission jaundice was noticed, bilirubin was elevated 5.9 with mildly elevated liver enzymes, ultrasound showed signs of cirrhosis. Echo showed EF 15% and cardiology consulted. They recommended restarting patient's cardiac medication, drug cessation, and follow up in the outpatient office. PT/OT evaluated the patient and recommended post acute placement. Palliative care was consulted, POLST completed and patient wanted to be DNR/DNI. Patient's vital signs are stable and she is ready for discharge to SNF. She is to return to the ER if her condition worsens.     Therefore, she is discharged in good and stable condition to skilled nursing facility.    The patient met 2-midnight criteria for an inpatient stay at the  time of discharge.      FOLLOW UP ITEMS POST DISCHARGE  Follow up with primary care   Follow up with cardiology     DISCHARGE DIAGNOSES  Principal Problem:    Acute on chronic systolic heart failure (HCC) POA: Unknown  Active Problems:    Hypokalemia POA: Yes    Liver cirrhosis (HCC) POA: Unknown    Obesity (BMI 30.0-34.9) POA: Unknown    Hypocalcemia POA: Unknown    Methamphetamine abuse (HCC) POA: Unknown    Macrocytosis POA: Unknown    Leg edema POA: Unknown    Dilated cardiomyopathy (HCC) POA: Unknown    History of CVA (cerebrovascular accident) POA: Unknown    Severe protein-calorie malnutrition (HCC) POA: Unknown    Thrombocytopenia (HCC) POA: Unknown    Open wound of fifth toe of right foot POA: Unknown    Noncompliance POA: Unknown  Resolved Problems:    * No resolved hospital problems. *      FOLLOW UP  Future Appointments   Date Time Provider Department Center   4/20/2022  9:00 AM Shaw Yang M.D. [facility] None   4/22/2022 10:30 AM Sravanthi Rayo P.A.-C. [facility]  [address] [facility] [address]  [phone]        primary care physician      Follow up with primary care in 1-2 weeks    Shaw Yang M.D.  [address] [address]  [phone]      Follow up with cardiology       MEDICATIONS ON DISCHARGE     Medication List      START taking these medications      Instructions   acetaminophen 325 MG Tabs  Commonly known as: Tylenol   Take 2 Tablets by mouth every 6 hours as needed.  Dose: 650 mg     amoxicillin-clavulanate 875-125 MG Tabs  Commonly known as: AUGMENTIN   Take 1 Tablet by mouth every 12 hours for 5 days.  Dose: 1 Tablet     aspirin 81 MG EC tablet  Start taking on: April 14, 2022   Take 1 Tablet by mouth every day.  Dose: 81 mg     atorvastatin 20 MG Tabs  Commonly known as: LIPITOR   Take 1 Tablet by mouth every evening.  Dose: 20 mg     budesonide-formoterol 80-4.5 MCG/ACT Aero  Commonly known as: SYMBICORT   Inhale 2 Puffs 2  times a day.  Dose: 2 Puff     doxycycline monohydrate 100 MG tablet  Commonly known as: ADOXA   Take 1 Tablet by mouth every 12 hours for 5 days.  Dose: 100 mg     furosemide 20 MG Tabs  Start taking on: April 14, 2022  Commonly known as: LASIX   Take 1 Tablet by mouth every day.  Dose: 20 mg     guaiFENesin  MG Tb12  Commonly known as: MUCINEX   Take 1 Tablet by mouth every 12 hours for 7 days.  Dose: 600 mg     lidocaine 5 % Ptch  Commonly known as: LIDODERM   Place 1 Patch on the skin every 24 hours as needed (back pain).  Dose: 1 Patch     lisinopril 5 MG Tabs  Start taking on: April 14, 2022  Commonly known as: PRINIVIL   Take 1 Tablet by mouth every day.  Dose: 5 mg     metoprolol SR 25 MG Tb24  Start taking on: April 14, 2022  Commonly known as: TOPROL XL   Take 1 Tablet by mouth every day.  Dose: 25 mg     rivaroxaban 10 MG Tabs tablet  Start taking on: April 14, 2022  Commonly known as: Xarelto   Take 1 Tablet by mouth every day.  Dose: 10 mg     spironolactone 25 MG Tabs  Start taking on: April 14, 2022  Commonly known as: ALDACTONE   Take 1 Tablet by mouth every day.  Dose: 25 mg     tiotropium 2.5 mcg/Act Aers  Start taking on: April 14, 2022  Commonly known as: Spiriva Respimat   Inhale 2 Inhalation every day.  Dose: 5 mcg            Allergies  No Known Allergies    DIET  Orders Placed This Encounter   Procedures   • Diet Order Diet: Cardiac     Standing Status:   Standing     Number of Occurrences:   1     Order Specific Question:   Diet:     Answer:   Cardiac [6]       ACTIVITY  As tolerated.  Weight bearing as tolerated    LINES, DRAINS, AND WOUNDS  This is an automated list. Peripheral IVs will be removed prior to discharge.  Peripheral IV 04/10/22 22 G Left Wrist (Active)   Site Assessment Clean;Dry;Intact 04/12/22 1900   Dressing Type Transparent 04/12/22 1900   Line Status Scrubbed the hub prior to access;Flushed;Saline locked 04/12/22 1900   Dressing Status Clean;Dry;Intact 04/12/22 1900    Dressing Intervention N/A 04/12/22 1900   Date Primary Tubing Changed 04/12/22 04/12/22 2035   Date Secondary Tubing Changed 04/12/22 04/12/22 2035   Infiltration Grading ([facility], CV) 0 04/12/22 2035   Phlebitis Scale ([facility] Only) 0 04/12/22 1900       Peripheral IV 04/10/22 18 G Anterior;Left Forearm (Active)   Site Assessment Clean;Dry;Intact 04/12/22 1900   Dressing Type Transparent 04/12/22 1900   Line Status Scrubbed the hub prior to access;Flushed;Saline locked 04/12/22 1900   Dressing Status Clean;Dry;Intact 04/12/22 1900   Dressing Intervention N/A 04/12/22 1900   Date Primary Tubing Changed 04/12/22 04/12/22 2034   Date Secondary Tubing Changed 04/12/22 04/12/22 2034   Infiltration Grading ([facility], CV) 0 04/12/22 2034   Phlebitis Scale ([facility] Only) 0 04/12/22 1900     External Urinary Catheter (Female Wick) (Active)   Collection Container Suction container 04/12/22 1900   Output (mL) 325 mL 04/12/22 1900      Wound 04/10/22 Partial Thickness Wound Leg Left R 5th Toe (Active)   Wound Image    04/11/22 1200   Site Assessment Red;[place] 04/12/22 1900   Periwound Assessment Hemosiderin Staining;Pink 04/11/22 1200   Margins Attached edges;Defined edges 04/11/22 1200   Closure Open to air 04/12/22 1900   Drainage Amount None 04/11/22 1200   Drainage Description Serosanguineous 04/11/22 0800   Treatments Offloading;Compression 04/12/22 1900   Wound Cleansing Soap and Water 04/11/22 1200   Periwound Protectant Skin Moisturizer 04/11/22 1200   Dressing Cleansing/Solutions Not Applicable 04/11/22 1200   Dressing Options Open to Air 04/11/22 1200   Dressing Changed Observed 04/12/22 1900   Dressing Status Open to Air 04/12/22 1900   Dressing Change/Treatment Frequency Daily, and As Needed 04/12/22 1900   NEXT Dressing Change/Treatment Date 04/12/22 04/11/22 1200   NEXT Weekly Photo (Inpatient Only) 04/18/22 04/11/22 1200   Exposed Structures None 04/11/22 1200   WOUND NURSE ONLY - Time Spent with Patient (mins)  45 04/11/22 1200       Peripheral IV 04/10/22 22 G Left Wrist (Active)   Site Assessment Clean;Dry;Intact 04/12/22 1900   Dressing Type Transparent 04/12/22 1900   Line Status Scrubbed the hub prior to access;Flushed;Saline locked 04/12/22 1900   Dressing Status Clean;Dry;Intact 04/12/22 1900   Dressing Intervention N/A 04/12/22 1900   Date Primary Tubing Changed 04/12/22 04/12/22 2035   Date Secondary Tubing Changed 04/12/22 04/12/22 2035   Infiltration Grading ([facility], CV) 0 04/12/22 2035   Phlebitis Scale ([facility] Only) 0 04/12/22 1900       Peripheral IV 04/10/22 18 G Anterior;Left Forearm (Active)   Site Assessment Clean;Dry;Intact 04/12/22 1900   Dressing Type Transparent 04/12/22 1900   Line Status Scrubbed the hub prior to access;Flushed;Saline locked 04/12/22 1900   Dressing Status Clean;Dry;Intact 04/12/22 1900   Dressing Intervention N/A 04/12/22 1900   Date Primary Tubing Changed 04/12/22 04/12/22 2034   Date Secondary Tubing Changed 04/12/22 04/12/22 2034   Infiltration Grading ([facility], CV) 0 04/12/22 2034   Phlebitis Scale ([facility] Only) 0 04/12/22 1900               MENTAL STATUS ON TRANSFER             CONSULTATIONS  Cardiology   Palliative Care     PROCEDURES  N/A    LABORATORY  Lab Results   Component Value Date    SODIUM 133 (L) 04/13/2022    POTASSIUM 4.2 04/13/2022    CHLORIDE 102 04/13/2022    CO2 23 04/13/2022    GLUCOSE 122 (H) 04/13/2022    BUN 10 04/13/2022    CREATININE 0.53 04/13/2022        Lab Results   Component Value Date    WBC 4.9 04/13/2022    HEMOGLOBIN 12.7 04/13/2022    HEMATOCRIT 38.4 04/13/2022    PLATELETCT 104 (L) 04/13/2022        Total time of the discharge process exceeds 40 minutes.

## 2022-04-13 NOTE — DISCHARGE PLANNING
Anticipated Discharge Disposition:   [facility]     Action:     0945: Spoke with Ganga from [place]. Per Ganga, patient was accepted by Janet to [place]. Patient transporting at 1200. D/C summary pending.     1000: D/C summary in place.     Barriers to Discharge:   None     Plan:   Hospital care management will continue to assist with discharge planning needs.

## 2022-04-13 NOTE — CONSULTS
Brief note for DC planning purposes.  The patient is a 56 y.o. female with a past medical history of polysubstance drug abuse including meth, dilated cardiomyopathy, liver cirrhosis, tobacco abuse;  who presented on 4/10/2022  6:52 AM with shortness of breath and generalized weakness, lower extremity edema.  Work-up was significant for magnesium 0.9, EKG showing left bundle branch block, open wound on left toe with cellulitis.  Patient was admitted for IV Lasix, antibiotics, and echocardiogram which found an ejection fraction of 15%.  Functionally, patient is unable to participate in gait, is requiring max assist for lower body dressing and toileting, and lives on the third floor of a motel with no elevator access, and has no reliable support at home.     Plan  - Patient is not a candidate for inpatient rehab because she is not expected to have a reasonable amount of improvement in a reasonable amount of time using the combined approach.   -Discharge notes reflect patient is excepted at [facility] and set up to transfer at noon today.  I agree with this plan    Rojelio Glass, DO   Physical Medicine and Rehabilitation   4/13/2022

## 2022-04-13 NOTE — PROGRESS NOTES
Monitor Summary  Rhythm: SR with BBB  Rate: 75-98  Ectopy: PVC, Couplet  0.16 / 0.12 / 0.44    Monitor strip reviewed.

## 2022-04-13 NOTE — PROGRESS NOTES
Patient transferred off unit via wheelchair transport. All belonging collected at the direction of the patient, taken with patient. Patient alert and oriented, pleasant and grateful. IV's removed. Discharge instructions provided    WALTER Hay given report via telephone. All questions answered. Pending arrival to facility.

## 2022-04-13 NOTE — PROGRESS NOTES
Assumed care of patient at bedside report from day RN. Updated on POC. Patient currently A & O x 4; on 2 L O2 nasal cannula; up x2 assist; without complaints of acute pain. Assessment completed Call light within reach. Whiteboard updated. Fall precautions in place. Bed locked and in lowest position. All questions answered. No other needs indicated at this time.

## 2022-04-13 NOTE — DOCUMENTATION QUERY
"                                                                         [facility]                                                                       Query Response Note      PATIENT:               VALENTÍN MILAN  ACCT #:                  [id number]  MRN:                     [id number]  :                      1965  ADMIT DATE:       4/10/2022 6:52 AM  DISCH DATE:          RESPONDING  PROVIDER #:        [id number]           QUERY TEXT:    Liver Failure is documented in the Progress Notes on -. Please specify the acuity of this condition.    NOTE:  If the appropriate response is not listed below, please respond with a new note.    The patient's Clinical Indicators include:  Plt: 81, AST: 49, ALT: 19, ALP: 116, and Total Bilirubin: 5.9 on admission. \"Thrombocytopenia d/t liver failure\" and \"Severe PCM- Drug abuse and liver and heart failure\" are documented in the Progress Notes. U/S RUQ Abd noted \"liver is enlarged and heterogenously echogenic. DDx includes hepatic steatosis vs. other diffuse hepatocellular disease.\"    Treatments include: Labs and Imaging.    Risk factors include: dx Cirrhosis, dx A/CSCHF, and hx Alcohol ABuse.    Thank you,  Lei Wilkinson RN, BSN  Clinical   Connect via [employer]  Options provided:   -- Chronic Liver Failure   -- Other explanation, Please specify   -- Unable to Determine      Query created by: Lei Wilkinson on 2022 7:34 AM    RESPONSE TEXT:    Chronic Liver Failure          Electronically signed by:  MIKE ORELLANA MD 2022 12:50 PM              "

## 2022-04-13 NOTE — DISCHARGE INSTRUCTIONS
Discharge Instructions    Discharged to other by ambulance with escort. Discharged via ambulance, hospital escort: Yes.  Special equipment needed: Oxygen    Be sure to schedule a follow-up appointment with your primary care doctor or any specialists as instructed.     Discharge Plan:        I understand that a diet low in cholesterol, fat, and sodium is recommended for good health. Unless I have been given specific instructions below for another diet, I accept this instruction as my diet prescription.   Other diet: Cardiac diet/Low sodium    Special Instructions:   HF Patient Discharge Instructions  · Monitor your weight daily, and maintain a weight chart, to track your weight changes.   · Activity as tolerated, unless your Doctor has ordered otherwise. Other activity order: As tolerated.  · Follow a low fat, low cholesterol, low salt diet unless instructed otherwise by your Doctor. Read the labels on the back of food products and track your intake of fat, cholesterol and salt.   · Fluid Restriction No. If a Fluid Restriction has been ordered by your Doctor, measure fluids with a measuring cup to ensure that you are not exceeding the restriction.   · No smoking.  · Oxygen Yes. If your Doctor has ordered that you wear Oxygen at home, it is important to wear it as ordered.  · Did you receive an explanation from staff on the importance of taking each of your medications and why it is necessary to keep taking them unless your doctor says to stop? Yes  · Were all of your questions answered about how to manage your heart failure and what to do if you have increased signs and symptoms after you go home? Yes  · Do you feel like your heart failure care team involved you in the care treatment plan and allowed you to make decisions regarding your care while in the hospital and addressed any discharge needs you might have? Yes    See the educational handout provided at discharge for more information on monitoring your daily  weight, activity and diet. This also explains more about Heart Failure, symptoms of a flare-up and some of the tests that you have undergone.     Warning Signs of a Flare-Up include:  · Swelling in the ankles or lower legs.  · Shortness of breath, while at rest, or while doing normal activities.   · Shortness of breath at night when in bed, or coughing in bed.   · Requiring more pillows to sleep at night, or needing to sit up at night to sleep.  · Feeling weak, dizzy or fatigued.     When to call your Doctor:  · Call [facility] seven days a week from 8:00 a.m. to 8:00 p.m. for medical questions [phone].  · Call your Primary Care Physician or Cardiologist if:   1. You experience any pain radiating to your jaw or neck.  2. You have any difficulty breathing.  3. You experience weight gain of 3 lbs in a day or 5 lbs in a week.   4. You feel any palpitations or irregular heartbeats.  5. You become dizzy or lose consciousness.   If you have had an angiogram or had a pacemaker or AICD placed, and experience:  1. Bleeding, drainage or swelling at the surgical / puncture site.  2. Fever greater than 100.0 F  3. Shock from internal defibrillator.  4. Cool and / or numb extremities.      · Is patient discharged on Warfarin / Coumadin?   No       Heart Failure Action Plan  A heart failure action plan helps you understand what to do when you have symptoms of heart failure. Follow the plan that was created by you and your health care provider. Review your plan each time you visit your health care provider.  Red zone  These signs and symptoms mean you should get medical help right away:  · You have trouble breathing when resting.  · You have a dry cough that is getting worse.  · You have swelling or pain in your legs or abdomen that is getting worse.  · You suddenly gain more than 2-3 lb (0.9-1.4 kg) in a day, or more than 5 lb (2.3 kg) in one week. This amount may be more or less depending on your  condition.  · You have trouble staying awake or you feel confused.  · You have chest pain.  · You do not have an appetite.  · You pass out.  If you experience any of these symptoms:  · Call your local emergency services (911 in the [place] right away or seek help at the emergency department of the nearest hospital.  Yellow zone  These signs and symptoms mean your condition may be getting worse and you should make some changes:  · You have trouble breathing when you are active or you need to sleep with extra pillows.  · You have swelling in your legs or abdomen.  · You gain 2-3 lb (0.9-1.4 kg) in one day, or 5 lb (2.3 kg) in one week. This amount may be more or less depending on your condition.  · You get tired easily.  · You have trouble sleeping.  · You have a dry cough.  If you experience any of these symptoms:  · Contact your health care provider within the next day.  · Your health care provider may adjust your medicines.  Green zone  These signs mean you are doing well and can continue what you are doing:  · You do not have shortness of breath.  · You have very little swelling or no new swelling.  · Your weight is stable (no gain or loss).  · You have a normal activity level.  · You do not have chest pain or any other new symptoms.  Follow these instructions at home:  · Take over-the-counter and prescription medicines only as told by your health care provider.  · Weigh yourself daily. Your target weight is __________ lb (__________ kg).  ? Call your health care provider if you gain more than __________ lb (__________ kg) in a day, or more than __________ lb (__________ kg) in one week.  · Eat a heart-healthy diet. Work with a diet and nutrition specialist (dietitian) to create an eating plan that is best for you.  · Keep all follow-up visits as told by your health care provider. This is important.  Where to find more information  · American Heart Association: www.heart.org  Summary  · Follow the action plan that  was created by you and your health care provider.  · Get help right away if you have any symptoms in the Red zone.  This information is not intended to replace advice given to you by your health care provider. Make sure you discuss any questions you have with your health care provider.  Document Released: 01/27/2018 Document Revised: 11/30/2018 Document Reviewed: 01/27/2018  [employer] Patient Education © 2020 [employer]    Depression / Suicide Risk    As you are discharged from this [facility] facility, it is important to learn how to keep safe from harming yourself.    Recognize the warning signs:  · Abrupt changes in personality, positive or negative- including increase in energy   · Giving away possessions  · Change in eating patterns- significant weight changes-  positive or negative  · Change in sleeping patterns- unable to sleep or sleeping all the time   · Unwillingness or inability to communicate  · Depression  · Unusual sadness, discouragement and loneliness  · Talk of wanting to die  · Neglect of personal appearance   · Rebelliousness- reckless behavior  · Withdrawal from people/activities they love  · Confusion- inability to concentrate     If you or a loved one observes any of these behaviors or has concerns about self-harm, here's what you can do:  · Talk about it- your feelings and reasons for harming yourself  · Remove any means that you might use to hurt yourself (examples: pills, rope, extension cords, firearm)  · Get professional help from the community (Mental Health, Substance Abuse, psychological counseling)  · Do not be alone:Call your Safe Contact- someone whom you trust who will be there for you.  · Call your local CRISIS HOTLINE 696-3261 or 977-331-9623  · Call your local Children's Mobile Crisis Response Team [place] (566) 975-1275 or www.[employer]  · Call the toll free National Suicide Prevention Hotlines   · National Suicide Prevention Lifeline 778-844-JBNG  (6565)  · [facility] 800-SUICIDE (178-5921)

## 2022-04-13 NOTE — CARE PLAN
The patient is Stable - Low risk of patient condition declining or worsening    Shift Goals  Clinical Goals: Prepare for DC  Patient Goals: Remove IVs  Family Goals: n/a    Progress made toward(s) clinical / shift goals:  Patient ready and pending discharge. Ivs removed in preparation for dc    Patient is not progressing towards the following goals:      Problem: Knowledge Deficit - Standard  Goal: Patient and family/care givers will demonstrate understanding of plan of care, disease process/condition, diagnostic tests and medications  Outcome: Not Progressing

## 2022-04-13 NOTE — HEART FAILURE PROGRAM
Per discharge plan note from this morning, patient will discharge to SNF today at 1200.     I've sent an urgent message to the schedulers requesting that they push out patient's appointment at the HF clinic on 4/20/22. Patient will likely still be at SNF at that time.    Patient refused the pneumococcal vaccine yesterday::        Thanks! Pip [phone] ([employer]ip[employer] on [employer])

## 2022-04-14 LAB
HCV RNA SERPL NAA+PROBE-ACNC: NOT DETECTED IU/ML
HCV RNA SERPL NAA+PROBE-LOG IU: NOT DETECTED LOG IU/ML
HCV RNA SERPL QL NAA+PROBE: NOT DETECTED

## 2022-04-16 ENCOUNTER — APPOINTMENT (OUTPATIENT)
Dept: RADIOLOGY | Facility: MEDICAL CENTER | Age: 57
End: 2022-04-16
Attending: EMERGENCY MEDICINE
Payer: MEDICARE

## 2022-04-16 ENCOUNTER — HOSPITAL ENCOUNTER (EMERGENCY)
Facility: MEDICAL CENTER | Age: 57
End: 2022-04-16
Attending: EMERGENCY MEDICINE
Payer: MEDICARE

## 2022-04-16 VITALS
BODY MASS INDEX: 32.3 KG/M2 | SYSTOLIC BLOOD PRESSURE: 109 MMHG | HEART RATE: 62 BPM | WEIGHT: 201 LBS | OXYGEN SATURATION: 96 % | RESPIRATION RATE: 16 BRPM | DIASTOLIC BLOOD PRESSURE: 60 MMHG | TEMPERATURE: 97.5 F | HEIGHT: 66 IN

## 2022-04-16 DIAGNOSIS — Z79.01 ANTICOAGULATED: ICD-10-CM

## 2022-04-16 DIAGNOSIS — R07.89 CHEST WALL PAIN: ICD-10-CM

## 2022-04-16 DIAGNOSIS — S09.90XA CLOSED HEAD INJURY, INITIAL ENCOUNTER: ICD-10-CM

## 2022-04-16 LAB
ALBUMIN SERPL BCP-MCNC: 2 G/DL (ref 3.2–4.9)
ALBUMIN/GLOB SERPL: 0.5 G/DL
ALP SERPL-CCNC: 119 U/L (ref 30–99)
ALT SERPL-CCNC: 21 U/L (ref 2–50)
ANION GAP SERPL CALC-SCNC: 8 MMOL/L (ref 7–16)
ANISOCYTOSIS BLD QL SMEAR: ABNORMAL
AST SERPL-CCNC: 59 U/L (ref 12–45)
BASOPHILS # BLD AUTO: 1.5 % (ref 0–1.8)
BASOPHILS # BLD: 0.06 K/UL (ref 0–0.12)
BILIRUB SERPL-MCNC: 5.5 MG/DL (ref 0.1–1.5)
BUN SERPL-MCNC: 13 MG/DL (ref 8–22)
CALCIUM SERPL-MCNC: 8.1 MG/DL (ref 8.5–10.5)
CHLORIDE SERPL-SCNC: 103 MMOL/L (ref 96–112)
CO2 SERPL-SCNC: 26 MMOL/L (ref 20–33)
COMMENT 1642: NORMAL
CREAT SERPL-MCNC: 0.61 MG/DL (ref 0.5–1.4)
EOSINOPHIL # BLD AUTO: 0.04 K/UL (ref 0–0.51)
EOSINOPHIL NFR BLD: 1 % (ref 0–6.9)
ERYTHROCYTE [DISTWIDTH] IN BLOOD BY AUTOMATED COUNT: 67.5 FL (ref 35.9–50)
ETHANOL BLD-MCNC: <10.1 MG/DL (ref 0–10)
GFR SERPLBLD CREATININE-BSD FMLA CKD-EPI: 104 ML/MIN/1.73 M 2
GLOBULIN SER CALC-MCNC: 4.1 G/DL (ref 1.9–3.5)
GLUCOSE SERPL-MCNC: 95 MG/DL (ref 65–99)
HCT VFR BLD AUTO: 37.7 % (ref 37–47)
HGB BLD-MCNC: 12.6 G/DL (ref 12–16)
IMM GRANULOCYTES # BLD AUTO: 0.01 K/UL (ref 0–0.11)
IMM GRANULOCYTES NFR BLD AUTO: 0.2 % (ref 0–0.9)
INR PPP: 5.26 (ref 0.87–1.13)
LYMPHOCYTES # BLD AUTO: 0.96 K/UL (ref 1–4.8)
LYMPHOCYTES NFR BLD: 23.8 % (ref 22–41)
MACROCYTES BLD QL SMEAR: ABNORMAL
MCH RBC QN AUTO: 36 PG (ref 27–33)
MCHC RBC AUTO-ENTMCNC: 33.4 G/DL (ref 33.6–35)
MCV RBC AUTO: 107.7 FL (ref 81.4–97.8)
MONOCYTES # BLD AUTO: 0.61 K/UL (ref 0–0.85)
MONOCYTES NFR BLD AUTO: 15.1 % (ref 0–13.4)
MORPHOLOGY BLD-IMP: NORMAL
NEUTROPHILS # BLD AUTO: 2.36 K/UL (ref 2–7.15)
NEUTROPHILS NFR BLD: 58.4 % (ref 44–72)
NRBC # BLD AUTO: 0 K/UL
NRBC BLD-RTO: 0 /100 WBC
PLATELET # BLD AUTO: 122 K/UL (ref 164–446)
PLATELET BLD QL SMEAR: NORMAL
PMV BLD AUTO: 11.7 FL (ref 9–12.9)
POTASSIUM SERPL-SCNC: 4.1 MMOL/L (ref 3.6–5.5)
PROT SERPL-MCNC: 6.1 G/DL (ref 6–8.2)
PROTHROMBIN TIME: 46.7 SEC (ref 12–14.6)
RBC # BLD AUTO: 3.5 M/UL (ref 4.2–5.4)
RBC BLD AUTO: PRESENT
SODIUM SERPL-SCNC: 137 MMOL/L (ref 135–145)
WBC # BLD AUTO: 4 K/UL (ref 4.8–10.8)

## 2022-04-16 PROCEDURE — 85025 COMPLETE CBC W/AUTO DIFF WBC: CPT

## 2022-04-16 PROCEDURE — 36415 COLL VENOUS BLD VENIPUNCTURE: CPT

## 2022-04-16 PROCEDURE — 99284 EMERGENCY DEPT VISIT MOD MDM: CPT

## 2022-04-16 PROCEDURE — 70450 CT HEAD/BRAIN W/O DYE: CPT | Mod: MG

## 2022-04-16 PROCEDURE — 80053 COMPREHEN METABOLIC PANEL: CPT

## 2022-04-16 PROCEDURE — 85610 PROTHROMBIN TIME: CPT

## 2022-04-16 PROCEDURE — G1004 CDSM NDSC: HCPCS

## 2022-04-16 PROCEDURE — 82077 ASSAY SPEC XCP UR&BREATH IA: CPT

## 2022-04-16 PROCEDURE — 73030 X-RAY EXAM OF SHOULDER: CPT | Mod: RT

## 2022-04-16 PROCEDURE — A9270 NON-COVERED ITEM OR SERVICE: HCPCS | Performed by: EMERGENCY MEDICINE

## 2022-04-16 PROCEDURE — 700102 HCHG RX REV CODE 250 W/ 637 OVERRIDE(OP): Performed by: EMERGENCY MEDICINE

## 2022-04-16 RX ORDER — ACETAMINOPHEN 325 MG/1
650 TABLET ORAL ONCE
Status: COMPLETED | OUTPATIENT
Start: 2022-04-16 | End: 2022-04-16

## 2022-04-16 RX ADMIN — ACETAMINOPHEN 650 MG: 325 TABLET, FILM COATED ORAL at 09:05

## 2022-04-16 ASSESSMENT — FIBROSIS 4 INDEX: FIB4 SCORE: 5.45

## 2022-04-16 NOTE — ED NOTES
"Pt discharged to [facility] w/ medical transport. Pt A&Ox4 on RA. IV discontinued and gauze placed, pt in possession of belongings. Pt provided discharge education and information pertaining to medications and follow up appointments. Pt received copy of discharge instructions and verbalized understanding. /60   Pulse 62   Temp 36.4 °C (97.5 °F) (Temporal)   Resp 16   Ht 1.676 m (5' 6\")   Wt 91.2 kg (201 lb)   SpO2 96%   BMI 32.44 kg/m²   "

## 2022-04-16 NOTE — DISCHARGE INSTRUCTIONS
Please follow-up with your primary care clinician for complete recheck within 24 to 48 hours.  You may take Tylenol according to label instructions for chest wall pain.  Return to the emergency department immediately if you develop any new or worsening symptoms including headache, nausea, vomiting, worsening pain to the shoulder or chest wall, or if you have any further concerns.

## 2022-04-16 NOTE — ED TRIAGE NOTES
ER Triage Note      Pt arrives via EMS from [facility] after rolling out of bed this AM. No LOC. Pt on Xarelto. GCS 15.     Hematoma to R eyebrow. Complaining of R sided anterior chest/rib pain that began after fall    Hx stroke with R sided deficits

## 2022-04-16 NOTE — DISCHARGE PLANNING
Anticipated Discharge Disposition:   SNF - [facility]    Action:   RN CM spoke to [facility] that pt needs transportation back to [facility]. RN CM called [facility] spoke to Mariaelena from admissions. Per Mariaelena, pt is from [facility] and has been accepted back, no transport available, [facility] to set up transport.     Per ED RN, pt can travel via wheelchair. RN CM called [facility], transport set up at [phone], with [employer]. Trip number [id number]. Cost $103. ED RN informed about transport time.    DANNY PENA called Mariaelena at [facility] for transport time. No answer, left voicemail for call back.  Addendum: Mariaelena called back, informed about transport time.  COBRA, transfer packet given to ED RN.     Barriers to Discharge:   None.     Plan:   Hospital Care Management will continue to follow and assist with discharge planning needs.

## 2022-04-16 NOTE — ED PROVIDER NOTES
ED Physician Note    Chief Complaint:   Closed head injury    HPI:  Maral Herrera is a very pleasant 56-year-old woman who presents to the emergency department for evaluation of a closed head injury.  She presents as a code TBI activation.  Immediately prior to arrival, she accidentally rolled out of bed.  She is currently staying at a skilled nursing facility due to a history of CVA with residual right-sided deficits.  As she rolled to the right side, she was unable to stop herself due to her right-sided weakness, and was unable to break her fall.  This caused her to land on the right shoulder, right chest wall, and strike the right side of her head.  She is currently anticoagulated on Xarelto.  She developed a hematoma to the right frontal scalp where the injury occurred.  Since that time she has no severe headache, no nausea, no vomiting, and no change in mentation.  She reports no neck pain or neck injury.  Her only concern is some pain localized to the right chest wall which she states developed over the hour following her fall from bed.  She states the pain is worse when taking a deep breath.  She reports no preceding chest pain, no lightheadedness, and no near syncope.  She reports no associated abdominal pain, no upper or lower extremity pain.  She is unable to identify any reliably exacerbating or alleviating factors.  She does remain with some residual right-sided deficits, however states that they are unchanged from her baseline.    Review of Systems:  See HPI for pertinent positives and negatives. All other systems negative.    Past Medical History:   has a past medical history of Brain tumor (HCC) (2012) and Congestive heart failure (HCC).    Social History:  Social History     Tobacco Use   • Smoking status: Current Some Day Smoker   • Smokeless tobacco: Never Used   Vaping Use   • Vaping Use: Never used   Substance and Sexual Activity   • Alcohol use: Yes     Comment: rarely   • Drug use: Not  "Currently     Comment: edibles, meth   • Sexual activity: Not on file       Surgical History:   has a past surgical history that includes craniotomy tumor (2012).    Current Medications:  Home Medications     Reviewed by Meghan Hoover R.N. (Registered Nurse) on 04/16/22 at 0718  Med List Status: <None>   Medication Last Dose Status   acetaminophen (TYLENOL) 325 MG Tab  Active   amoxicillin-clavulanate (AUGMENTIN) 875-125 MG Tab  Active   aspirin EC 81 MG EC tablet  Active   atorvastatin (LIPITOR) 20 MG Tab  Active   budesonide-formoterol (SYMBICORT) 80-4.5 MCG/ACT Aerosol  Active   doxycycline monohydrate (ADOXA) 100 MG tablet  Active   furosemide (LASIX) 20 MG Tab  Active   guaiFENesin ER (MUCINEX) 600 MG TABLET SR 12 HR  Active   lidocaine (LIDODERM) 5 % Patch  Active   lisinopril (PRINIVIL) 5 MG Tab  Active   metoprolol SR (TOPROL XL) 25 MG TABLET SR 24 HR  Active   rivaroxaban (XARELTO) 10 MG Tab tablet  Active   spironolactone (ALDACTONE) 25 MG Tab  Active   tiotropium (SPIRIVA RESPIMAT) 2.5 mcg/Act Aero Soln  Active                Allergies:  No Known Allergies    Physical Exam:  Vital Signs: /60   Pulse 62   Temp 36.4 °C (97.5 °F) (Temporal)   Resp 16   Ht 1.676 m (5' 6\")   Wt 91.2 kg (201 lb)   SpO2 96%   BMI 32.44 kg/m²   Constitutional: Alert, awake, responsive  HENT: Large hematoma to the right forehead with overlying ecchymosis, no lacerations nor abrasions noted  Eyes: Pupils equal and reactive, normal conjunctiva, no gaze deficit  Neck: Supple, normal range of motion, no stridor  Cardiovascular: Extremities are warm and well perfused, no murmur appreciated, normal cardiac auscultation  Pulmonary: No respiratory distress, normal work of breathing, no accessory muscule usage, breath sounds clear and equal bilaterally, right upper anterior chest wall with tenderness to palpation, no flail chest deformity identified  Abdomen: Soft, non-distended, non-tender to palpation, no peritoneal " signs  Skin: Warm, dry, no rashes or lesions  Musculoskeletal: Normal range of motion in all extremities, no swelling or deformity noted  Neurologic: Awake, alert, GCS 15.  No facial droop, no slurred speech, no gaze deficit.  Decreased right upper and right lower extremity strength, unchanged from baseline, 5 out of 5 left upper and left lower extremity strength.  Psychiatric: Normal and appropriate mood and affect    Medical records reviewed for continuity of care.  Discharge summary reviewed from 4/13/2022.  Ms. Herrera is note of a past medical history significant for dilated cardiomyopathy presumed due to methamphetamine abuse, she presented to the emergency department with generalized weakness and shortness of breath.  She is also noted to have a history of alcoholism, echocardiogram showed an ejection fraction of 15% and cardiology was consulted.  At this visit, POLST was completed with patient electing DNR/DNI.    Labs:  Labs Reviewed   CBC WITH DIFFERENTIAL - Abnormal; Notable for the following components:       Result Value    WBC 4.0 (*)     RBC 3.50 (*)     .7 (*)     MCH 36.0 (*)     MCHC 33.4 (*)     RDW 67.5 (*)     Platelet Count 122 (*)     Monocytes 15.10 (*)     Lymphs (Absolute) 0.96 (*)     Anisocytosis 3+ (*)     Macrocytosis 3+ (*)     All other components within normal limits    Narrative:     Indicate which anticoagulants the patient is on:->UNKNOWN  ** xarelto   COMP METABOLIC PANEL - Abnormal; Notable for the following components:    Calcium 8.1 (*)     AST(SGOT) 59 (*)     Alkaline Phosphatase 119 (*)     Total Bilirubin 5.5 (*)     Albumin 2.0 (*)     Globulin 4.1 (*)     All other components within normal limits    Narrative:     Indicate which anticoagulants the patient is on:->UNKNOWN  ** xarelto   PROTHROMBIN TIME - Abnormal; Notable for the following components:    PT 46.7 (*)     INR 5.26 (*)     All other components within normal limits    Narrative:     Indicate which  anticoagulants the patient is on:->UNKNOWN  ** xarelto   DIAGNOSTIC ALCOHOL    Narrative:     Indicate which anticoagulants the patient is on:->UNKNOWN  ** xarelto   ESTIMATED GFR    Narrative:     Indicate which anticoagulants the patient is on:->UNKNOWN  ** xarelto   PERIPHERAL SMEAR REVIEW    Narrative:     Indicate which anticoagulants the patient is on:->UNKNOWN  ** xarelto   PLATELET ESTIMATE    Narrative:     Indicate which anticoagulants the patient is on:->UNKNOWN  ** xarelto   MORPHOLOGY    Narrative:     Indicate which anticoagulants the patient is on:->UNKNOWN  ** xarelto   DIFFERENTIAL COMMENT    Narrative:     Indicate which anticoagulants the patient is on:->UNKNOWN  ** xarelto       Radiology:  DX-SHOULDER 2+ RIGHT   Final Result      No acute fracture or significant arthropathy.      CT-HEAD W/O   Final Result      1.  Prior LEFT frontal craniotomy with high frontal encephalomalacia.   2.  Use atrophy and white matter microvascular ischemic changes versus gliosis.   3.  No acute intracranial hemorrhage or   4.  RIGHT forehead scalp swelling.         CT-CHEST (THORAX) W/O   Final Result      1.  Small bilateral pleural effusions with associated atelectasis, worse on the LEFT.  Post pneumonia is difficult to exclude.   2.  Stable multichamber cardiac enlargement.      Fleischner Society pulmonary nodule recommendations:   Not Applicable              ED Medications Administered:  Medications   acetaminophen (Tylenol) tablet 650 mg (650 mg Oral Given 4/16/22 0905)       Differential diagnosis:  Intracranial bleed, traumatic head injury, rib fractures, pneumothorax, soft tissue injury    MDM:  Ms. Herrera presents to the emergency department today for evaluation after a fall from her bed as a code TBI due to head injury on Xarelto.  On my initial interview, she is also concerned about pain localized to the right chest wall.  I did elect to add a noncontrasted CT of the thorax, out of concern for rib  fractures which may not be readily detected on chest x-ray.  She reports no preceding shortness of breath or chest pain, no suggestion of cardiopulmonary event leading to the fall.  Sounds as though fall from bed was strictly mechanical.    CT head demonstrates no acute intracranial hemorrhage.  Right forehead scalp swelling noted.  Also noted a history of a prior left frontal craniotomy with bifrontal encephalomalacia.  Diffuse atrophy and white matter changes noted, consistent with ischemic changes or gliosis.  CT of the chest demonstrates small bilateral pleural effusions with associated atelectasis, worse on the left.  Multichamber cardiac enlargement noted.  Plain film of the left shoulder demonstrates no acute fracture or dislocation.    On laboratory evaluation bilirubin is 5.5, on review of prior records this is consistent with her prior values calcium is 8.1, also consistent with prior values, no acute change today.  INR is 5.26, also unchanged from prior.  She is on Xarelto, no indication for medication adjustment.  CBC with white blood count of 4.0, consistent with prior values.  Hemoglobin is within normal limits.  Platelet count is 122, actually improved from most recent values.  No evidence of active bleeding, no indication for platelet transfusion.    She remained well-appearing that her stay in the emergency department, still with some mild right-sided chest wall pain.  She remained without nausea, without vomiting, and without neurologic deficit.  No substernal chest pain, no thoracic back pain, no exertional component, pain is reproducible on palpation, and again began after the fall, and is localized to the area that struck the ground.  Doubt cardiac etiology.  At this time, to believe she stable for discharge home.  She is counseled follow-up with her primary care physician, within 24 to 48 hours to review her emergency department visit. Return precautions were discussed with the patient, and  provided in written form with the patient's discharge instructions.     Personal protective equipment including N95 surgical respirator, goggles, and gloves were used during this encounter.       Disposition:  Discharge home in stable condition.    Final Impression:  1. Closed head injury, initial encounter    2. Chest wall pain    3. Anticoagulated        Electronically signed by: Pascale Quezada MD, 4/17/2022 9:56 AM

## 2022-04-27 ENCOUNTER — TELEPHONE (OUTPATIENT)
Dept: CARDIOLOGY | Facility: MEDICAL CENTER | Age: 57
End: 2022-04-27
Payer: MEDICARE

## 2022-05-10 ENCOUNTER — TELEPHONE (OUTPATIENT)
Dept: CARDIOLOGY | Facility: MEDICAL CENTER | Age: 57
End: 2022-05-10
Payer: MEDICARE

## 2022-05-14 ENCOUNTER — HOSPITAL ENCOUNTER (INPATIENT)
Facility: MEDICAL CENTER | Age: 57
LOS: 34 days | DRG: 871 | End: 2022-06-17
Attending: EMERGENCY MEDICINE | Admitting: INTERNAL MEDICINE
Payer: MEDICARE

## 2022-05-14 DIAGNOSIS — D33.0 BENIGN NEOPLASM OF SUPRATENTORIAL REGION OF BRAIN (HCC): ICD-10-CM

## 2022-05-14 DIAGNOSIS — I42.0 DILATED CARDIOMYOPATHY (HCC): ICD-10-CM

## 2022-05-14 DIAGNOSIS — K72.90 LIVER FAILURE WITHOUT HEPATIC COMA, UNSPECIFIED CHRONICITY (HCC): ICD-10-CM

## 2022-05-14 DIAGNOSIS — R78.81 BACTEREMIA: ICD-10-CM

## 2022-05-14 DIAGNOSIS — K74.60 HEPATIC CIRRHOSIS, UNSPECIFIED HEPATIC CIRRHOSIS TYPE, UNSPECIFIED WHETHER ASCITES PRESENT (HCC): ICD-10-CM

## 2022-05-14 DIAGNOSIS — R04.0 EPISTAXIS: ICD-10-CM

## 2022-05-14 DIAGNOSIS — I95.9 HYPOTENSION, UNSPECIFIED HYPOTENSION TYPE: ICD-10-CM

## 2022-05-14 PROBLEM — R94.31 PROLONGED Q-T INTERVAL ON ECG: Status: ACTIVE | Noted: 2022-05-14

## 2022-05-14 LAB
ABO GROUP BLD: NORMAL
ALBUMIN SERPL BCP-MCNC: 2.4 G/DL (ref 3.2–4.9)
ALBUMIN/GLOB SERPL: 0.6 G/DL
ALP SERPL-CCNC: 134 U/L (ref 30–99)
ALT SERPL-CCNC: 24 U/L (ref 2–50)
ANION GAP SERPL CALC-SCNC: 11 MMOL/L (ref 7–16)
ANISOCYTOSIS BLD QL SMEAR: ABNORMAL
AST SERPL-CCNC: 45 U/L (ref 12–45)
BASOPHILS # BLD AUTO: 0.6 % (ref 0–1.8)
BASOPHILS # BLD: 0.04 K/UL (ref 0–0.12)
BILIRUB SERPL-MCNC: 5.8 MG/DL (ref 0.1–1.5)
BLD GP AB SCN SERPL QL: NORMAL
BLOOD CULTURE HOLD CXBCH: NORMAL
BUN SERPL-MCNC: 38 MG/DL (ref 8–22)
CALCIUM SERPL-MCNC: 8.3 MG/DL (ref 8.5–10.5)
CHLORIDE SERPL-SCNC: 98 MMOL/L (ref 96–112)
CO2 SERPL-SCNC: 20 MMOL/L (ref 20–33)
COMMENT 1642: NORMAL
CREAT SERPL-MCNC: 1.34 MG/DL (ref 0.5–1.4)
EKG IMPRESSION: NORMAL
EOSINOPHIL # BLD AUTO: 0.01 K/UL (ref 0–0.51)
EOSINOPHIL NFR BLD: 0.1 % (ref 0–6.9)
ERYTHROCYTE [DISTWIDTH] IN BLOOD BY AUTOMATED COUNT: 65.7 FL (ref 35.9–50)
ETHANOL BLD-MCNC: <10.1 MG/DL
GFR SERPLBLD CREATININE-BSD FMLA CKD-EPI: 46 ML/MIN/1.73 M 2
GLOBULIN SER CALC-MCNC: 3.7 G/DL (ref 1.9–3.5)
GLUCOSE SERPL-MCNC: 137 MG/DL (ref 65–99)
HCT VFR BLD AUTO: 35 % (ref 37–47)
HGB BLD-MCNC: 11.8 G/DL (ref 12–16)
IMM GRANULOCYTES # BLD AUTO: 0.02 K/UL (ref 0–0.11)
IMM GRANULOCYTES NFR BLD AUTO: 0.3 % (ref 0–0.9)
LYMPHOCYTES # BLD AUTO: 0.48 K/UL (ref 1–4.8)
LYMPHOCYTES NFR BLD: 6.8 % (ref 22–41)
MACROCYTES BLD QL SMEAR: ABNORMAL
MAGNESIUM SERPL-MCNC: 1.5 MG/DL (ref 1.5–2.5)
MCH RBC QN AUTO: 35.2 PG (ref 27–33)
MCHC RBC AUTO-ENTMCNC: 33.7 G/DL (ref 33.6–35)
MCV RBC AUTO: 104.5 FL (ref 81.4–97.8)
MONOCYTES # BLD AUTO: 0.81 K/UL (ref 0–0.85)
MONOCYTES NFR BLD AUTO: 11.5 % (ref 0–13.4)
MORPHOLOGY BLD-IMP: NORMAL
NEUTROPHILS # BLD AUTO: 5.66 K/UL (ref 2–7.15)
NEUTROPHILS NFR BLD: 80.7 % (ref 44–72)
NRBC # BLD AUTO: 0 K/UL
NRBC BLD-RTO: 0 /100 WBC
PLATELET # BLD AUTO: 93 K/UL (ref 164–446)
PLATELET BLD QL SMEAR: NORMAL
PMV BLD AUTO: 11.3 FL (ref 9–12.9)
POTASSIUM SERPL-SCNC: 4.7 MMOL/L (ref 3.6–5.5)
PROT SERPL-MCNC: 6.1 G/DL (ref 6–8.2)
RBC # BLD AUTO: 3.35 M/UL (ref 4.2–5.4)
RBC BLD AUTO: PRESENT
RH BLD: NORMAL
SODIUM SERPL-SCNC: 129 MMOL/L (ref 135–145)
WBC # BLD AUTO: 7 K/UL (ref 4.8–10.8)

## 2022-05-14 PROCEDURE — 85610 PROTHROMBIN TIME: CPT

## 2022-05-14 PROCEDURE — 80307 DRUG TEST PRSMV CHEM ANLYZR: CPT

## 2022-05-14 PROCEDURE — 85730 THROMBOPLASTIN TIME PARTIAL: CPT

## 2022-05-14 PROCEDURE — 86901 BLOOD TYPING SEROLOGIC RH(D): CPT

## 2022-05-14 PROCEDURE — 86900 BLOOD TYPING SEROLOGIC ABO: CPT

## 2022-05-14 PROCEDURE — 770000 HCHG ROOM/CARE - INTERMEDIATE ICU *

## 2022-05-14 PROCEDURE — 700102 HCHG RX REV CODE 250 W/ 637 OVERRIDE(OP): Performed by: STUDENT IN AN ORGANIZED HEALTH CARE EDUCATION/TRAINING PROGRAM

## 2022-05-14 PROCEDURE — 700105 HCHG RX REV CODE 258: Performed by: EMERGENCY MEDICINE

## 2022-05-14 PROCEDURE — 36415 COLL VENOUS BLD VENIPUNCTURE: CPT

## 2022-05-14 PROCEDURE — 80053 COMPREHEN METABOLIC PANEL: CPT

## 2022-05-14 PROCEDURE — 87040 BLOOD CULTURE FOR BACTERIA: CPT

## 2022-05-14 PROCEDURE — 99291 CRITICAL CARE FIRST HOUR: CPT | Performed by: STUDENT IN AN ORGANIZED HEALTH CARE EDUCATION/TRAINING PROGRAM

## 2022-05-14 PROCEDURE — 83735 ASSAY OF MAGNESIUM: CPT

## 2022-05-14 PROCEDURE — 99285 EMERGENCY DEPT VISIT HI MDM: CPT

## 2022-05-14 PROCEDURE — 700102 HCHG RX REV CODE 250 W/ 637 OVERRIDE(OP): Performed by: EMERGENCY MEDICINE

## 2022-05-14 PROCEDURE — 85384 FIBRINOGEN ACTIVITY: CPT

## 2022-05-14 PROCEDURE — 85347 COAGULATION TIME ACTIVATED: CPT

## 2022-05-14 PROCEDURE — 86850 RBC ANTIBODY SCREEN: CPT

## 2022-05-14 PROCEDURE — 85576 BLOOD PLATELET AGGREGATION: CPT | Mod: 91

## 2022-05-14 PROCEDURE — 93005 ELECTROCARDIOGRAM TRACING: CPT | Performed by: EMERGENCY MEDICINE

## 2022-05-14 PROCEDURE — A9270 NON-COVERED ITEM OR SERVICE: HCPCS | Performed by: STUDENT IN AN ORGANIZED HEALTH CARE EDUCATION/TRAINING PROGRAM

## 2022-05-14 PROCEDURE — 82077 ASSAY SPEC XCP UR&BREATH IA: CPT

## 2022-05-14 PROCEDURE — A9270 NON-COVERED ITEM OR SERVICE: HCPCS | Performed by: EMERGENCY MEDICINE

## 2022-05-14 PROCEDURE — 85025 COMPLETE CBC W/AUTO DIFF WBC: CPT

## 2022-05-14 RX ORDER — HYDRALAZINE HYDROCHLORIDE 20 MG/ML
10 INJECTION INTRAMUSCULAR; INTRAVENOUS EVERY 4 HOURS PRN
Status: DISCONTINUED | OUTPATIENT
Start: 2022-05-14 | End: 2022-05-20

## 2022-05-14 RX ORDER — LORAZEPAM 2 MG/ML
0.5 INJECTION INTRAMUSCULAR EVERY 4 HOURS PRN
Status: DISCONTINUED | OUTPATIENT
Start: 2022-05-14 | End: 2022-05-20

## 2022-05-14 RX ORDER — GUAIFENESIN 600 MG/1
600 TABLET, EXTENDED RELEASE ORAL 2 TIMES DAILY
Status: ON HOLD | COMMUNITY
End: 2022-06-16

## 2022-05-14 RX ORDER — FLUTICASONE PROPIONATE 50 MCG
1 SPRAY, SUSPENSION (ML) NASAL DAILY
Status: ON HOLD | COMMUNITY
End: 2022-06-16

## 2022-05-14 RX ORDER — OXYMETAZOLINE HYDROCHLORIDE 0.05 G/100ML
2 SPRAY NASAL ONCE
Status: COMPLETED | OUTPATIENT
Start: 2022-05-14 | End: 2022-05-14

## 2022-05-14 RX ORDER — FUROSEMIDE 20 MG/1
20 TABLET ORAL DAILY
Status: DISCONTINUED | OUTPATIENT
Start: 2022-05-15 | End: 2022-06-17 | Stop reason: HOSPADM

## 2022-05-14 RX ORDER — AMOXICILLIN 250 MG
2 CAPSULE ORAL 2 TIMES DAILY
Status: DISCONTINUED | OUTPATIENT
Start: 2022-05-14 | End: 2022-05-20

## 2022-05-14 RX ORDER — OXYCODONE HYDROCHLORIDE 5 MG/1
2.5 TABLET ORAL
Status: DISCONTINUED | OUTPATIENT
Start: 2022-05-14 | End: 2022-05-20

## 2022-05-14 RX ORDER — ACETAMINOPHEN 325 MG/1
650 TABLET ORAL EVERY 6 HOURS PRN
Status: DISCONTINUED | OUTPATIENT
Start: 2022-05-14 | End: 2022-06-17 | Stop reason: HOSPADM

## 2022-05-14 RX ORDER — ATORVASTATIN CALCIUM 20 MG/1
20 TABLET, FILM COATED ORAL EVERY EVENING
Status: DISCONTINUED | OUTPATIENT
Start: 2022-05-15 | End: 2022-06-09

## 2022-05-14 RX ORDER — SPIRONOLACTONE 25 MG/1
25 TABLET ORAL DAILY
Status: DISCONTINUED | OUTPATIENT
Start: 2022-05-15 | End: 2022-06-09

## 2022-05-14 RX ORDER — SODIUM CHLORIDE 9 MG/ML
1000 INJECTION, SOLUTION INTRAVENOUS ONCE
Status: COMPLETED | OUTPATIENT
Start: 2022-05-14 | End: 2022-05-14

## 2022-05-14 RX ORDER — POLYETHYLENE GLYCOL 3350 17 G/17G
1 POWDER, FOR SOLUTION ORAL
Status: DISCONTINUED | OUTPATIENT
Start: 2022-05-14 | End: 2022-05-20

## 2022-05-14 RX ORDER — METOPROLOL SUCCINATE 25 MG/1
25 TABLET, EXTENDED RELEASE ORAL DAILY
Status: DISCONTINUED | OUTPATIENT
Start: 2022-05-15 | End: 2022-06-09

## 2022-05-14 RX ORDER — IBUPROFEN 600 MG/1
600 TABLET ORAL EVERY 6 HOURS PRN
Status: ON HOLD | COMMUNITY
End: 2022-06-16

## 2022-05-14 RX ORDER — LISINOPRIL 2.5 MG/1
5 TABLET ORAL DAILY
Status: DISCONTINUED | OUTPATIENT
Start: 2022-05-15 | End: 2022-06-09

## 2022-05-14 RX ORDER — BUDESONIDE AND FORMOTEROL FUMARATE DIHYDRATE 80; 4.5 UG/1; UG/1
2 AEROSOL RESPIRATORY (INHALATION) 2 TIMES DAILY
Status: DISCONTINUED | OUTPATIENT
Start: 2022-05-14 | End: 2022-05-20

## 2022-05-14 RX ORDER — TRAZODONE HYDROCHLORIDE 50 MG/1
50 TABLET ORAL NIGHTLY
Status: ON HOLD | COMMUNITY
End: 2022-06-16

## 2022-05-14 RX ORDER — LORATADINE 10 MG/1
10 TABLET ORAL DAILY
Status: ON HOLD | COMMUNITY
End: 2022-06-16

## 2022-05-14 RX ORDER — HYDROMORPHONE HYDROCHLORIDE 1 MG/ML
0.25 INJECTION, SOLUTION INTRAMUSCULAR; INTRAVENOUS; SUBCUTANEOUS
Status: DISCONTINUED | OUTPATIENT
Start: 2022-05-14 | End: 2022-05-20

## 2022-05-14 RX ORDER — BISACODYL 10 MG
10 SUPPOSITORY, RECTAL RECTAL
Status: DISCONTINUED | OUTPATIENT
Start: 2022-05-14 | End: 2022-05-20

## 2022-05-14 RX ORDER — OXYCODONE HYDROCHLORIDE 5 MG/1
5 TABLET ORAL
Status: DISCONTINUED | OUTPATIENT
Start: 2022-05-14 | End: 2022-05-20

## 2022-05-14 RX ADMIN — SODIUM CHLORIDE 1000 ML: 9 INJECTION, SOLUTION INTRAVENOUS at 20:54

## 2022-05-14 RX ADMIN — OXYMETAZOLINE HCL 2 SPRAY: 0.05 SPRAY NASAL at 18:45

## 2022-05-14 RX ADMIN — PHENYLEPHRINE HYDROCHLORIDE 1 SPRAY: 1 SPRAY NASAL at 18:45

## 2022-05-14 RX ADMIN — SODIUM CHLORIDE 1000 ML: 9 INJECTION, SOLUTION INTRAVENOUS at 19:20

## 2022-05-14 ASSESSMENT — FIBROSIS 4 INDEX: FIB4 SCORE: 6.02

## 2022-05-15 ENCOUNTER — APPOINTMENT (OUTPATIENT)
Dept: RADIOLOGY | Facility: MEDICAL CENTER | Age: 57
DRG: 871 | End: 2022-05-15
Attending: STUDENT IN AN ORGANIZED HEALTH CARE EDUCATION/TRAINING PROGRAM
Payer: MEDICARE

## 2022-05-15 PROBLEM — E87.1 HYPONATREMIA: Status: ACTIVE | Noted: 2022-05-15

## 2022-05-15 PROBLEM — N30.00 ACUTE CYSTITIS WITHOUT HEMATURIA: Status: ACTIVE | Noted: 2022-05-15

## 2022-05-15 PROBLEM — R79.1 SUPRATHERAPEUTIC INR: Status: ACTIVE | Noted: 2022-05-15

## 2022-05-15 PROBLEM — D33.0 BENIGN NEOPLASM OF SUPRATENTORIAL REGION OF BRAIN (HCC): Status: ACTIVE | Noted: 2022-05-15

## 2022-05-15 PROBLEM — Z71.89 ADVANCE CARE PLANNING: Status: ACTIVE | Noted: 2022-05-15

## 2022-05-15 PROBLEM — D64.9 ANEMIA: Status: ACTIVE | Noted: 2022-05-15

## 2022-05-15 PROBLEM — R57.9 SHOCK (HCC): Status: ACTIVE | Noted: 2022-05-15

## 2022-05-15 LAB
ABO + RH BLD: NORMAL
ALBUMIN SERPL BCP-MCNC: 2.1 G/DL (ref 3.2–4.9)
ALBUMIN/GLOB SERPL: 0.6 G/DL
ALP SERPL-CCNC: 120 U/L (ref 30–99)
ALT SERPL-CCNC: 24 U/L (ref 2–50)
AMPHET UR QL SCN: NEGATIVE
ANION GAP SERPL CALC-SCNC: 10 MMOL/L (ref 7–16)
APPEARANCE UR: ABNORMAL
APTT PPP: 68.8 SEC (ref 24.7–36)
AST SERPL-CCNC: 39 U/L (ref 12–45)
BACTERIA #/AREA URNS HPF: ABNORMAL /HPF
BARBITURATES UR QL SCN: NEGATIVE
BASOPHILS # BLD AUTO: 0.3 % (ref 0–1.8)
BASOPHILS # BLD AUTO: 0.4 % (ref 0–1.8)
BASOPHILS # BLD: 0.02 K/UL (ref 0–0.12)
BASOPHILS # BLD: 0.02 K/UL (ref 0–0.12)
BENZODIAZ UR QL SCN: NEGATIVE
BILIRUB SERPL-MCNC: 4.6 MG/DL (ref 0.1–1.5)
BILIRUB UR QL STRIP.AUTO: NEGATIVE
BUN SERPL-MCNC: 39 MG/DL (ref 8–22)
BZE UR QL SCN: NEGATIVE
CALCIUM SERPL-MCNC: 7.5 MG/DL (ref 8.5–10.5)
CANNABINOIDS UR QL SCN: POSITIVE
CFT BLD TEG: 10.3 MIN (ref 4.6–9.1)
CFT P HPASE BLD TEG: 13.4 MIN (ref 4.3–8.3)
CHLORIDE SERPL-SCNC: 101 MMOL/L (ref 96–112)
CHLORIDE UR-SCNC: 34 MMOL/L
CK SERPL-CCNC: 14 U/L (ref 0–154)
CLOT ANGLE BLD TEG: 72 DEGREES (ref 63–78)
CLOT LYSIS 30M P MA LENFR BLD TEG: 0.3 % (ref 0–2.6)
CO2 SERPL-SCNC: 18 MMOL/L (ref 20–33)
COLOR UR: ABNORMAL
CORTIS SERPL-MCNC: 15.4 UG/DL (ref 0–23)
CORTIS SERPL-MCNC: 16.9 UG/DL (ref 0–23)
CREAT SERPL-MCNC: 1.18 MG/DL (ref 0.5–1.4)
CT.EXTRINSIC BLD ROTEM: 1.3 MIN (ref 0.8–2.1)
EOSINOPHIL # BLD AUTO: 0.03 K/UL (ref 0–0.51)
EOSINOPHIL # BLD AUTO: 0.2 K/UL (ref 0–0.51)
EOSINOPHIL NFR BLD: 0.5 % (ref 0–6.9)
EOSINOPHIL NFR BLD: 2.9 % (ref 0–6.9)
EPI CELLS #/AREA URNS HPF: NEGATIVE /HPF
ERYTHROCYTE [DISTWIDTH] IN BLOOD BY AUTOMATED COUNT: 65 FL (ref 35.9–50)
ERYTHROCYTE [DISTWIDTH] IN BLOOD BY AUTOMATED COUNT: 65.4 FL (ref 35.9–50)
FERRITIN SERPL-MCNC: 499 NG/ML (ref 10–291)
GFR SERPLBLD CREATININE-BSD FMLA CKD-EPI: 54 ML/MIN/1.73 M 2
GLOBULIN SER CALC-MCNC: 3.4 G/DL (ref 1.9–3.5)
GLUCOSE SERPL-MCNC: 111 MG/DL (ref 65–99)
GLUCOSE UR STRIP.AUTO-MCNC: NEGATIVE MG/DL
HCT VFR BLD AUTO: 32.6 % (ref 37–47)
HCT VFR BLD AUTO: 34.7 % (ref 37–47)
HGB BLD-MCNC: 11.2 G/DL (ref 12–16)
HGB BLD-MCNC: 11.9 G/DL (ref 12–16)
HGB RETIC QN AUTO: 37.8 PG/CELL (ref 29–35)
HYALINE CASTS #/AREA URNS LPF: ABNORMAL /LPF
IMM GRANULOCYTES # BLD AUTO: 0.03 K/UL (ref 0–0.11)
IMM GRANULOCYTES # BLD AUTO: 0.04 K/UL (ref 0–0.11)
IMM GRANULOCYTES NFR BLD AUTO: 0.4 % (ref 0–0.9)
IMM GRANULOCYTES NFR BLD AUTO: 0.7 % (ref 0–0.9)
IMM RETICS NFR: 14 % (ref 9.3–17.4)
INR PPP: 9.32 (ref 0.87–1.13)
IRON SATN MFR SERPL: 27 % (ref 15–55)
IRON SERPL-MCNC: 40 UG/DL (ref 40–170)
KETONES UR STRIP.AUTO-MCNC: NEGATIVE MG/DL
LEUKOCYTE ESTERASE UR QL STRIP.AUTO: ABNORMAL
LYMPHOCYTES # BLD AUTO: 0.38 K/UL (ref 1–4.8)
LYMPHOCYTES # BLD AUTO: 0.49 K/UL (ref 1–4.8)
LYMPHOCYTES NFR BLD: 5.6 % (ref 22–41)
LYMPHOCYTES NFR BLD: 8.8 % (ref 22–41)
MCF BLD TEG: 57.4 MM (ref 52–69)
MCF.PLATELET INHIB BLD ROTEM: 21.2 MM (ref 15–32)
MCH RBC QN AUTO: 35.5 PG (ref 27–33)
MCH RBC QN AUTO: 36 PG (ref 27–33)
MCHC RBC AUTO-ENTMCNC: 34.3 G/DL (ref 33.6–35)
MCHC RBC AUTO-ENTMCNC: 34.4 G/DL (ref 33.6–35)
MCV RBC AUTO: 103.6 FL (ref 81.4–97.8)
MCV RBC AUTO: 104.8 FL (ref 81.4–97.8)
METHADONE UR QL SCN: NEGATIVE
MICRO URNS: ABNORMAL
MONOCYTES # BLD AUTO: 0.69 K/UL (ref 0–0.85)
MONOCYTES # BLD AUTO: 0.86 K/UL (ref 0–0.85)
MONOCYTES NFR BLD AUTO: 12.4 % (ref 0–13.4)
MONOCYTES NFR BLD AUTO: 12.7 % (ref 0–13.4)
NEUTROPHILS # BLD AUTO: 4.29 K/UL (ref 2–7.15)
NEUTROPHILS # BLD AUTO: 5.29 K/UL (ref 2–7.15)
NEUTROPHILS NFR BLD: 77.2 % (ref 44–72)
NEUTROPHILS NFR BLD: 78.1 % (ref 44–72)
NITRITE UR QL STRIP.AUTO: POSITIVE
NRBC # BLD AUTO: 0 K/UL
NRBC # BLD AUTO: 0 K/UL
NRBC BLD-RTO: 0 /100 WBC
NRBC BLD-RTO: 0 /100 WBC
OPIATES UR QL SCN: NEGATIVE
OXYCODONE UR QL SCN: NEGATIVE
PA AA BLD-ACNC: 95.1 % (ref 0–11)
PA ADP BLD-ACNC: 88.8 % (ref 0–17)
PCP UR QL SCN: NEGATIVE
PH UR STRIP.AUTO: 5.5 [PH] (ref 5–8)
PLATELET # BLD AUTO: 90 K/UL (ref 164–446)
PLATELET # BLD AUTO: 92 K/UL (ref 164–446)
PMV BLD AUTO: 11 FL (ref 9–12.9)
PMV BLD AUTO: 11.3 FL (ref 9–12.9)
POTASSIUM SERPL-SCNC: 4.5 MMOL/L (ref 3.6–5.5)
POTASSIUM UR-SCNC: 46 MMOL/L
PROCALCITONIN SERPL-MCNC: 2.52 NG/ML
PROCALCITONIN SERPL-MCNC: 2.55 NG/ML
PROPOXYPH UR QL SCN: NEGATIVE
PROT SERPL-MCNC: 5.5 G/DL (ref 6–8.2)
PROT UR QL STRIP: 30 MG/DL
PROTHROMBIN TIME: 72.8 SEC (ref 12–14.6)
RBC # BLD AUTO: 3.11 M/UL (ref 4.2–5.4)
RBC # BLD AUTO: 3.35 M/UL (ref 4.2–5.4)
RBC # URNS HPF: ABNORMAL /HPF
RBC UR QL AUTO: ABNORMAL
RETICS # AUTO: 0.09 M/UL (ref 0.04–0.06)
RETICS/RBC NFR: 2.8 % (ref 0.8–2.1)
SODIUM SERPL-SCNC: 129 MMOL/L (ref 135–145)
SODIUM UR-SCNC: 44 MMOL/L
SP GR UR STRIP.AUTO: 1.02
TEG ALGORITHM TGALG: ABNORMAL
TIBC SERPL-MCNC: 146 UG/DL (ref 250–450)
UIBC SERPL-MCNC: 106 UG/DL (ref 110–370)
UROBILINOGEN UR STRIP.AUTO-MCNC: 1 MG/DL
VIT B12 SERPL-MCNC: 1455 PG/ML (ref 211–911)
WBC # BLD AUTO: 5.6 K/UL (ref 4.8–10.8)
WBC # BLD AUTO: 6.8 K/UL (ref 4.8–10.8)
WBC #/AREA URNS HPF: ABNORMAL /HPF

## 2022-05-15 PROCEDURE — 82728 ASSAY OF FERRITIN: CPT

## 2022-05-15 PROCEDURE — 87186 SC STD MICRODIL/AGAR DIL: CPT | Mod: 91

## 2022-05-15 PROCEDURE — 700105 HCHG RX REV CODE 258: Performed by: STUDENT IN AN ORGANIZED HEALTH CARE EDUCATION/TRAINING PROGRAM

## 2022-05-15 PROCEDURE — 99233 SBSQ HOSP IP/OBS HIGH 50: CPT | Performed by: HOSPITALIST

## 2022-05-15 PROCEDURE — 700111 HCHG RX REV CODE 636 W/ 250 OVERRIDE (IP): Performed by: STUDENT IN AN ORGANIZED HEALTH CARE EDUCATION/TRAINING PROGRAM

## 2022-05-15 PROCEDURE — 85025 COMPLETE CBC W/AUTO DIFF WBC: CPT

## 2022-05-15 PROCEDURE — 82436 ASSAY OF URINE CHLORIDE: CPT

## 2022-05-15 PROCEDURE — 81001 URINALYSIS AUTO W/SCOPE: CPT

## 2022-05-15 PROCEDURE — 83540 ASSAY OF IRON: CPT

## 2022-05-15 PROCEDURE — 83550 IRON BINDING TEST: CPT

## 2022-05-15 PROCEDURE — 82550 ASSAY OF CK (CPK): CPT

## 2022-05-15 PROCEDURE — 700101 HCHG RX REV CODE 250: Performed by: HOSPITALIST

## 2022-05-15 PROCEDURE — 84145 PROCALCITONIN (PCT): CPT

## 2022-05-15 PROCEDURE — 94640 AIRWAY INHALATION TREATMENT: CPT

## 2022-05-15 PROCEDURE — 82533 TOTAL CORTISOL: CPT

## 2022-05-15 PROCEDURE — 87040 BLOOD CULTURE FOR BACTERIA: CPT

## 2022-05-15 PROCEDURE — 82607 VITAMIN B-12: CPT

## 2022-05-15 PROCEDURE — 770020 HCHG ROOM/CARE - TELE (206)

## 2022-05-15 PROCEDURE — A9270 NON-COVERED ITEM OR SERVICE: HCPCS | Performed by: STUDENT IN AN ORGANIZED HEALTH CARE EDUCATION/TRAINING PROGRAM

## 2022-05-15 PROCEDURE — 36415 COLL VENOUS BLD VENIPUNCTURE: CPT

## 2022-05-15 PROCEDURE — 700111 HCHG RX REV CODE 636 W/ 250 OVERRIDE (IP): Performed by: HOSPITALIST

## 2022-05-15 PROCEDURE — 700102 HCHG RX REV CODE 250 W/ 637 OVERRIDE(OP): Performed by: STUDENT IN AN ORGANIZED HEALTH CARE EDUCATION/TRAINING PROGRAM

## 2022-05-15 PROCEDURE — 87077 CULTURE AEROBIC IDENTIFY: CPT | Mod: 91

## 2022-05-15 PROCEDURE — 71045 X-RAY EXAM CHEST 1 VIEW: CPT

## 2022-05-15 PROCEDURE — 84300 ASSAY OF URINE SODIUM: CPT

## 2022-05-15 PROCEDURE — 93005 ELECTROCARDIOGRAM TRACING: CPT | Performed by: HOSPITALIST

## 2022-05-15 PROCEDURE — 87086 URINE CULTURE/COLONY COUNT: CPT

## 2022-05-15 PROCEDURE — 84133 ASSAY OF URINE POTASSIUM: CPT

## 2022-05-15 PROCEDURE — 85046 RETICYTE/HGB CONCENTRATE: CPT

## 2022-05-15 RX ORDER — ECHINACEA PURPUREA EXTRACT 125 MG
2 TABLET ORAL
Status: DISCONTINUED | OUTPATIENT
Start: 2022-05-15 | End: 2022-06-17 | Stop reason: HOSPADM

## 2022-05-15 RX ORDER — OXYMETAZOLINE HYDROCHLORIDE 0.05 G/100ML
2 SPRAY NASAL 2 TIMES DAILY
Status: DISPENSED | OUTPATIENT
Start: 2022-05-15 | End: 2022-05-18

## 2022-05-15 RX ADMIN — BUDESONIDE AND FORMOTEROL FUMARATE DIHYDRATE 2 PUFF: 80; 4.5 AEROSOL RESPIRATORY (INHALATION) at 17:06

## 2022-05-15 RX ADMIN — BUDESONIDE AND FORMOTEROL FUMARATE DIHYDRATE 2 PUFF: 80; 4.5 AEROSOL RESPIRATORY (INHALATION) at 00:40

## 2022-05-15 RX ADMIN — OXYCODONE 5 MG: 5 TABLET ORAL at 21:54

## 2022-05-15 RX ADMIN — PIPERACILLIN AND TAZOBACTAM 3.38 G: 3; .375 INJECTION, POWDER, LYOPHILIZED, FOR SOLUTION INTRAVENOUS; PARENTERAL at 06:54

## 2022-05-15 RX ADMIN — ATORVASTATIN CALCIUM 20 MG: 20 TABLET, FILM COATED ORAL at 17:06

## 2022-05-15 RX ADMIN — CEFTRIAXONE SODIUM 1 G: 10 INJECTION, POWDER, FOR SOLUTION INTRAVENOUS at 11:52

## 2022-05-15 RX ADMIN — PROTHROMBIN, COAGULATION FACTOR VII HUMAN, COAGULATION FACTOR IX HUMAN, COAGULATION FACTOR X HUMAN, PROTEIN C, PROTEIN S HUMAN, AND WATER 2000 UNITS: KIT at 01:57

## 2022-05-15 RX ADMIN — OXYMETAZOLINE HCL 2 SPRAY: 0.05 SPRAY NASAL at 17:06

## 2022-05-15 RX ADMIN — BUDESONIDE AND FORMOTEROL FUMARATE DIHYDRATE 2 PUFF: 80; 4.5 AEROSOL RESPIRATORY (INHALATION) at 06:52

## 2022-05-15 RX ADMIN — TIOTROPIUM BROMIDE INHALATION SPRAY 5 MCG: 3.12 SPRAY, METERED RESPIRATORY (INHALATION) at 06:52

## 2022-05-15 RX ADMIN — OXYCODONE 5 MG: 5 TABLET ORAL at 10:59

## 2022-05-15 ASSESSMENT — FIBROSIS 4 INDEX
FIB4 SCORE: 4.93
FIB4 SCORE: 5.04

## 2022-05-15 ASSESSMENT — ENCOUNTER SYMPTOMS
DEPRESSION: 0
SHORTNESS OF BREATH: 0
CHILLS: 0
HEADACHES: 0
SORE THROAT: 0
FEVER: 1
POLYDIPSIA: 0
ABDOMINAL PAIN: 0
RESPIRATORY NEGATIVE: 1
NAUSEA: 0
BRUISES/BLEEDS EASILY: 0
VOMITING: 0
COUGH: 0
GASTROINTESTINAL NEGATIVE: 1
NERVOUS/ANXIOUS: 1
CARDIOVASCULAR NEGATIVE: 1
BACK PAIN: 0
BLURRED VISION: 0
NECK PAIN: 0
INSOMNIA: 0
FOCAL WEAKNESS: 1
EYES NEGATIVE: 1
WEAKNESS: 0
WHEEZING: 0
CONSTIPATION: 0
DIZZINESS: 0
HEADACHES: 1
PALPITATIONS: 0
LOSS OF CONSCIOUSNESS: 0
NAUSEA: 1
MYALGIAS: 1
HEARTBURN: 0
FEVER: 0
VOMITING: 1
BLOOD IN STOOL: 0
DOUBLE VISION: 0
DIARRHEA: 0
FOCAL WEAKNESS: 0

## 2022-05-15 ASSESSMENT — COGNITIVE AND FUNCTIONAL STATUS - GENERAL
SUGGESTED CMS G CODE MODIFIER MOBILITY: CL
PERSONAL GROOMING: A LITTLE
SUGGESTED CMS G CODE MODIFIER DAILY ACTIVITY: CK
STANDING UP FROM CHAIR USING ARMS: A LOT
DRESSING REGULAR LOWER BODY CLOTHING: A LOT
TURNING FROM BACK TO SIDE WHILE IN FLAT BAD: A LOT
EATING MEALS: A LITTLE
CLIMB 3 TO 5 STEPS WITH RAILING: A LOT
DRESSING REGULAR UPPER BODY CLOTHING: A LOT
MOVING TO AND FROM BED TO CHAIR: A LOT
MOBILITY SCORE: 12
TOILETING: A LOT
MOVING FROM LYING ON BACK TO SITTING ON SIDE OF FLAT BED: A LOT
HELP NEEDED FOR BATHING: A LOT
WALKING IN HOSPITAL ROOM: A LOT
DAILY ACTIVITIY SCORE: 14

## 2022-05-15 ASSESSMENT — PAIN DESCRIPTION - PAIN TYPE
TYPE: ACUTE PAIN

## 2022-05-15 ASSESSMENT — PAIN SCALES - WONG BAKER: WONGBAKER_NUMERICALRESPONSE: HURTS JUST A LITTLE BIT

## 2022-05-15 ASSESSMENT — PATIENT HEALTH QUESTIONNAIRE - PHQ9
SUM OF ALL RESPONSES TO PHQ9 QUESTIONS 1 AND 2: 0
1. LITTLE INTEREST OR PLEASURE IN DOING THINGS: NOT AT ALL
2. FEELING DOWN, DEPRESSED, IRRITABLE, OR HOPELESS: NOT AT ALL

## 2022-05-15 NOTE — ASSESSMENT & PLAN NOTE
Resolved.  Secondary to E. coli ESBL bacteremia and UTI.  Now on comfort care, pending placement for hospice

## 2022-05-15 NOTE — ED NOTES
Med Rec complete per Pt's MAR from [facility].  Allergies reviewed per MAR.

## 2022-05-15 NOTE — ED PROVIDER NOTES
ED Physician Note    Chief Concern:   Epistaxis, hypotension    HPI:  Maral Herrera is a very pleasant 56-year-old woman who presents to the emergency department for evaluation of epistaxis and hypotension.  She states that she developed a nosebleed yesterday while she was at her skilled nursing facility.  She says that she spent the night sleeping with Kleenex and stuffed into her nose, but she was saturating them all night.  On arrival to the emergency department her epistaxis has stopped, she is currently hemostatic with no active bleeding.  However she is hypotensive with systolic blood pressure 80/50.  She is mentating well, states that she does not feel significantly lightheaded but does feel very fatigued.  She states she typically has laboratory abnormalities when I talked about checking blood work.  She has no associated headache, no severe neck or back pain.  She did have a few episodes of vomiting, which she states was from swallowing blood, she describes vomiting blood but was definitely swallowing blood from her epistaxis.  She is unable to identify any reliably exacerbating or alleviating factors.  She is currently on Xarelto, no other abnormal bleeding or bruising reported.    Review of Systems:  See HPI for pertinent positives and negatives. All other systems negative.    Past Medical History:   has a past medical history of Brain tumor (HCC) (2012), Cirrhosis (HCC), Congestive heart failure (HCC), and Thrombocytopenia (HCC).    Social History:  Social History     Tobacco Use   • Smoking status: Current Some Day Smoker   • Smokeless tobacco: Never Used   Vaping Use   • Vaping Use: Never used   Substance and Sexual Activity   • Alcohol use: Yes     Comment: rarely   • Drug use: Not Currently     Comment: edibles, meth   • Sexual activity: Not on file       Surgical History:   has a past surgical history that includes craniotomy tumor (2012).    Current Medications:  Home Medications     Reviewed by  Vida Fitch R.N. (Registered Nurse) on 05/14/22 at 1737  Med List Status: <None>   Medication Last Dose Status   acetaminophen (TYLENOL) 325 MG Tab  Active   aspirin EC 81 MG EC tablet  Active   atorvastatin (LIPITOR) 20 MG Tab  Active   budesonide-formoterol (SYMBICORT) 80-4.5 MCG/ACT Aerosol  Active   furosemide (LASIX) 20 MG Tab  Active   lidocaine (LIDODERM) 5 % Patch  Active   lisinopril (PRINIVIL) 5 MG Tab  Active   metoprolol SR (TOPROL XL) 25 MG TABLET SR 24 HR  Active   rivaroxaban (XARELTO) 10 MG Tab tablet  Active   spironolactone (ALDACTONE) 25 MG Tab  Active   tiotropium (SPIRIVA RESPIMAT) 2.5 mcg/Act Aero Soln  Active                Allergies:  No Known Allergies    Physical Exam:  Vital Signs: BP (!) 77/49   Pulse 83   Temp 36.7 °C (98 °F) (Temporal)   Resp 17   Wt 91.2 kg (201 lb)   SpO2 94%   BMI 32.44 kg/m²   Constitutional: Alert, no acute distress  HENT: Normocephalic, mask in place dried blood in bilateral nares, no active bleeding  Eyes: Pale conjunctiva  Neck: Supple, normal range of motion, no stridor  Cardiovascular: Extremities are warm and well perfused, no murmur appreciated, normal cardiac auscultation, mild tachycardia with heart rate of 100  Pulmonary: No respiratory distress, normal work of breathing, no accessory muscule usage, breath sounds clear and equal bilaterally, no wheezing, no coarse breath sounds  Abdomen: Soft, non-distended  Skin: Warm, dry, no rashes or lesions  Musculoskeletal: No swelling or deformity noted  Neurologic: Alert, oriented, normal speech, normal motor function  Psychiatric: Normal and appropriate mood and affect    Medical records reviewed for continuity of care.  No recent visits for similar symptoms.  Most recent visit to this facility was 4/16/2022.  Emergency physician record reviewed.  She was brought in as a code TBI, noted to be currently anticoagulated on Xarelto.  No intracranial injury noted on head CT.    EKG: Pending at time of  admission    Labs:  Labs Reviewed   CBC WITH DIFFERENTIAL - Abnormal; Notable for the following components:       Result Value    RBC 3.35 (*)     Hemoglobin 11.8 (*)     Hematocrit 35.0 (*)     .5 (*)     MCH 35.2 (*)     RDW 65.7 (*)     Platelet Count 93 (*)     Neutrophils-Polys 80.70 (*)     Lymphocytes 6.80 (*)     Lymphs (Absolute) 0.48 (*)     Anisocytosis 2+ (*)     Macrocytosis 2+ (*)     All other components within normal limits   COMP METABOLIC PANEL - Abnormal; Notable for the following components:    Sodium 129 (*)     Glucose 137 (*)     Bun 38 (*)     Calcium 8.3 (*)     Alkaline Phosphatase 134 (*)     Total Bilirubin 5.8 (*)     Albumin 2.4 (*)     Globulin 3.7 (*)     All other components within normal limits   ESTIMATED GFR - Abnormal; Notable for the following components:    GFR (CKD-EPI) 46 (*)     All other components within normal limits   COD (ADULT)   BLOOD CULTURE,HOLD   PERIPHERAL SMEAR REVIEW   PLATELET ESTIMATE   MORPHOLOGY   DIFFERENTIAL COMMENT   ABO RH CONFIRM       Radiology:  No orders to display        ED Medications Administered:  Medications   oxymetazoline (AFRIN) 0.05 % nasal spray 2 Spray (2 Sprays Nasal Given by Provider 5/14/22 1845)   phenylephrine (NEOSYNEPHRINE) 1 % nasal spray 1 Spray (1 Spray Nasal Given by Provider 5/14/22 1845)   NS (BOLUS) infusion 1,000 mL (0 mL Intravenous Stopped 5/14/22 2054)   NS (BOLUS) infusion 1,000 mL (1,000 mL Intravenous New Bag 5/14/22 2054)       Differential diagnosis:  Acute blood loss leading to hypotension and anemia, anticoagulation side effect, epistaxis, hypovolemia    MDM:  Ms. Herrera presents the emergency department today for evaluation of epistaxis, she reports bleeding that has gone on throughout the evening, and into today, though has resolved without intervention on arrival to the emergency department.  She is found to be hypotensive with blood pressure of 80/50, given history of bleeding and concern for acute  blood loss and resultant anemia.    On laboratory evaluation hemoglobin today is 11.8, not significantly changed from 12.6 most recently.  Platelet count is 93, down from 122 previously.  She has a normal white blood count.  Chemistry notable for hyponatremia with sodium of 129, she has previously had sodium values in the low 130s.  Creatinine is 1.34 which is nearly double her most recent value, though still within normal limits.  Bilirubin is elevated which appears to be chronic, alkaline phosphatase is elevated which appears to be chronic.    IV fluid bolus given out of concern for hypotension and tachycardia.    On reassessment after receiving 2 L of IV fluids, systolic blood pressure has improved to 85.  I am concerned that she may have experienced some significant blood loss over the past several hours, though epistaxis has been well controlled in the emergency department.  Additionally, she appears to have some renal insufficiency, though creatinine is not significantly elevated.  Plan is for admission for continued IV fluid resuscitation, as well as serial H&H's, and blood pressure monitoring.  She is not having any active bleeding, so any further decrease in H&H is likely due to dilution, unless bleeding recurs while hospitalized.    Case discussed with hospitalist to request admission to IM.    Critical Care  I spent 35 minutes of critical care time with this patient. This does not include time spent on separately reported billable procedures.   This includes pulse ox interpretation, medical record review when available, interpretation of labs and imaging, specialist consultation, and hemodynamic monitoring.      Personal protective equipment including N95 surgical respirator, goggles, and gloves were used during this encounter.       Disposition:  Admit to [facility] in critical condition.    Final Impression:  1. Epistaxis    2. Hypotension, unspecified hypotension type        Electronically signed by: Pascale  Damien RAYMOND, 5/14/2022 10:14 PM

## 2022-05-15 NOTE — CARE PLAN
The patient is Watcher - Medium risk of patient condition declining or worsening         Progress made toward(s) clinical / shift goals:    Problem: Knowledge Deficit - Standard  Goal: Patient and family/care givers will demonstrate understanding of plan of care, disease process/condition, diagnostic tests and medications  Outcome: Progressing     Problem: Pain - Standard  Goal: Alleviation of pain or a reduction in pain to the patient’s comfort goal  Outcome: Progressing     Problem: Skin Integrity  Goal: Skin integrity is maintained or improved  Outcome: Progressing     Problem: Fall Risk  Goal: Patient will remain free from falls  Outcome: Progressing

## 2022-05-15 NOTE — H&P
Hospital Medicine History & Physical Note    Date of Service  5/14/22    Primary Care Physician  Pcp Pt States None    Consultants  Intensivist: Jak Herndon MD    Code Status  DNAR/DNI    Chief Complaint  Chief Complaint   Patient presents with   • Epistaxis     Pt sent from [facility] for evaluation of epistaxis        History of Presenting Illness  Maral Herrera is a 56 y.o. female who presented 5/14/2022 with complaints of significant severe epistaxis for the past 3 days.  Of note patient states that she had a ground-level fall at her rehabilitation facility about 1 month ago and had significant periorbital swelling on right, maxillary and sphenoid all ecchymosis.  She states she has been having some nausea and vomiting secondary to postnasal drip of epistaxis and states she has had esophageal variceal bleed in the past which feels completely different than current episode of bleeding.  She does have a history of dilated cardiomyopathy likely secondary to alcohol/drug abuse.  She had echocardiogram performed April 2022 with ejection fraction 15%.  She also is on Xarelto for history of blood clots for bedrest.  She denies any current EtOH use however states she has cirrhosis secondary to longstanding alcohol history.  She admits to being vaccine against COVID-19 x1 and denies any signs or symptoms of infection including anosmia, ageusia, fevers, chills, diarrhea, myalgias, shortness of breath with cough or sputum production.  She states she is in overall poor health and does not wish for aggressive resuscitative measures and wishes for DNR/DNI at time of evaluation and alert and oriented x4.  She was administered intranasal Afrin in ED with some resolution of nasal bleed.  She states she usually packs tissues up her nose and informed to contact nursing staff if her bleed continues.    Vital signs in ER were as follows: 98.0, 100, 16, 80/50, 93% room air.  She was noted to become more hypotensive at  58/35 requiring fluid resuscitation with some response.  She was given 2 L in ED by ERP.    CBC was performed and reveals mild macrocytic anemia and thrombocytopenia of 93,000 otherwise no significant signs of infection.  Mild to moderate hyponatremia appreciated and elevated total bilirubin which is chronic for her appreciated on CMP.  Diagnostic alcohol level less than 10.1.  PT elevated at 72.8 seconds and INR 9.32 and APTT elevated at 68.8 on Xarelto.  She will be subsequently reversed with Kcentra to be given stat for her active epistaxis episode.  Repeat CBC revealed minimal blood loss with hemoglobin from 11.8 to now 11.2.  COD has been ordered and pending.    Hospitalist consulted for admission.  Hospitalist has consulted subsequently with intensivist for IMCU admission and agreeable for IMCU.  Patient agreeable to inpatient hospitalization and wishes for DO NOT RESUSCITATE DO NOT INTUBATE CODE STATUS at time of evaluation and alert and oriented x4.  She agrees to admission for severe epistaxis, shock and supratherapeutic INR.  She will be optimized in ER and subsequently transferred to IMCU for further optimization medical management.      I discussed the plan of care with patient.    Review of Systems  Review of Systems   Constitutional: Negative for chills and fever.   HENT: Positive for nosebleeds. Negative for congestion and sore throat.    Eyes: Negative for blurred vision and double vision.   Respiratory: Negative for cough, shortness of breath and wheezing.    Cardiovascular: Negative for chest pain and palpitations.   Gastrointestinal: Positive for nausea and vomiting. Negative for abdominal pain, blood in stool, constipation, diarrhea, heartburn and melena.   Genitourinary: Negative for dysuria and frequency.   Musculoskeletal: Negative for back pain and neck pain.   Skin: Negative for itching and rash.   Neurological: Negative for focal weakness, loss of consciousness, weakness and headaches.    Endo/Heme/Allergies: Negative for environmental allergies. Does not bruise/bleed easily.   Psychiatric/Behavioral: Negative for depression. The patient does not have insomnia.        Past Medical History   has a past medical history of Brain tumor (HCC) (2012), Cirrhosis (HCC), Congestive heart failure (HCC), and Thrombocytopenia (HCC).    Surgical History   has a past surgical history that includes craniotomy tumor (2012).     Family History  family history includes Diabetes in her mother; Heart Disease in her father and mother.   Family history reviewed with patient. There is no family history that is pertinent to the chief complaint.     Social History   reports that she has quit smoking. She has never used smokeless tobacco. She reports current alcohol use. She reports current drug use.    Allergies  No Known Allergies    Medications  Prior to Admission Medications   Prescriptions Last Dose Informant Patient Reported? Taking?   acetaminophen (TYLENOL) 325 MG Tab 5/8/2022 at 1600 MAR from Other Facility No No   Sig: Take 2 Tablets by mouth every 6 hours as needed.   aspirin EC 81 MG EC tablet 5/14/2022 at 0800 MAR from Other Facility No No   Sig: Take 1 Tablet by mouth every day.   atorvastatin (LIPITOR) 20 MG Tab 5/13/2022 at 2000 MAR from Other Facility No No   Sig: Take 1 Tablet by mouth every evening.   budesonide-formoterol (SYMBICORT) 80-4.5 MCG/ACT Aerosol 5/14/2022 at 0800 MAR from Other Facility No No   Sig: Inhale 2 Puffs 2 times a day.   fluticasone (FLONASE) 50 MCG/ACT nasal spray 5/13/2022 at 2000 MAR from Other Facility Yes Yes   Sig: Administer 1 Spray into affected nostril(S) every day.   furosemide (LASIX) 20 MG Tab 5/14/2022 at 0800 MAR from Other Facility No No   Sig: Take 1 Tablet by mouth every day.   guaiFENesin ER (MUCINEX) 600 MG TABLET SR 12 HR 5/14/2022 at 0800 MAR from Other Facility Yes Yes   Sig: Take 600 mg by mouth 2 times a day.   ibuprofen (MOTRIN) 600 MG Tab 5/14/2022 at 1200  MAR from Other Facility Yes Yes   Sig: Take 600 mg by mouth as needed in the morning and 600 mg as needed at noon and 600 mg as needed in the evening and 600 mg as needed before bedtime.   lidocaine (LIDODERM) 5 % Patch 5/14/2022 at 0800 MAR from Other Facility No No   Sig: Place 1 Patch on the skin every 24 hours as needed (back pain).   lisinopril (PRINIVIL) 5 MG Tab 5/14/2022 at 0800 MAR from Other Facility No No   Sig: Take 1 Tablet by mouth every day.   loratadine (CLARITIN) 10 MG Tab 5/13/2022 at 2000 MAR from Other Facility Yes Yes   Sig: Take 10 mg by mouth every day.   metoprolol SR (TOPROL XL) 25 MG TABLET SR 24 HR 5/14/2022 at 0800 MAR from Other Facility No No   Sig: Take 1 Tablet by mouth every day.   rivaroxaban (XARELTO) 10 MG Tab tablet 5/14/2022 at 0800 MAR from Other Facility No No   Sig: Take 1 Tablet by mouth every day.   spironolactone (ALDACTONE) 25 MG Tab 5/14/2022 at 0800 MAR from Other Facility No No   Sig: Take 1 Tablet by mouth every day.   tiotropium (SPIRIVA RESPIMAT) 2.5 mcg/Act Aero Soln 5/14/2022 at 0800 MAR from Other Facility No No   Sig: Inhale 2 Inhalation every day.   traZODone (DESYREL) 50 MG Tab 5/13/2022 at 2000 MAR from Other Facility Yes Yes   Sig: Take 50 mg by mouth every evening.      Facility-Administered Medications: None       Physical Exam  Temp:  [36.7 °C (98 °F)] 36.7 °C (98 °F)  Pulse:  [] 96  Resp:  [12-28] 22  BP: (58-98)/(35-65) 79/53  SpO2:  [89 %-94 %] 89 %  Blood Pressure: (!) 83/52   Temperature: 36.7 °C (98 °F)   Pulse: 96   Respiration: (!) 21   Pulse Oximetry: 90 %       Physical Exam  Constitutional:       Comments: Cachectic, pleasant cooperative, appears age greater than stated, unkempt   HENT:      Head:      Comments: Maxillary ecchymosis on right with sphenoidal ecchymosis appreciated and nodular well by her right eyebrow from fall greater than 1 month ago     Nose:      Comments: Dried epistaxis appreciated around left nostril      Mouth/Throat:      Mouth: Mucous membranes are dry.      Pharynx: Oropharynx is clear. No posterior oropharyngeal erythema.      Comments: Upper dentures appreciated, lower gingival infection appreciated  Eyes:      General: Scleral icterus present.      Extraocular Movements: Extraocular movements intact.      Pupils: Pupils are equal, round, and reactive to light.      Comments: Pale conjunctiva   Neck:      Vascular: No carotid bruit.   Cardiovascular:      Rate and Rhythm: Normal rate and regular rhythm.      Pulses: Normal pulses.      Heart sounds: Murmur heard.     No friction rub. No gallop.   Pulmonary:      Effort: Pulmonary effort is normal.      Breath sounds: Normal breath sounds. No wheezing, rhonchi or rales.   Abdominal:      General: Abdomen is flat. Bowel sounds are normal. There is no distension.      Palpations: There is no mass.      Tenderness: There is no abdominal tenderness. There is no rebound.   Musculoskeletal:         General: No swelling. Normal range of motion.      Cervical back: Normal range of motion.      Right lower leg: No edema.      Left lower leg: No edema.   Lymphadenopathy:      Cervical: No cervical adenopathy.   Skin:     General: Skin is warm and dry.      Capillary Refill: Capillary refill takes less than 2 seconds.      Coloration: Skin is jaundiced.      Findings: No erythema or rash.   Neurological:      General: No focal deficit present.      Mental Status: She is alert and oriented to person, place, and time. Mental status is at baseline.      Cranial Nerves: No cranial nerve deficit.      Sensory: No sensory deficit.      Motor: No weakness.   Psychiatric:         Mood and Affect: Mood normal.         Behavior: Behavior normal.         Laboratory:  Recent Labs     05/14/22  1800 05/14/22  2345   WBC 7.0 5.6   RBC 3.35* 3.11*   HEMOGLOBIN 11.8* 11.2*   HEMATOCRIT 35.0* 32.6*   .5* 104.8*   MCH 35.2* 36.0*   MCHC 33.7 34.4   RDW 65.7* 65.4*   PLATELETCT 93*  92*   MPV 11.3 11.3     Recent Labs     05/14/22  1752 05/14/22  2345   SODIUM 129* 129*   POTASSIUM 4.7 4.5   CHLORIDE 98 101   CO2 20 18*   GLUCOSE 137* 111*   BUN 38* 39*   CREATININE 1.34 1.18   CALCIUM 8.3* 7.5*     Recent Labs     05/14/22  1752 05/14/22  2345   ALTSGPT 24 24   ASTSGOT 45 39   ALKPHOSPHAT 134* 120*   TBILIRUBIN 5.8* 4.6*   GLUCOSE 137* 111*     Recent Labs     05/14/22  1852   APTT 68.8*   INR 9.32*     No results for input(s): NTPROBNP in the last 72 hours.      No results for input(s): TROPONINT in the last 72 hours.    I reviewed and interpreted the above twelve-lead EKG and do appreciate chronic left bundle branch block with new lateral ischemic changes with T wave inversions without ST segment elevations or depressions as seen above.  QTc prolonged and poor R wave progression in precordial leads.    Imaging:  No orders to display     Chest x-ray has been ordered and pending      Assessment/Plan:  Justification for Admission Status  I anticipate this patient will require at least two midnights for appropriate medical management, necessitating inpatient admission because Patient meets inpatient criteria    * Epistaxis- (present on admission)  Assessment & Plan  Significant supratherapeutic INR appreciated on labs and we will hold Xarelto and administer Kcentra  Recommend daytime hospitalist to consult with ENT in a.m.  Consider nasal packing/Rhino Rocket if bleeding reinitiates  Coagulation studies as above    Shock (HCC)- (present on admission)  Assessment & Plan  Fluid responsive shock in ED with 2 L of crystalloid fluid with some improvement in soft blood pressures still.  If remains to be hypotensive we will avoid further fluid resuscitation if she is not intravascularly depleted and provide pressor support and blood products.    Supratherapeutic INR- (present on admission)  Assessment & Plan  Greater than 9 with active bleeding we will administer PCC at this time    Hyponatremia-  (present on admission)  Assessment & Plan  asymptomatic no indication for treatment at this time  Fluid resuscitation in ED  BMP in a.m.      Anemia- (present on admission)  Assessment & Plan  Transfuse hemoglobin less than 7  Iron/TIBC/ferritin/B12/reticulocyte count ordered and pending  CBC in a.m.      Prolonged Q-T interval on ECG- (present on admission)  Assessment & Plan  Avoid QTC prolonging agents  Ativan for nausea vomiting    Thrombocytopenia (HCC)- (present on admission)  Assessment & Plan  At level of 92 and with her active bleed we are giving PCC and if continues will give 1 unit platelets for severe active bleed and platelet count less than 100  Repeat CBC  Likely secondary to longstanding alcoholism with elevated supratherapeutic INR, macrocytosis also appreciated.      Severe protein-calorie malnutrition (HCC)- (present on admission)  Assessment & Plan  Recommend daytime hospitalist consult with nutritional services for supplements with meals    Dilated cardiomyopathy (HCC)- (present on admission)  Assessment & Plan  Likely from longstanding methamphetamine abuse/alcoholism leading to her dilated cardiomyopathy.  Last ejection fraction 15% given 2 L in ED, we will obtain chest x-ray at this time and refrain from further fluid resuscitation if hypotension still persists we will administer pressor support in IMCU.  Ordered and held home medication regimen for her chronic systolic heart failure however if maintaining hypotension we will hold off on medication administration.    Macrocytosis- (present on admission)  Assessment & Plan  Secondary to longstanding alcoholism  CBC in a.m.    Methamphetamine abuse (HCC)- (present on admission)  Assessment & Plan  Urine drug test ordered and pending  Dilated cardiomyopathy appreciated in history and last echocardiogram ejection fraction 15%    Advance care planning- (present on admission)  Assessment & Plan  Greater than 20-minute spent at bedside discussing  diagnostic work-up/prognosis and treatment plan.  CODE STATUS obtained at bedside and patient wishes for DO NOT RESUSCITATE DO NOT INTUBATE CODE STATUS with nurse present/intensivist present and myself and she is alert and oriented x4.      VTE prophylaxis: pharmacologic prophylaxis contraindicated due to Active bleed     I spent greater than 35 minutes assessing patient and providing critical care excluding procedures.

## 2022-05-15 NOTE — PROGRESS NOTES
Pt transferred to [id number] at 1559 on 5/15/22 via wheelchair by [facility]. Report given to DANNY Hobson.

## 2022-05-15 NOTE — PROGRESS NOTES
[facility]CU Acceptance Note    Called by ERP for admission to IMCU for epistaxis with hypotension, PMH cardiomyopathy, EF 15-20, cirrhosis, anticoagulated with Xarelto for AF.  Will admit to IMCU under the care of the hospitalist.

## 2022-05-15 NOTE — ED TRIAGE NOTES
Pt to rm b20 bib remsa   Chief Complaint   Patient presents with   • Epistaxis     Pt sent from [facility] for evaluation of epistaxis

## 2022-05-15 NOTE — ASSESSMENT & PLAN NOTE
Secondary to substance abuse.  Echocardiogram from 4/11/2022 showed LVEF of ~ 15-20%. On comfort Care

## 2022-05-15 NOTE — PROGRESS NOTES
4 Eyes Skin Assessment Completed by DANNY Hobson and DANNY Mishra.    Head Bruising around eyes and cheeks  Ears WDL  Nose WDL  Mouth WDL  Neck WDL  Breast/Chest WDL  Shoulder Blades WDL  Spine WDL  (R) Arm/Elbow/Hand Bruising  (L) Arm/Elbow/Hand Bruising  Abdomen WDL  Groin WDL  Scrotum/Coccyx/Buttocks Blanching  (R) Leg WDL  (L) Leg WDL   (R) Heel/Foot/Toe dry/flaky  (L) Heel/Foot/Toe dry/flaky          Devices In Places ECG, Tele Box, Blood Pressure Cuff, SCD's and Oxy Mask      Interventions In Place Chair Waffle, Waffle Overlay, Pillows, Q2 Turns, Barrier Cream, Heels Loaded W/Pillows and Pressure Redistribution Mattress Purewick for incontinence    Possible Skin Injury No    Pictures Uploaded Into Epic N/A  Wound Consult Placed N/A  RN Wound Prevention Protocol Ordered No

## 2022-05-15 NOTE — PROGRESS NOTES
Patient transferred to unit, pt care assumed, VSS, pt assessment complete. Pt AAOx4, with no complaint of pain at this time. No signs of acute distress noted at this time. Plan of care discussed with pt and verbalizes no questions. Pt denies any additional needs at this time. Bed locked/in lowest position, bed alarm on, pt educated on fall risk and verbalized understanding, call light within reach, hourly rounding initiated.

## 2022-05-15 NOTE — PROGRESS NOTES
[facility] Medicine Daily Progress Note    Date of Service  5/15/2022    Chief Complaint  Maral Herrera is a 56 y.o. female admitted 5/14/2022 with severe epistaxis for 3 days    Hospital Course  This is a 56-year-old female with a history of a brain tumor, alcohol use, methamphetamine use, severe dilated cardiomyopathy presumed from methamphetamine use, residing at [facility], had a reported ground-level fall approximately a month ago with periorbital swelling and maxillary and sphenoid ecchymosis, the patient is on aspirin as well as Xarelto at the nursing facility and develops a significant episode of epistaxis.  The patient has a cardiomyopathy with ejection fraction of 15%.  The patient was seen in this facility at the beginning of April, cardiology was consulted, the patient at the time positive for methamphetamine.  The patient has a history of longstanding alcohol abuse.  She does have cirrhosis and reportedly has a TIPS in place.  The patient was on Xarelto what appears to be for DVT prophylaxis at the nursing facility as well as aspirin for her heart condition.  The patient had reversal of her anticoagulation with Kcentra.  I had a conversation with the patient's mother today who gave additional history.  Apparently she had been diagnosed with a brain tumor and during the surgery the patient had severe bleeding and the procedure was aborted.  A recent CT scan of the brain shows encephalomalacia but no definitive tumor residual.  Patient is found to have a UTI, she is febrile and was started on empiric antibiotics with cultures pending.    Interval Problem Update  Patient seen and examined today.  Data, Medication data reviewed.  Case discussed with nursing as available.  Plan of Care reviewed with patient and notified of changes.  5/15, the patient is complaining of achiness, pain in her legs and arms, she is unwell, currently no further nosebleed noted.  T-max 100.3, heart rate in the  90s, respiration unlabored, 4 L nasal cannula oxygen, blood pressure initially down to 70/50, currently 104 with 60, given limited amount of fluid resuscitation secondary to the patient's poor ejection fraction.  Hemoglobin had been stable, the patient with significant hyponatremia, prerenal azotemia, total bilirubin of 4.6, albumin 2.1.  No iron deficiency identified, the patient severely coagulopathic initially elevated INR of 9.32, her TEG showed a platelet inhibition also, elevated procalcitonin,  Chest x-ray without cardiopulmonary disease, cardiomegaly is noted    I have personally seen and examined the patient at bedside. I discussed the plan of care with patient.  Patient's mother  Consultants/Specialty  critical care    Code Status  DNAR/DNI    Disposition  Patient is not medically cleared for discharge.   Anticipate discharge to to skilled nursing facility.  I have placed the appropriate orders for post-discharge needs.    Review of Systems  Review of Systems   Constitutional: Positive for fever and malaise/fatigue. Negative for chills.   HENT: Positive for nosebleeds.    Eyes: Negative.    Respiratory: Negative.  Negative for cough.    Cardiovascular: Negative.  Negative for chest pain and palpitations.   Gastrointestinal: Negative.  Negative for heartburn, nausea and vomiting.   Genitourinary: Positive for urgency. Negative for dysuria and frequency.   Musculoskeletal: Positive for myalgias. Negative for back pain and neck pain.   Skin: Negative.  Negative for itching and rash.   Neurological: Positive for focal weakness and headaches. Negative for dizziness and weakness.   Endo/Heme/Allergies: Negative.  Negative for polydipsia. Does not bruise/bleed easily.   Psychiatric/Behavioral: Negative for depression. The patient is nervous/anxious.         Physical Exam  Temp:  [36.7 °C (98 °F)-37.9 °C (100.3 °F)] 37.3 °C (99.1 °F)  Pulse:  [] 98  Resp:  [12-30] 20  BP: ()/(35-77) 104/60  SpO2:  [89  %-96 %] 90 %    Physical Exam  Vitals and nursing note reviewed.   Constitutional:       Appearance: She is well-developed and overweight. She is ill-appearing. She is not diaphoretic.      Interventions: Nasal cannula in place.   HENT:      Head: Normocephalic and atraumatic.      Comments: Ecchymosis, bruising     Nose: Nose normal.   Eyes:      Conjunctiva/sclera: Conjunctivae normal.      Pupils: Pupils are equal, round, and reactive to light.   Neck:      Thyroid: No thyromegaly.      Vascular: No JVD.   Cardiovascular:      Rate and Rhythm: Normal rate and regular rhythm.      Heart sounds: Normal heart sounds.     No friction rub. No gallop.   Pulmonary:      Effort: Pulmonary effort is normal.      Breath sounds: Normal breath sounds. No wheezing or rales.   Abdominal:      General: Bowel sounds are normal. There is no distension.      Palpations: Abdomen is soft. There is no mass.      Tenderness: There is no abdominal tenderness. There is no guarding or rebound.   Musculoskeletal:         General: Tenderness present. Normal range of motion.      Cervical back: Normal range of motion and neck supple.   Lymphadenopathy:      Cervical: No cervical adenopathy.   Skin:     General: Skin is warm and dry.      Coloration: Skin is jaundiced.      Findings: Bruising, lesion and rash present.   Neurological:      Mental Status: She is oriented to person, place, and time. She is lethargic.      Cranial Nerves: No cranial nerve deficit.   Psychiatric:         Behavior: Behavior normal.         Fluids    Intake/Output Summary (Last 24 hours) at 5/15/2022 1338  Last data filed at 5/15/2022 1200  Gross per 24 hour   Intake 2151.67 ml   Output 200 ml   Net 1951.67 ml       Laboratory  Recent Labs     05/14/22  1800 05/14/22  2345 05/15/22  1030   WBC 7.0 5.6 6.8   RBC 3.35* 3.11* 3.35*   HEMOGLOBIN 11.8* 11.2* 11.9*   HEMATOCRIT 35.0* 32.6* 34.7*   .5* 104.8* 103.6*   MCH 35.2* 36.0* 35.5*   MCHC 33.7 34.4 34.3    RDW 65.7* 65.4* 65.0*   PLATELETCT 93* 92* 90*   MPV 11.3 11.3 11.0     Recent Labs     05/14/22  1752 05/14/22  2345   SODIUM 129* 129*   POTASSIUM 4.7 4.5   CHLORIDE 98 101   CO2 20 18*   GLUCOSE 137* 111*   BUN 38* 39*   CREATININE 1.34 1.18   CALCIUM 8.3* 7.5*     Recent Labs     05/14/22  1852   APTT 68.8*   INR 9.32*               Imaging  DX-CHEST-PORTABLE (1 VIEW)   Final Result         1.  No acute cardiopulmonary disease.   2.  Cardiomegaly   3.  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)  Assessment & Plan  Reported by the patient's mother, unclear details apparently this was addressed in [place] in the past  Consider MRI for follow-up      Acute cystitis without hematuria  Assessment & Plan  Empiric antibiotics, cultures    Hyponatremia- (present on admission)  Assessment & Plan  Monitor      Shock (HCC)- (present on admission)  Assessment & Plan  Fluid responsive shock in ED with 2 L of crystalloid fluid   Currently improved    Supratherapeutic INR- (present on admission)  Assessment & Plan  Greater than 9 with active bleeding we will administer PCC at this time    Anemia- (present on admission)  Assessment & Plan  Transfuse hemoglobin less than 7        Advance care planning- (present on admission)  Assessment & Plan    CODE STATUS obtained at bedside and patient wishes for DO NOT RESUSCITATE DO NOT INTUBATE CODE STATUS with nurse present/intensivist present and myself and she is alert and oriented x4.    Prolonged Q-T interval on ECG- (present on admission)  Assessment & Plan  Avoid QTC prolonging agents  Ativan for nausea vomiting    Thrombocytopenia (HCC)- (present on  admission)  Assessment & Plan  Platelet admission secondary to aspirin use  Current bleeding has stopped, monitor  Consider platelet transfusion if rebleeding      Severe protein-calorie malnutrition (HCC)- (present on admission)  Assessment & Plan  Dietary consult    Dilated cardiomyopathy (HCC)- (present on admission)  Assessment & Plan  Likely from longstanding methamphetamine abuse/alcoholism leading to her dilated cardiomyopathy.  Last ejection fraction 15% given 2 L in ED  Resume medication regimen once the patient tolerates    Macrocytosis- (present on admission)  Assessment & Plan  Secondary to longstanding alcoholism      Methamphetamine abuse (HCC)- (present on admission)  Assessment & Plan  Urine drug test ordered and pending  Dilated cardiomyopathy appreciated in history and last echocardiogram ejection fraction 15%    Liver cirrhosis (HCC)- (present on admission)  Assessment & Plan  With elevated bilirubin, longstanding alcohol history     Plan  Empiric antibiotics for UTI  Resume cardiac medication as tolerated, with holding of aspirin and any other anticoagulation for the time being in light of the patient's nosebleed  Nasal saline  Supportive care  SCDs  Close laboratory follow-up in terms of correction of anticoagulation and other metabolic and blood count derangements  See orders  Medically complex high risk patient    VTE prophylaxis: pharmacologic prophylaxis contraindicated due to Bleeding    I have performed a physical exam and reviewed and updated ROS and Plan today (5/15/2022). In review of yesterday's note (5/14/2022), there are no changes except as documented above.      Please note that this dictation was created using voice recognition software. I have made every reasonable attempt to correct obvious errors, but I expect that there are errors of grammar and possibly context that I did not discover before finalizing the note.

## 2022-05-16 PROBLEM — A41.9 SEPTIC SHOCK (HCC): Status: ACTIVE | Noted: 2022-05-15

## 2022-05-16 PROBLEM — R65.21 SEPTIC SHOCK (HCC): Status: ACTIVE | Noted: 2022-05-15

## 2022-05-16 LAB
ALBUMIN SERPL BCP-MCNC: 2.1 G/DL (ref 3.2–4.9)
ALBUMIN/GLOB SERPL: 0.6 G/DL
ALP SERPL-CCNC: 127 U/L (ref 30–99)
ALT SERPL-CCNC: 25 U/L (ref 2–50)
AMMONIA PLAS-SCNC: 27 UMOL/L (ref 11–45)
ANION GAP SERPL CALC-SCNC: 12 MMOL/L (ref 7–16)
AST SERPL-CCNC: 47 U/L (ref 12–45)
BASOPHILS # BLD AUTO: 0.6 % (ref 0–1.8)
BASOPHILS # BLD: 0.04 K/UL (ref 0–0.12)
BILIRUB SERPL-MCNC: 4.4 MG/DL (ref 0.1–1.5)
BUN SERPL-MCNC: 30 MG/DL (ref 8–22)
CALCIUM SERPL-MCNC: 7.5 MG/DL (ref 8.5–10.5)
CHLORIDE SERPL-SCNC: 97 MMOL/L (ref 96–112)
CO2 SERPL-SCNC: 17 MMOL/L (ref 20–33)
CREAT SERPL-MCNC: 1 MG/DL (ref 0.5–1.4)
EKG IMPRESSION: NORMAL
EOSINOPHIL # BLD AUTO: 0.02 K/UL (ref 0–0.51)
EOSINOPHIL NFR BLD: 0.3 % (ref 0–6.9)
ERYTHROCYTE [DISTWIDTH] IN BLOOD BY AUTOMATED COUNT: 63.7 FL (ref 35.9–50)
GFR SERPLBLD CREATININE-BSD FMLA CKD-EPI: 65 ML/MIN/1.73 M 2
GLOBULIN SER CALC-MCNC: 3.8 G/DL (ref 1.9–3.5)
GLUCOSE SERPL-MCNC: 116 MG/DL (ref 65–99)
HCT VFR BLD AUTO: 33.5 % (ref 37–47)
HGB BLD-MCNC: 11.4 G/DL (ref 12–16)
IMM GRANULOCYTES # BLD AUTO: 0.03 K/UL (ref 0–0.11)
IMM GRANULOCYTES NFR BLD AUTO: 0.5 % (ref 0–0.9)
INR PPP: 2.78 (ref 0.87–1.13)
LYMPHOCYTES # BLD AUTO: 0.77 K/UL (ref 1–4.8)
LYMPHOCYTES NFR BLD: 11.9 % (ref 22–41)
MAGNESIUM SERPL-MCNC: 1.3 MG/DL (ref 1.5–2.5)
MCH RBC QN AUTO: 34.8 PG (ref 27–33)
MCHC RBC AUTO-ENTMCNC: 34 G/DL (ref 33.6–35)
MCV RBC AUTO: 102.1 FL (ref 81.4–97.8)
MONOCYTES # BLD AUTO: 1.06 K/UL (ref 0–0.85)
MONOCYTES NFR BLD AUTO: 16.4 % (ref 0–13.4)
NEUTROPHILS # BLD AUTO: 4.55 K/UL (ref 2–7.15)
NEUTROPHILS NFR BLD: 70.3 % (ref 44–72)
NRBC # BLD AUTO: 0 K/UL
NRBC BLD-RTO: 0 /100 WBC
NT-PROBNP SERPL IA-MCNC: 3173 PG/ML (ref 0–125)
PHOSPHATE SERPL-MCNC: 2.7 MG/DL (ref 2.5–4.5)
PLATELET # BLD AUTO: 85 K/UL (ref 164–446)
PMV BLD AUTO: 10.7 FL (ref 9–12.9)
POTASSIUM SERPL-SCNC: 3.9 MMOL/L (ref 3.6–5.5)
PROT SERPL-MCNC: 5.9 G/DL (ref 6–8.2)
PROTHROMBIN TIME: 28.5 SEC (ref 12–14.6)
RBC # BLD AUTO: 3.28 M/UL (ref 4.2–5.4)
SODIUM SERPL-SCNC: 126 MMOL/L (ref 135–145)
WBC # BLD AUTO: 6.5 K/UL (ref 4.8–10.8)

## 2022-05-16 PROCEDURE — 80053 COMPREHEN METABOLIC PANEL: CPT

## 2022-05-16 PROCEDURE — 700102 HCHG RX REV CODE 250 W/ 637 OVERRIDE(OP): Performed by: STUDENT IN AN ORGANIZED HEALTH CARE EDUCATION/TRAINING PROGRAM

## 2022-05-16 PROCEDURE — 83880 ASSAY OF NATRIURETIC PEPTIDE: CPT

## 2022-05-16 PROCEDURE — 84100 ASSAY OF PHOSPHORUS: CPT

## 2022-05-16 PROCEDURE — A9270 NON-COVERED ITEM OR SERVICE: HCPCS | Performed by: STUDENT IN AN ORGANIZED HEALTH CARE EDUCATION/TRAINING PROGRAM

## 2022-05-16 PROCEDURE — 700101 HCHG RX REV CODE 250: Performed by: STUDENT IN AN ORGANIZED HEALTH CARE EDUCATION/TRAINING PROGRAM

## 2022-05-16 PROCEDURE — 36415 COLL VENOUS BLD VENIPUNCTURE: CPT

## 2022-05-16 PROCEDURE — 83735 ASSAY OF MAGNESIUM: CPT

## 2022-05-16 PROCEDURE — 82140 ASSAY OF AMMONIA: CPT

## 2022-05-16 PROCEDURE — 770020 HCHG ROOM/CARE - TELE (206)

## 2022-05-16 PROCEDURE — 700111 HCHG RX REV CODE 636 W/ 250 OVERRIDE (IP): Performed by: STUDENT IN AN ORGANIZED HEALTH CARE EDUCATION/TRAINING PROGRAM

## 2022-05-16 PROCEDURE — 93010 ELECTROCARDIOGRAM REPORT: CPT | Performed by: INTERNAL MEDICINE

## 2022-05-16 PROCEDURE — 85610 PROTHROMBIN TIME: CPT

## 2022-05-16 PROCEDURE — 85025 COMPLETE CBC W/AUTO DIFF WBC: CPT

## 2022-05-16 PROCEDURE — 99232 SBSQ HOSP IP/OBS MODERATE 35: CPT | Performed by: STUDENT IN AN ORGANIZED HEALTH CARE EDUCATION/TRAINING PROGRAM

## 2022-05-16 RX ORDER — CHOLECALCIFEROL (VITAMIN D3) 125 MCG
5 CAPSULE ORAL NIGHTLY
Status: DISCONTINUED | OUTPATIENT
Start: 2022-05-16 | End: 2022-05-20

## 2022-05-16 RX ADMIN — BUDESONIDE AND FORMOTEROL FUMARATE DIHYDRATE 2 PUFF: 80; 4.5 AEROSOL RESPIRATORY (INHALATION) at 17:45

## 2022-05-16 RX ADMIN — OXYMETAZOLINE HCL 2 SPRAY: 0.05 SPRAY NASAL at 17:45

## 2022-05-16 RX ADMIN — FUROSEMIDE 20 MG: 20 TABLET ORAL at 05:49

## 2022-05-16 RX ADMIN — Medication 5 MG: at 21:00

## 2022-05-16 RX ADMIN — ATORVASTATIN CALCIUM 20 MG: 20 TABLET, FILM COATED ORAL at 17:44

## 2022-05-16 RX ADMIN — SENNOSIDES AND DOCUSATE SODIUM 2 TABLET: 50; 8.6 TABLET ORAL at 17:44

## 2022-05-16 RX ADMIN — SENNOSIDES AND DOCUSATE SODIUM 2 TABLET: 50; 8.6 TABLET ORAL at 05:46

## 2022-05-16 RX ADMIN — TIOTROPIUM BROMIDE INHALATION SPRAY 5 MCG: 3.12 SPRAY, METERED RESPIRATORY (INHALATION) at 05:49

## 2022-05-16 RX ADMIN — BUDESONIDE AND FORMOTEROL FUMARATE DIHYDRATE 2 PUFF: 80; 4.5 AEROSOL RESPIRATORY (INHALATION) at 05:49

## 2022-05-16 RX ADMIN — OXYMETAZOLINE HCL 2 SPRAY: 0.05 SPRAY NASAL at 05:57

## 2022-05-16 RX ADMIN — CEFTRIAXONE SODIUM 1 G: 10 INJECTION, POWDER, FOR SOLUTION INTRAVENOUS at 13:05

## 2022-05-16 RX ADMIN — METOPROLOL SUCCINATE 25 MG: 25 TABLET, EXTENDED RELEASE ORAL at 05:49

## 2022-05-16 ASSESSMENT — ENCOUNTER SYMPTOMS
NERVOUS/ANXIOUS: 1
GASTROINTESTINAL NEGATIVE: 1
FOCAL WEAKNESS: 1
NAUSEA: 0
BRUISES/BLEEDS EASILY: 0
DEPRESSION: 0
BACK PAIN: 0
COUGH: 0
HEADACHES: 1
RESPIRATORY NEGATIVE: 1
VOMITING: 0
PALPITATIONS: 0
EYES NEGATIVE: 1
POLYDIPSIA: 0
FEVER: 1
HEARTBURN: 0
CHILLS: 0
DIZZINESS: 0
WEAKNESS: 0
MYALGIAS: 1
NECK PAIN: 0
CARDIOVASCULAR NEGATIVE: 1

## 2022-05-16 ASSESSMENT — FIBROSIS 4 INDEX: FIB4 SCORE: 6.3

## 2022-05-16 ASSESSMENT — PAIN DESCRIPTION - PAIN TYPE
TYPE: ACUTE PAIN
TYPE: ACUTE PAIN

## 2022-05-16 NOTE — DISCHARGE PLANNING
Case Management Discharge Planning    Admission Date: 5/14/2022  GMLOS: 2.1  ALOS: 2    6-Clicks ADL Score: 14  6-Clicks Mobility Score: 12  PT and/or OT Eval ordered: Yes  Post-acute Referrals Ordered: No  Post-acute Choice Obtained: Yes  Has referral(s) been sent to post-acute provider:  Yes      Anticipated Discharge Dispo: Discharge Disposition: D/T to SNF with Medicare cert in anticipation of skilled care (03)    DME Needed: No    Action(s) Taken: LSW spoke with pt about SNF. Pt reported she has been at [facility] and would like to return there. Pt also agreeable with sending blanket referrals to other SNFs in case [facility] cannot take her back. LSW faxed SNF choice form to [facility].    Escalations Completed: None    Medically Clear: No    Next Steps: Care coordination will follow up with SNF referrals    Barriers to Discharge: Pending Placement    Is the patient up for discharge tomorrow: No

## 2022-05-16 NOTE — PROGRESS NOTES
Received Bedside report. Assumed care at 1900. This pt is AOx4, ambulatory with max assist, voiding with pure wick in place, 8/10 neck pain. Patient and RN discussed plan of care: questions answered. Labs noted, assessment complete, patient tolerating [id number] fluid restricted diet. Tele box in place. Pt is on 2L of O2 via  Nasal canula. Call light in place, fall precautions in place, patient educated on importance of calling for assistance. No additional needs at this time. VSS

## 2022-05-16 NOTE — CARE PLAN
"  Problem: Knowledge Deficit - Standard  Goal: Patient and family/care givers will demonstrate understanding of plan of care, disease process/condition, diagnostic tests and medications  Outcome: Progressing     Problem: Pain - Standard  Goal: Alleviation of pain or a reduction in pain to the patient’s comfort goal  Outcome: Progressing     Problem: Skin Integrity  Goal: Skin integrity is maintained or improved  Outcome: Progressing   The patient is Stable - Low risk of patient condition declining or worsening    Shift Goals  Clinical Goals: Pt will get up to chair for all meals  Patient Goals: \"Go to rehab\"  Family Goals: N/A        "

## 2022-05-16 NOTE — DISCHARGE PLANNING
Case Management Discharge Planning    Admission Date: 5/14/2022  GMLOS: 3.4  ALOS: 2    6-Clicks ADL Score: 14  6-Clicks Mobility Score: 12  PT and/or OT Eval ordered: Yes  Post-acute Referrals Ordered: No  Post-acute Choice Obtained: No  Has referral(s) been sent to post-acute provider:  No      Anticipated Discharge Dispo: D/T to SNF with Medicare cert in anticipation of skilled care (03)    DME Needed: Likely will need walker. Pending PT/OT eval.    Action(s) Taken: Updated Provider/Nurse on Discharge Plan    Escalations Completed: PT and Pending Discharge Destination    Medically Clear: No    Next Steps: f/u with medical and nursing teams regarding treatment plan, discharge planning needs and barriers. F/u with patient regarding discharge planning needs, barriers, and placement choice. Obtain SNF choice and submit referrals. Patient will need continued IVABTx on discharge.    Barriers to Discharge: Medical clearance, Pending Placement and Pending PT Evaluation    Is the patient up for discharge tomorrow: No

## 2022-05-16 NOTE — PROGRESS NOTES
Patient has had wide BBB since arrival to tele unit. Per monitor strip at 1148 today in IMCU, QRS estimated to be 0.08. Patient's heartrate noted to be slightly elevated recently to 120-130s from 110s. Patient states she just wants to lie down and rest, complaining of pain in thighs, refused intervention. Stat EKG ordered.  EKG completed, per charge DANNY Rice, EKG okay with LBBB, Monitor strip from afternoon shows BBB.

## 2022-05-16 NOTE — DISCHARGE PLANNING
Received Choice form at 0616  Agency/Facility Name: [facility]   Referral sent per Choice form @ [phone]

## 2022-05-16 NOTE — DISCHARGE PLANNING
Care Transition Team Assessment    LSW met with pt at bedside to complete assessment. Pt A&Ox4 and able to verify the information on the face sheet. Pt reported she lives with three roommates in a single story house with no steps to enter. Pt reported there is a basement, however she stays on the first floor. Pt's friend/roommate, Elicia, is also the pt's caregiver. Elicia assists the pt with most ADLs and IADLs and is a good support for the pt. Pt reported she has a wheelchair, electric wheelchair, walker, and shower chair at home.    Pt receives SSDI of $1,187/month. LSW sent email to [facility] to request the pt be screened for medicaid. Pt denies any SA or MH concerns. Pt has a POLST on file.    Information Source  Orientation Level: Oriented X4  Information Given By: Patient  Informant's Name: Maral Herrera  Who is responsible for making decisions for patient? : Patient    Readmission Evaluation  Is this a readmission?: Yes - unplanned readmission    Elopement Risk  Legal Hold: No  Ambulatory or Self Mobile in Wheelchair: No-Not an Elopement Risk  Disoriented: No  Psychiatric Symptoms: None  History of Wandering: No  Elopement this Admit: No  Vocalizing Wanting to Leave: No  Displays Behaviors, Body Language Wanting to Leave: No-Not at Risk for Elopement  Elopement Risk: Not at Risk for Elopement    Interdisciplinary Discharge Planning  Durable Medical Equipment: Walker, Other - Specify (wheelchair, shower chair)    Discharge Preparedness  What is your plan after discharge?: Skilled nursing facility  What are your discharge supports?: Other (comment) (caregiver)  Prior Functional Level: Needs Assist with Activities of Daily Living, Needs Assist with Medication Management, Uses Wheelchair, Uses Walker, Ambulatory  Difficulity with ADLs: Bathing  Difficulity with IADLs: Cooking, Driving, Shopping    Functional Assesment  Prior Functional Level: Needs Assist with Activities of Daily Living, Needs Assist with Medication  Management, Uses Wheelchair, Uses Walker, Ambulatory    Finances  Financial Barriers to Discharge: No  Prescription Coverage: Yes    Advance Directive  Advance Directive?: POLST    Domestic Abuse  Have you ever been the victim of abuse or violence?: No    Psychological Assessment  History of Substance Abuse: None  History of Psychiatric Problems: No  Non-compliant with Treatment: No  Newly Diagnosed Illness: No    Discharge Risks or Barriers  Discharge risks or barriers?: No PCP  Patient risk factors: No PCP    Anticipated Discharge Information  Discharge Disposition: D/T to SNF with Medicare cert in anticipation of skilled care (03)

## 2022-05-17 PROBLEM — R78.81 BACTEREMIA: Status: ACTIVE | Noted: 2022-05-17

## 2022-05-17 LAB
BACTERIA UR CULT: ABNORMAL
BACTERIA UR CULT: ABNORMAL
SIGNIFICANT IND 70042: ABNORMAL
SITE SITE: ABNORMAL
SOURCE SOURCE: ABNORMAL

## 2022-05-17 PROCEDURE — 770020 HCHG ROOM/CARE - TELE (206)

## 2022-05-17 PROCEDURE — 700102 HCHG RX REV CODE 250 W/ 637 OVERRIDE(OP): Performed by: STUDENT IN AN ORGANIZED HEALTH CARE EDUCATION/TRAINING PROGRAM

## 2022-05-17 PROCEDURE — 700111 HCHG RX REV CODE 636 W/ 250 OVERRIDE (IP): Performed by: STUDENT IN AN ORGANIZED HEALTH CARE EDUCATION/TRAINING PROGRAM

## 2022-05-17 PROCEDURE — 700111 HCHG RX REV CODE 636 W/ 250 OVERRIDE (IP): Performed by: INTERNAL MEDICINE

## 2022-05-17 PROCEDURE — A9270 NON-COVERED ITEM OR SERVICE: HCPCS | Performed by: STUDENT IN AN ORGANIZED HEALTH CARE EDUCATION/TRAINING PROGRAM

## 2022-05-17 PROCEDURE — 99232 SBSQ HOSP IP/OBS MODERATE 35: CPT | Performed by: INTERNAL MEDICINE

## 2022-05-17 PROCEDURE — 97166 OT EVAL MOD COMPLEX 45 MIN: CPT

## 2022-05-17 PROCEDURE — 36415 COLL VENOUS BLD VENIPUNCTURE: CPT

## 2022-05-17 PROCEDURE — 700101 HCHG RX REV CODE 250: Performed by: STUDENT IN AN ORGANIZED HEALTH CARE EDUCATION/TRAINING PROGRAM

## 2022-05-17 PROCEDURE — 700105 HCHG RX REV CODE 258: Performed by: INTERNAL MEDICINE

## 2022-05-17 PROCEDURE — 87040 BLOOD CULTURE FOR BACTERIA: CPT | Mod: 91

## 2022-05-17 PROCEDURE — 97162 PT EVAL MOD COMPLEX 30 MIN: CPT

## 2022-05-17 RX ORDER — MAGNESIUM SULFATE HEPTAHYDRATE 40 MG/ML
2 INJECTION, SOLUTION INTRAVENOUS ONCE
Status: COMPLETED | OUTPATIENT
Start: 2022-05-17 | End: 2022-05-17

## 2022-05-17 RX ADMIN — OXYCODONE 2.5 MG: 5 TABLET ORAL at 23:29

## 2022-05-17 RX ADMIN — BUDESONIDE AND FORMOTEROL FUMARATE DIHYDRATE 2 PUFF: 80; 4.5 AEROSOL RESPIRATORY (INHALATION) at 05:24

## 2022-05-17 RX ADMIN — CEFTRIAXONE SODIUM 1 G: 10 INJECTION, POWDER, FOR SOLUTION INTRAVENOUS at 11:39

## 2022-05-17 RX ADMIN — MEROPENEM 500 MG: 500 INJECTION, POWDER, FOR SOLUTION INTRAVENOUS at 13:27

## 2022-05-17 RX ADMIN — LORAZEPAM 0.5 MG: 2 INJECTION INTRAMUSCULAR; INTRAVENOUS at 23:48

## 2022-05-17 RX ADMIN — MEROPENEM 500 MG: 500 INJECTION, POWDER, FOR SOLUTION INTRAVENOUS at 23:43

## 2022-05-17 RX ADMIN — FUROSEMIDE 20 MG: 20 TABLET ORAL at 05:24

## 2022-05-17 RX ADMIN — Medication 5 MG: at 20:37

## 2022-05-17 RX ADMIN — TIOTROPIUM BROMIDE INHALATION SPRAY 5 MCG: 3.12 SPRAY, METERED RESPIRATORY (INHALATION) at 05:31

## 2022-05-17 RX ADMIN — ATORVASTATIN CALCIUM 20 MG: 20 TABLET, FILM COATED ORAL at 17:17

## 2022-05-17 RX ADMIN — OXYMETAZOLINE HCL 2 SPRAY: 0.05 SPRAY NASAL at 17:17

## 2022-05-17 RX ADMIN — MAGNESIUM SULFATE HEPTAHYDRATE 2 G: 40 INJECTION, SOLUTION INTRAVENOUS at 11:39

## 2022-05-17 RX ADMIN — SENNOSIDES AND DOCUSATE SODIUM 2 TABLET: 50; 8.6 TABLET ORAL at 05:24

## 2022-05-17 RX ADMIN — OXYMETAZOLINE HCL 2 SPRAY: 0.05 SPRAY NASAL at 05:24

## 2022-05-17 RX ADMIN — METOPROLOL SUCCINATE 25 MG: 25 TABLET, EXTENDED RELEASE ORAL at 05:24

## 2022-05-17 RX ADMIN — MEROPENEM 500 MG: 500 INJECTION, POWDER, FOR SOLUTION INTRAVENOUS at 17:17

## 2022-05-17 ASSESSMENT — ENCOUNTER SYMPTOMS
HEADACHES: 1
EYES NEGATIVE: 1
POLYDIPSIA: 0
PALPITATIONS: 0
BACK PAIN: 0
NAUSEA: 0
RESPIRATORY NEGATIVE: 1
NECK PAIN: 0
WEAKNESS: 0
NERVOUS/ANXIOUS: 1
FOCAL WEAKNESS: 1
COUGH: 0
HEARTBURN: 0
DEPRESSION: 0
VOMITING: 0
MYALGIAS: 1
BRUISES/BLEEDS EASILY: 0
CHILLS: 0
FEVER: 1
CARDIOVASCULAR NEGATIVE: 1
GASTROINTESTINAL NEGATIVE: 1
DIZZINESS: 0

## 2022-05-17 ASSESSMENT — COGNITIVE AND FUNCTIONAL STATUS - GENERAL
TURNING FROM BACK TO SIDE WHILE IN FLAT BAD: A LOT
DAILY ACTIVITIY SCORE: 16
MOBILITY SCORE: 13
MOVING TO AND FROM BED TO CHAIR: A LOT
CLIMB 3 TO 5 STEPS WITH RAILING: A LOT
WALKING IN HOSPITAL ROOM: A LITTLE
PERSONAL GROOMING: A LITTLE
DRESSING REGULAR LOWER BODY CLOTHING: A LOT
EATING MEALS: A LITTLE
SUGGESTED CMS G CODE MODIFIER MOBILITY: CL
TOILETING: A LITTLE
STANDING UP FROM CHAIR USING ARMS: A LITTLE
MOVING FROM LYING ON BACK TO SITTING ON SIDE OF FLAT BED: UNABLE
DRESSING REGULAR UPPER BODY CLOTHING: A LITTLE
HELP NEEDED FOR BATHING: A LOT
SUGGESTED CMS G CODE MODIFIER DAILY ACTIVITY: CK

## 2022-05-17 ASSESSMENT — PAIN DESCRIPTION - PAIN TYPE
TYPE: ACUTE PAIN
TYPE: ACUTE PAIN

## 2022-05-17 ASSESSMENT — GAIT ASSESSMENTS
DISTANCE (FEET): 20
ASSISTIVE DEVICE: HAND HELD ASSIST
DEVIATION: STEP TO;DECREASED BASE OF SUPPORT;BRADYKINETIC;DECREASED HEEL STRIKE;DECREASED TOE OFF
GAIT LEVEL OF ASSIST: MINIMAL ASSIST

## 2022-05-17 ASSESSMENT — FIBROSIS 4 INDEX
FIB4 SCORE: 6.3
FIB4 SCORE: 6.3

## 2022-05-17 ASSESSMENT — ACTIVITIES OF DAILY LIVING (ADL): TOILETING: INDEPENDENT

## 2022-05-17 NOTE — CARE PLAN
Problem: Nutritional:  Goal: Achieve adequate nutritional intake  Description: Patient will consume 50% of meals and supplements  Outcome: Not Met   See registration eligible note.

## 2022-05-17 NOTE — PROGRESS NOTES
[facility] Medicine Daily Progress Note    Date of Service  5/17/2022    Chief Complaint  Maral Herrera is a 56 y.o. female admitted 5/14/2022 with severe epistaxis for 3 days    Hospital Course  This is a 56-year-old female with a history of a brain tumor, alcohol use, methamphetamine use, severe dilated cardiomyopathy presumed from methamphetamine use, residing at [facility], had a reported ground-level fall approximately a month ago with periorbital swelling and maxillary and sphenoid ecchymosis, the patient is on aspirin as well as Xarelto at the nursing facility and develops a significant episode of epistaxis.  The patient has a cardiomyopathy with ejection fraction of 15%.  The patient was seen in this facility at the beginning of April, cardiology was consulted, the patient at the time positive for methamphetamine.  The patient has a history of longstanding alcohol abuse.  She does have cirrhosis and reportedly has a TIPS in place.  The patient was on Xarelto what appears to be for DVT prophylaxis at the nursing facility as well as aspirin for her heart condition.  The patient had reversal of her anticoagulation with Kcentra.  I had a conversation with the patient's mother today who gave additional history.  Apparently she had been diagnosed with a brain tumor and during the surgery the patient had severe bleeding and the procedure was aborted.  A recent CT scan of the brain shows encephalomalacia but no definitive tumor residual.  Patient is found to have a UTI, she is febrile and was started on empiric antibiotics with cultures pending.    Interval Problem Update  Patient seen and examined today.  Data, Medication data reviewed.  Case discussed with nursing as available.  Plan of Care reviewed with patient and notified of changes.  5/15, the patient is complaining of achiness, pain in her legs and arms, she is unwell, currently no further nosebleed noted.  T-max 100.3, heart rate in the  90s, respiration unlabored, 4 L nasal cannula oxygen, blood pressure initially down to 70/50, currently 104 with 60, given limited amount of fluid resuscitation secondary to the patient's poor ejection fraction.  Hemoglobin had been stable, the patient with significant hyponatremia, prerenal azotemia, total bilirubin of 4.6, albumin 2.1.  No iron deficiency identified, the patient severely coagulopathic initially elevated INR of 9.32, her TEG showed a platelet inhibition also, elevated procalcitonin,  Chest x-ray without cardiopulmonary disease, cardiomegaly is noted  5/16/2022:  Continue present medical management continue with ceftriaxone patient does have positive blood culture bottles gram-negative rods in addition to urinary culture is also positive for E. coli 10-50,000 colonies.  Repeat labs in the morning.  Patient with a MELD score of approximately 27.  Prognosis extremely guarded.  5/27: Patient seen and examined, afebrile, resting in bed, blood cultures 1/2 growing gram negative rods. Urine cultures growing ESBL. I have consulted infection disease  He prognosis is extremely guarded  Consulted palliative  I have personally seen and examined the patient at bedside. I discussed the plan of care with patient, bedside RN, charge RN, , pharmacy and infectious disease.    Consultants/Specialty  critical care  Infection disease     Code Status  DNAR/DNI    Disposition  Patient is not medically cleared for discharge.   Anticipate discharge to to skilled nursing facility.  I have placed the appropriate orders for post-discharge needs.    Review of Systems  Review of Systems   Constitutional: Positive for fever and malaise/fatigue. Negative for chills.   HENT: Positive for nosebleeds.    Eyes: Negative.    Respiratory: Negative.  Negative for cough.    Cardiovascular: Negative.  Negative for chest pain and palpitations.   Gastrointestinal: Negative.  Negative for heartburn, nausea and vomiting.    Genitourinary: Positive for urgency. Negative for dysuria and frequency.   Musculoskeletal: Positive for myalgias. Negative for back pain and neck pain.   Skin: Negative.  Negative for itching and rash.   Neurological: Positive for focal weakness and headaches. Negative for dizziness and weakness.   Endo/Heme/Allergies: Negative.  Negative for polydipsia. Does not bruise/bleed easily.   Psychiatric/Behavioral: Negative for depression. The patient is nervous/anxious.         Physical Exam  Temp:  [36.1 °C (96.9 °F)-37.6 °C (99.6 °F)] 37.2 °C (98.9 °F)  Pulse:  [] 82  Resp:  [16-18] 18  BP: ()/(62-80) 94/62  SpO2:  [90 %-96 %] 92 %    Physical Exam  Vitals and nursing note reviewed.   Constitutional:       Appearance: She is well-developed and overweight. She is ill-appearing. She is not diaphoretic.      Interventions: Nasal cannula in place.   HENT:      Head: Normocephalic and atraumatic.      Comments: Ecchymosis, bruising     Nose: Nose normal.   Eyes:      Conjunctiva/sclera: Conjunctivae normal.      Pupils: Pupils are equal, round, and reactive to light.   Neck:      Thyroid: No thyromegaly.      Vascular: No JVD.   Cardiovascular:      Rate and Rhythm: Normal rate and regular rhythm.      Heart sounds: Normal heart sounds.     No friction rub. No gallop.   Pulmonary:      Effort: Pulmonary effort is normal.      Breath sounds: Normal breath sounds. No wheezing or rales.   Abdominal:      General: Bowel sounds are normal. There is no distension.      Palpations: Abdomen is soft. There is no mass.      Tenderness: There is no abdominal tenderness. There is no guarding or rebound.   Musculoskeletal:         General: Tenderness present. Normal range of motion.      Cervical back: Normal range of motion and neck supple.   Lymphadenopathy:      Cervical: No cervical adenopathy.   Skin:     General: Skin is warm and dry.      Coloration: Skin is jaundiced.      Findings: Bruising, lesion and rash  present.   Neurological:      Mental Status: She is oriented to person, place, and time. She is lethargic.      Cranial Nerves: No cranial nerve deficit.   Psychiatric:         Behavior: Behavior normal.         Fluids    Intake/Output Summary (Last 24 hours) at 5/17/2022 1245  Last data filed at 5/17/2022 1139  Gross per 24 hour   Intake 700 ml   Output 1000 ml   Net -300 ml       Laboratory  Recent Labs     05/14/22  2345 05/15/22  1030 05/16/22  0216   WBC 5.6 6.8 6.5   RBC 3.11* 3.35* 3.28*   HEMOGLOBIN 11.2* 11.9* 11.4*   HEMATOCRIT 32.6* 34.7* 33.5*   .8* 103.6* 102.1*   MCH 36.0* 35.5* 34.8*   MCHC 34.4 34.3 34.0   RDW 65.4* 65.0* 63.7*   PLATELETCT 92* 90* 85*   MPV 11.3 11.0 10.7     Recent Labs     05/14/22  1752 05/14/22  2345 05/16/22  0216   SODIUM 129* 129* 126*   POTASSIUM 4.7 4.5 3.9   CHLORIDE 98 101 97   CO2 20 18* 17*   GLUCOSE 137* 111* 116*   BUN 38* 39* 30*   CREATININE 1.34 1.18 1.00   CALCIUM 8.3* 7.5* 7.5*     Recent Labs     05/14/22  1852 05/16/22  0216   APTT 68.8*  --    INR 9.32* 2.78*               Imaging  DX-CHEST-PORTABLE (1 VIEW)   Final Result         1.  No acute cardiopulmonary disease.   2.  Cardiomegaly   3.  Atherosclerosis           Assessment/Plan  * Epistaxis- (present on admission)  Assessment & Plan  Significant supratherapeutic INR appreciated on labs and we will hold Xarelto and administer Kcentra  Stopped for the time being  Recheck coags, hold antiplatelet and anticoagulation for the time being    Bacteremia  Assessment & Plan  Blood cultures 1/2 growing gram negative rods  Urine cultures growing ESBL  ID consulted appreciate rec.     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 coli 10-50,000 colonies in addition patient does have positive blood culture bottle for gram-negative rods.  Continue ceftriaxone 1 g continue fluid resuscitation as needed be mindful of fluid resuscitation the patient has decreased albumin likely will end up start third spacing.  Patient continues to have elevated INR no overt signs of bleeding.  Patient with MELD score of 27.    Supratherapeutic INR- (present on admission)  Assessment & Plan  Greater than 9 with active bleeding we will administer PCC at this time    Anemia- (present on admission)  Assessment & Plan  Transfuse hemoglobin less than 7        Advance care planning- (present on admission)  Assessment & Plan    CODE STATUS obtained at bedside and patient wishes for DO NOT RESUSCITATE DO NOT INTUBATE CODE STATUS with nurse present/intensivist present and myself and she is alert and oriented x4.    Prolonged Q-T interval on ECG- (present on admission)  Assessment & Plan  Avoid QTC prolonging agents  Ativan for nausea vomiting    Thrombocytopenia (HCC)- (present on admission)  Assessment & Plan  Platelet admission secondary to aspirin use  Current bleeding has stopped, monitor  Consider platelet transfusion if rebleeding      Severe protein-calorie malnutrition (HCC)- (present on admission)  Assessment & Plan  Dietary consult    Dilated cardiomyopathy (HCC)- (present on admission)  Assessment & Plan  Likely from longstanding methamphetamine abuse/alcoholism leading to her dilated cardiomyopathy.  Last ejection fraction 15% given 2 L in ED  Resume medication regimen once the patient tolerates    Macrocytosis- (present on admission)  Assessment & Plan  Secondary to longstanding alcoholism      Methamphetamine abuse (HCC)- (present on  admission)  Assessment & Plan  Urine drug test ordered and pending  Dilated cardiomyopathy appreciated in history and last echocardiogram ejection fraction 15%    Liver cirrhosis (HCC)- (present on admission)  Assessment & Plan  With elevated bilirubin, longstanding alcohol history       Medically complex high risk patient    VTE prophylaxis: pharmacologic prophylaxis contraindicated due to Bleeding    I have performed a physical exam and reviewed and updated ROS and Plan today (5/17/2022). In review of yesterday's note (5/16/2022), there are no changes except as documented above.

## 2022-05-17 NOTE — CARE PLAN
The patient is Stable - Low risk of patient condition declining or worsening    Shift Goals  Clinical Goals: IV antibiotics  Patient Goals: Rest  Family Goals: MORALES. No family present.      Problem: Knowledge Deficit - Standard  Goal: Patient and family/care givers will demonstrate understanding of plan of care, disease process/condition, diagnostic tests and medications  Outcome: Progressing     Problem: Pain - Standard  Goal: Alleviation of pain or a reduction in pain to the patient’s comfort goal  Outcome: Progressing

## 2022-05-17 NOTE — DISCHARGE PLANNING
Per rounds: Pt. Pending kidney biopsy today, continue diuresis. Possibly needing FWW. Pt. lives with brother.

## 2022-05-17 NOTE — PROGRESS NOTES
Received report from NOC shift RN. Patient is A&Ox4, no complaints of pain at this time, VSS, no signs of distress. All questions and concerns answered, call light in reach, will continue to monitor.

## 2022-05-17 NOTE — DISCHARGE PLANNING
Case Management Discharge Planning    Admission Date: 5/14/2022  GMLOS: 2.1  ALOS: 3    6-Clicks ADL Score: 14  6-Clicks Mobility Score: 13  PT and/or OT Eval ordered: Yes  Post-acute Referrals Ordered: Yes  Post-acute Choice Obtained: YES  Has referral(s) been sent to post-acute provider:  Yes      Anticipated Discharge Dispo: Discharge Disposition: D/T to SNF with Medicare cert in anticipation of skilled care (03)    DME Needed: No    Action(s) Taken: Choice obtained    Escalations Completed: None    Medically Clear: No    Next Steps: Per rounds: Pt. To have palliative consult to discuss options due to liver failure. Palliative consult order pending.    Barriers to Discharge: Medical clearance and Pending Placement    Is the patient up for discharge tomorrow: No

## 2022-05-17 NOTE — PROGRESS NOTES
[facility] Medicine Daily Progress Note    Date of Service  5/16/2022    Chief Complaint  Maral Herrera is a 56 y.o. female admitted 5/14/2022 with severe epistaxis for 3 days    Hospital Course  This is a 56-year-old female with a history of a brain tumor, alcohol use, methamphetamine use, severe dilated cardiomyopathy presumed from methamphetamine use, residing at [facility], had a reported ground-level fall approximately a month ago with periorbital swelling and maxillary and sphenoid ecchymosis, the patient is on aspirin as well as Xarelto at the nursing facility and develops a significant episode of epistaxis.  The patient has a cardiomyopathy with ejection fraction of 15%.  The patient was seen in this facility at the beginning of April, cardiology was consulted, the patient at the time positive for methamphetamine.  The patient has a history of longstanding alcohol abuse.  She does have cirrhosis and reportedly has a TIPS in place.  The patient was on Xarelto what appears to be for DVT prophylaxis at the nursing facility as well as aspirin for her heart condition.  The patient had reversal of her anticoagulation with Kcentra.  I had a conversation with the patient's mother today who gave additional history.  Apparently she had been diagnosed with a brain tumor and during the surgery the patient had severe bleeding and the procedure was aborted.  A recent CT scan of the brain shows encephalomalacia but no definitive tumor residual.  Patient is found to have a UTI, she is febrile and was started on empiric antibiotics with cultures pending.    Interval Problem Update  Patient seen and examined today.  Data, Medication data reviewed.  Case discussed with nursing as available.  Plan of Care reviewed with patient and notified of changes.  5/15, the patient is complaining of achiness, pain in her legs and arms, she is unwell, currently no further nosebleed noted.  T-max 100.3, heart rate in the  90s, respiration unlabored, 4 L nasal cannula oxygen, blood pressure initially down to 70/50, currently 104 with 60, given limited amount of fluid resuscitation secondary to the patient's poor ejection fraction.  Hemoglobin had been stable, the patient with significant hyponatremia, prerenal azotemia, total bilirubin of 4.6, albumin 2.1.  No iron deficiency identified, the patient severely coagulopathic initially elevated INR of 9.32, her TEG showed a platelet inhibition also, elevated procalcitonin,  Chest x-ray without cardiopulmonary disease, cardiomegaly is noted  5/16/2022:  Continue present medical management continue with ceftriaxone patient does have positive blood culture bottles gram-negative rods in addition to urinary culture is also positive for E. coli 10-50,000 colonies.  Repeat labs in the morning.  Patient with a MELD score of approximately 27.  Prognosis extremely guarded.  I have personally seen and examined the patient at bedside. I discussed the plan of care with patient.  Patient's mother  Consultants/Specialty  critical care    Code Status  DNAR/DNI    Disposition  Patient is not medically cleared for discharge.   Anticipate discharge to to skilled nursing facility.  I have placed the appropriate orders for post-discharge needs.    Review of Systems  Review of Systems   Constitutional: Positive for fever and malaise/fatigue. Negative for chills.   HENT: Positive for nosebleeds.    Eyes: Negative.    Respiratory: Negative.  Negative for cough.    Cardiovascular: Negative.  Negative for chest pain and palpitations.   Gastrointestinal: Negative.  Negative for heartburn, nausea and vomiting.   Genitourinary: Positive for urgency. Negative for dysuria and frequency.   Musculoskeletal: Positive for myalgias. Negative for back pain and neck pain.   Skin: Negative.  Negative for itching and rash.   Neurological: Positive for focal weakness and headaches. Negative for dizziness and weakness.    Endo/Heme/Allergies: Negative.  Negative for polydipsia. Does not bruise/bleed easily.   Psychiatric/Behavioral: Negative for depression. The patient is nervous/anxious.         Physical Exam  Temp:  [36.3 °C (97.4 °F)-38.2 °C (100.7 °F)] 37.6 °C (99.6 °F)  Pulse:  [] 100  Resp:  [16-18] 17  BP: (108-124)/(70-86) 108/80  SpO2:  [92 %-97 %] 95 %    Physical Exam  Vitals and nursing note reviewed.   Constitutional:       Appearance: She is well-developed and overweight. She is ill-appearing. She is not diaphoretic.      Interventions: Nasal cannula in place.   HENT:      Head: Normocephalic and atraumatic.      Comments: Ecchymosis, bruising     Nose: Nose normal.   Eyes:      Conjunctiva/sclera: Conjunctivae normal.      Pupils: Pupils are equal, round, and reactive to light.   Neck:      Thyroid: No thyromegaly.      Vascular: No JVD.   Cardiovascular:      Rate and Rhythm: Normal rate and regular rhythm.      Heart sounds: Normal heart sounds.     No friction rub. No gallop.   Pulmonary:      Effort: Pulmonary effort is normal.      Breath sounds: Normal breath sounds. No wheezing or rales.   Abdominal:      General: Bowel sounds are normal. There is no distension.      Palpations: Abdomen is soft. There is no mass.      Tenderness: There is no abdominal tenderness. There is no guarding or rebound.   Musculoskeletal:         General: Tenderness present. Normal range of motion.      Cervical back: Normal range of motion and neck supple.   Lymphadenopathy:      Cervical: No cervical adenopathy.   Skin:     General: Skin is warm and dry.      Coloration: Skin is jaundiced.      Findings: Bruising, lesion and rash present.   Neurological:      Mental Status: She is oriented to person, place, and time. She is lethargic.      Cranial Nerves: No cranial nerve deficit.   Psychiatric:         Behavior: Behavior normal.         Fluids    Intake/Output Summary (Last 24 hours) at 5/16/2022 7276  Last data filed at  5/16/2022 1707  Gross per 24 hour   Intake 490 ml   Output --   Net 490 ml       Laboratory  Recent Labs     05/14/22  2345 05/15/22  1030 05/16/22  0216   WBC 5.6 6.8 6.5   RBC 3.11* 3.35* 3.28*   HEMOGLOBIN 11.2* 11.9* 11.4*   HEMATOCRIT 32.6* 34.7* 33.5*   .8* 103.6* 102.1*   MCH 36.0* 35.5* 34.8*   MCHC 34.4 34.3 34.0   RDW 65.4* 65.0* 63.7*   PLATELETCT 92* 90* 85*   MPV 11.3 11.0 10.7     Recent Labs     05/14/22  1752 05/14/22  2345 05/16/22  0216   SODIUM 129* 129* 126*   POTASSIUM 4.7 4.5 3.9   CHLORIDE 98 101 97   CO2 20 18* 17*   GLUCOSE 137* 111* 116*   BUN 38* 39* 30*   CREATININE 1.34 1.18 1.00   CALCIUM 8.3* 7.5* 7.5*     Recent Labs     05/14/22  1852 05/16/22  0216   APTT 68.8*  --    INR 9.32* 2.78*               Imaging  DX-CHEST-PORTABLE (1 VIEW)   Final Result         1.  No acute cardiopulmonary disease.   2.  Cardiomegaly   3.  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 coli 10-50,000 colonies in addition patient does have positive blood culture bottle for gram-negative rods.  Continue ceftriaxone 1 g continue fluid resuscitation as needed be mindful of fluid resuscitation the patient has  decreased albumin likely will end up start third spacing.  Patient continues to have elevated INR no overt signs of bleeding.  Patient with MELD score of 27.    Supratherapeutic INR- (present on admission)  Assessment & Plan  Greater than 9 with active bleeding we will administer PCC at this time    Anemia- (present on admission)  Assessment & Plan  Transfuse hemoglobin less than 7        Advance care planning- (present on admission)  Assessment & Plan    CODE STATUS obtained at bedside and patient wishes for DO NOT RESUSCITATE DO NOT INTUBATE CODE STATUS with nurse present/intensivist present and myself and she is alert and oriented x4.    Prolonged Q-T interval on ECG- (present on admission)  Assessment & Plan  Avoid QTC prolonging agents  Ativan for nausea vomiting    Thrombocytopenia (HCC)- (present on admission)  Assessment & Plan  Platelet admission secondary to aspirin use  Current bleeding has stopped, monitor  Consider platelet transfusion if rebleeding      Severe protein-calorie malnutrition (HCC)- (present on admission)  Assessment & Plan  Dietary consult    Dilated cardiomyopathy (HCC)- (present on admission)  Assessment & Plan  Likely from longstanding methamphetamine abuse/alcoholism leading to her dilated cardiomyopathy.  Last ejection fraction 15% given 2 L in ED  Resume medication regimen once the patient tolerates    Macrocytosis- (present on admission)  Assessment & Plan  Secondary to longstanding alcoholism      Methamphetamine abuse (HCC)- (present on admission)  Assessment & Plan  Urine drug test ordered and pending  Dilated cardiomyopathy appreciated in history and last echocardiogram ejection fraction 15%    Liver cirrhosis (HCC)- (present on admission)  Assessment & Plan  With elevated bilirubin, longstanding alcohol history     Plan  Empiric antibiotics for UTI  Resume cardiac medication as tolerated, with holding of aspirin and any other anticoagulation for the time being in light of the  patient's nosebleed  Nasal saline  Supportive care  SCDs  Close laboratory follow-up in terms of correction of anticoagulation and other metabolic and blood count derangements  See orders  Medically complex high risk patient    VTE prophylaxis: pharmacologic prophylaxis contraindicated due to Bleeding    I have performed a physical exam and reviewed and updated ROS and Plan today (5/16/2022). In review of yesterday's note (5/15/2022), there are no changes except as documented above.      Please note that this dictation was created using voice recognition software. I have made every reasonable attempt to correct obvious errors, but I expect that there are errors of grammar and possibly context that I did not discover before finalizing the note.

## 2022-05-17 NOTE — CARE PLAN
The patient is Stable - Low risk of patient condition declining or worsening    Shift Goals  Clinical Goals: Maintain safety and prevent falls, increase mobility, wean off supplemental o2, imrpove fluid balance and normalize diagnostic labs  Patient Goals: Rest  Family Goals: MORALES. No family present.    Progress made toward(s) clinical / shift goals:      Problem: Knowledge Deficit - Standard  Goal: Patient and family/care givers will demonstrate understanding of plan of care, disease process/condition, diagnostic tests and medications  Outcome: Progressing     Problem: Pain - Standard  Goal: Alleviation of pain or a reduction in pain to the patient’s comfort goal  Outcome: Progressing     Problem: Skin Integrity  Goal: Skin integrity is maintained or improved  Outcome: Progressing     Problem: Fall Risk  Goal: Patient will remain free from falls  Outcome: Progressing       Patient is not progressing towards the following goals: NA

## 2022-05-17 NOTE — THERAPY
"Occupational Therapy   Initial Evaluation     Patient Name: Maral Herrera  Age:  56 y.o., Sex:  female  Medical Record #: [id number]  Today's Date: 5/17/2022     Precautions  Precautions: (P) Fall Risk  Comments: (P) hx R deficits (CVA vs brain tumor)    Assessment  Patient is 56 y.o. female who presents to acute for severe epistaxis for 2 days. PMH includes hx of brain tumor w/ residual R sided deficits, alcohol use, methamphetamine use, severe dilated cardiomyopathy, who was at SNF prior to admission. Pt required min A for toilet hygiene and standing grooming, Mod A for toilet txf. Pt demo'd impaired balance, functional mobility, and activity tolerance impacting functional independence. Will continue to follow while in house.     Plan    Recommend Occupational Therapy 3 times per week until therapy goals are met for the following treatments:  Adaptive Equipment, Neuro Re-Education / Balance, Self Care/Activities of Daily Living, Therapeutic Activities, and Therapeutic Exercises.    DC Equipment Recommendations: (P) Unable to determine at this time  Discharge Recommendations: (P) Recommend post-acute placement for additional occupational therapy services prior to discharge home     Subjective    \"I just want to eat my breakfast and wake up\"     Objective       05/17/22 0901   Charge Group   OT Evaluation OT Evaluation Mod   Total Time Spent   OT Time Spent Yes   OT Evaluation (Minutes) 30   Initial Contact Note    Initial Contact Note Order Received and Verified, Occupational Therapy Evaluation in Progress with Full Report to Follow.   Prior Living Situation   Prior Services Intermittent Physical Support for ADL Per Family   Housing / Facility Motel   Bathroom Set up Bathtub / Shower Combination;Shower Chair   Equipment Owned Wheelchair;Grzegorz Walker;Power Wheel Chair   Lives with - Patient's Self Care Capacity Friends   Comments 2 roommates who can assists PRN, was recently at [facility] prior to getting txf'd here. "   Prior Level of ADL Function   Self Feeding Independent   Grooming / Hygiene Independent   Bathing Requires Assist   Dressing Requires Assist   Toileting Independent   Prior Level of IADL Function   Medication Management Requires Assist   Laundry Dependent   Kitchen Mobility Requires Assist   Finances Requires Assist   Home Management Dependent   Shopping Dependent   Prior Level Of Mobility Supervision With Device in Home   Driving / Transportation Utilizes Public Transportation   Precautions   Precautions Fall Risk   Comments hx R deficits (CVA vs brain tumor)   Vitals   O2 (LPM) 0   O2 Delivery Device None - Room Air   Cognition    Cognition / Consciousness X   Level of Consciousness Alert   Safety Awareness Impaired   New Learning Impaired   Attention Impaired   Sequencing Impaired   Comments initially irritable, found soiled, required encouragement to participate. Poor insight into deficits   Active ROM Upper Body   Comments R UE deficits at baseline, UE held in flexor tone, able to break w/ concentration   Strength Upper Body   Comments RUE grossly impaired at baseline   Balance Assessment   Sitting Balance (Static) Fair +   Sitting Balance (Dynamic) Fair   Standing Balance (Static) Fair -   Standing Balance (Dynamic) Poor +   Weight Shift Sitting Fair   Weight Shift Standing Poor   Comments w/ HHA   Bed Mobility    Supine to Sit Supervised   Sit to Supine Minimal Assist  (for B LE's)   Scooting Supervised   ADL Assessment   Eating   (set up- required cointainers opened)   Grooming Minimal Assist;Standing  (washed L hand)   Lower Body Dressing Maximal Assist   Toileting Minimal Assist  (pericare on toilet, A for safety and balance)   How much help from another person does the patient currently need...   Putting on and taking off regular lower body clothing? 2   Bathing (including washing, rinsing, and drying)? 2   Toileting, which includes using a toilet, bedpan, or urinal? 3   Putting on and taking off  regular upper body clothing? 3   Taking care of personal grooming such as brushing teeth? 3   Eating meals? 3   6 Clicks Daily Activity Score 16   Functional Mobility   Sit to Stand Minimal Assist   Toilet Transfers Moderate Assist   Transfer Method Stand Step   Mobility w/ HHA to bathroom   Patient / Family Goals   Patient / Family Goal #1 to get stronger   Short Term Goals   Short Term Goal # 1 Pt will demo LB dressing w/ min A   Short Term Goal # 2 Pt will demo toilet txf w/ CGA   Short Term Goal # 3 Pt will demo standing grooming w/ SPV   Education Group   Role of Occupational Therapist Patient Response Patient;Acceptance;Explanation;Verbal Demonstration;Reinforcement Needed   Problem List   Problem List Decreased Active Daily Living Skills;Decreased Homemaking Skills;Decreased Upper Extremity Strength Right;Decreased Upper Extremity Strength Left;Decreased Functional Mobility;Decreased Activity Tolerance;Decreased Upper Extremity AROM Right;Safety Awareness Deficits / Cognition;Impaired Coordination Right Upper Extremity;Impaired Cognitive Function;Impaired Postural Control / Balance;Impaired Upper Extremity Tone Right   Anticipated Discharge Equipment and Recommendations   DC Equipment Recommendations Unable to determine at this time   Discharge Recommendations Recommend post-acute placement for additional occupational therapy services prior to discharge home   Interdisciplinary Plan of Care Collaboration   IDT Collaboration with  Nursing;Physical Therapist   Patient Position at End of Therapy In Bed;Bed Alarm On;Call Light within Reach;Tray Table within Reach;Phone within Reach   Collaboration Comments report given   Session Information   Date / Session Number  5/17, 1 (1/3, 5/23)   Priority 2

## 2022-05-17 NOTE — DISCHARGE PLANNING
Pt. Has requested not to share information with her mother. It is in the sticky note on summary. Her mother called this morning to have nurse tell the CM that she did not want her daughter to go [facility]. Her daughter is decisional and all choices should be asked of patient.

## 2022-05-17 NOTE — THERAPY
Physical Therapy   Initial Evaluation     Patient Name: Maral Herrera  Age:  56 y.o., Sex:  female  Medical Record #: [id number]  Today's Date: 5/17/2022     Precautions: Fall Risk  Comments: hx R sided deficits    Assessment  Patient is a 56 y.o. female admitted from SNF with epistaxis, hypotension, and UTI. Pt was admitted 4/10 - 4/13 with SOB prior to snf. Had a GLF about a month ago. Pmhx includes alcohol and meth use, cardiomyopathy and EF 15%, brain tumor vs CVA with residual R deficits (conflicting info in chart). Pt seen for PT evaluation at this time. Pt demo'd short distance ambulation with HHA and min A, required min-mod A for STS transfers. Pt will benefit from continued PT to progress mobility and return to snf prior to return home to maximize safety and functional independence. PT will follow.     Plan  Recommend Physical Therapy 3 times per week until therapy goals are met for the following treatments:  Bed Mobility, Gait Training, Neuro Re-Education / Balance, Self Care/Home Evaluation, Stair Training, Therapeutic Activities, and Therapeutic Exercises  DC Equipment Recommendations: Unable to determine at this time  Discharge Recommendations: Recommend post-acute placement for additional physical therapy services prior to discharge home          05/17/22 0859   Prior Living Situation   Housing / Facility Motel   Steps Into Home 2 FOS to third floor   Steps In Home 0   Elevator No (elevator broken)   Bathroom Set up Bathtub / Shower Combination;Shower Chair   Equipment Owned Wheelchair;Grzegorz Walker;Power Wheel Chair   Lives with -  Friends   Comments 2 roomates who could assist as needed. was mosts recently in SNF and transferred here from there.   Prior Level of Functional Mobility   Bed Mobility Independent   Transfer Status Independent   Ambulation Independent   Distance Ambulation (Feet) household   Assistive Devices Used Grzegorz Walker (reports uses furniture and walls mostly)   Wheelchair Independent    Stairs Required Assist   Comments reports was ambulating short distances with hemiwalker with assist while at snf   Cognition    Level of Consciousness Alert   Comments irritable initially and required encouragement and edu to participate. more appreciative at end of session. does demo decr safety awareness with gait; initially asking for FWW but pushing it aside and holding walls, somewhat impulsive   Balance Assessment   Sitting Balance (Static) Fair +   Sitting Balance (Dynamic) Fair   Standing Balance (Static) Fair -   Standing Balance (Dynamic) Poor +   Weight Shift Sitting Fair   Weight Shift Standing Poor   Comments standing with HHA   Gait Analysis   Gait Level Of Assist Minimal Assist   Assistive Device Hand Held Assist   Distance (Feet) 20   # of Times Distance Traveled 2   Deviation Step To;Decreased Base Of Support;Bradykinetic;Decreased Heel Strike;Decreased Toe Off (R hemiparesis and drop foot)   Weight Bearing Status no restrictions   Comments no overt LOB, demo decr awareness of surroundings, used HHA given no hemiwalker at this time but plan to use next session.   Bed Mobility    Supine to Sit Supervised   Sit to Supine Minimal Assist   Scooting Supervised   Comments difficulty with LEs   Functional Mobility   Sit to Stand Minimal Assist   Toilet Transfers Moderate Assist   Comments poor eccentric control and awareness of safe sitting   Short Term Goals    Short Term Goal # 1 pt will perform supine <> sit with SPV in 6 visits to get in/out of bed at home   Short Term Goal # 2 pt will perform all functional xfrs with SPV in 6 visits for improved independence   Short Term Goal # 3 pt will ambulate 150ft with hemiwalker and SPV in 6 visits to access home environment   Short Term Goal # 4 pt will negotiate FOS with CGA in 6 visits to access home with assist from RM

## 2022-05-17 NOTE — DIETARY
Nutrition services: Day 3 of admit.  Maral Herrera is a 56 y.o. female with admitting DX of Epistaxis  Consult received for Severe Protein calorie malnutrition.       Assessment:  Weight: 73.3 kg (161 lb 9.6 oz)  Body mass index is 26.08 kg/m²., BMI classification: Overweight  Diet/Intake: Cardiac w/ 1500 mL fluid restriction.    Evaluation:   1. Spoke w/ Pt at bedside. Pt reports weight loss but contributes weight loss to previous edema. Pt was hospitalized last month and Pt states she was incredibly swollen. Per chart review Pt weighted 201 lbs on 4/11/22 and had 3+ pitting edema during previous hospitalization. Pt states she was placed on lasix and shed fluid. Weight loss most likely related to fluid accumulation. Per EMR, Pt has 2+ edema on RUE and RLE.   2. Pt denies having any appetite changes. PO 25-50 x 2, 50-75% x 1, and <25% x 1.  3. Pt looked adequately nourished for age.  4. Labs: Na 126 (H). Glucose (H). Bun (H). Mag 1.3 (L).  5. Meds: Lasix. BM protocol.  6. Last BM: PTA.    Malnutrition Risk: Does not meet criteria.    Recommendations/Plan:   1. Magic Cups BID to add extra nutritional content.   2. Encourage intake of all meals and supplements  3. Document intake of all meals and supplements as % taken in ADL's to provide interdisciplinary communication across all shifts.   4. Monitor weight.  5. Nutrition rep will continue to see patient for ongoing meal and snack preferences.     RD following.

## 2022-05-17 NOTE — PROGRESS NOTES
Bedside report received and patient care assumed. Pt is resting in bed, A&Ox4, with no complaints of pain, and is on 3L NC. Tele box on. All fall precautions are in place, belongings at bedside table.  Pt was updated on POC, no questions or concerns. Pt educated on use of call light for assistance.

## 2022-05-17 NOTE — DISCHARGE PLANNING
Agency/Facility Name: [facility]   Outcome: [facility] left Vmail to [facility]PA requesting PT/OT notes in order to review referral.

## 2022-05-17 NOTE — DOCUMENTATION QUERY
[facility]                                                                       Query Response Note      PATIENT:               VALENTÍN MILAN  ACCT #:                  [id number]  MRN:                     [id number]  :                      1965  ADMIT DATE:       2022 5:21 PM  DISCH DATE:          RESPONDING  PROVIDER #:        [id number]           QUERY TEXT:    Fluid responsive shock is documented in the H&P.  Considering the clinical indicators and additional documentation, can the type of shock be further specified?    NOTE:  If an appropriate response is not listed below, please respond with a new note.    The patient's Clinical Indicators include:  Fluid responsive shock in ED with 2L of crystalloid fluid   BP: 70s-90s/50s; HR: 90s - 100s;   INR: 9.32 - 2.78  Epistaxis/Afrin/Neosynephrine/ IVF Boluses     Xarelto/ASA Administer Kcentra   Thrombocytopenia  Liver cirrhosis     Thank you,  Tere Sauceda RN, CCS  Clinical Documentation Integrity  Connect via Vo[employer] Messenger  Options provided:   -- Hypovolemic shock   -- Hemorrhagic  shock   -- Other shock, (Pls specify)   -- Unable to determine      Query created by: Tere Sauceda on 2022 11:32 AM    RESPONSE TEXT:    Hypovolemic shock          Electronically signed by:  NERY CHAVEZ MD 2022 5:14 AM

## 2022-05-18 LAB
ALBUMIN SERPL BCP-MCNC: 2 G/DL (ref 3.2–4.9)
ALBUMIN/GLOB SERPL: 0.5 G/DL
ALP SERPL-CCNC: 130 U/L (ref 30–99)
ALT SERPL-CCNC: 34 U/L (ref 2–50)
ANION GAP SERPL CALC-SCNC: 7 MMOL/L (ref 7–16)
AST SERPL-CCNC: 85 U/L (ref 12–45)
BACTERIA BLD CULT: ABNORMAL
BACTERIA BLD CULT: ABNORMAL
BILIRUB SERPL-MCNC: 3.3 MG/DL (ref 0.1–1.5)
BUN SERPL-MCNC: 18 MG/DL (ref 8–22)
CALCIUM SERPL-MCNC: 7.6 MG/DL (ref 8.5–10.5)
CHLORIDE SERPL-SCNC: 96 MMOL/L (ref 96–112)
CO2 SERPL-SCNC: 22 MMOL/L (ref 20–33)
CREAT SERPL-MCNC: 0.6 MG/DL (ref 0.5–1.4)
ERYTHROCYTE [DISTWIDTH] IN BLOOD BY AUTOMATED COUNT: 62.3 FL (ref 35.9–50)
GFR SERPLBLD CREATININE-BSD FMLA CKD-EPI: 104 ML/MIN/1.73 M 2
GLOBULIN SER CALC-MCNC: 3.7 G/DL (ref 1.9–3.5)
GLUCOSE SERPL-MCNC: 138 MG/DL (ref 65–99)
HCT VFR BLD AUTO: 32.4 % (ref 37–47)
HGB BLD-MCNC: 11.1 G/DL (ref 12–16)
MCH RBC QN AUTO: 35.6 PG (ref 27–33)
MCHC RBC AUTO-ENTMCNC: 34.3 G/DL (ref 33.6–35)
MCV RBC AUTO: 103.8 FL (ref 81.4–97.8)
PLATELET # BLD AUTO: 86 K/UL (ref 164–446)
PMV BLD AUTO: 10.6 FL (ref 9–12.9)
POTASSIUM SERPL-SCNC: 4.1 MMOL/L (ref 3.6–5.5)
PROT SERPL-MCNC: 5.7 G/DL (ref 6–8.2)
RBC # BLD AUTO: 3.12 M/UL (ref 4.2–5.4)
SIGNIFICANT IND 70042: ABNORMAL
SITE SITE: ABNORMAL
SODIUM SERPL-SCNC: 125 MMOL/L (ref 135–145)
SOURCE SOURCE: ABNORMAL
WBC # BLD AUTO: 6.1 K/UL (ref 4.8–10.8)

## 2022-05-18 PROCEDURE — 99497 ADVNCD CARE PLAN 30 MIN: CPT | Performed by: NURSE PRACTITIONER

## 2022-05-18 PROCEDURE — 700102 HCHG RX REV CODE 250 W/ 637 OVERRIDE(OP): Performed by: STUDENT IN AN ORGANIZED HEALTH CARE EDUCATION/TRAINING PROGRAM

## 2022-05-18 PROCEDURE — 700111 HCHG RX REV CODE 636 W/ 250 OVERRIDE (IP): Performed by: INTERNAL MEDICINE

## 2022-05-18 PROCEDURE — 770020 HCHG ROOM/CARE - TELE (206)

## 2022-05-18 PROCEDURE — 99498 ADVNCD CARE PLAN ADDL 30 MIN: CPT | Performed by: NURSE PRACTITIONER

## 2022-05-18 PROCEDURE — 85027 COMPLETE CBC AUTOMATED: CPT

## 2022-05-18 PROCEDURE — A9270 NON-COVERED ITEM OR SERVICE: HCPCS | Performed by: STUDENT IN AN ORGANIZED HEALTH CARE EDUCATION/TRAINING PROGRAM

## 2022-05-18 PROCEDURE — 700105 HCHG RX REV CODE 258: Performed by: INTERNAL MEDICINE

## 2022-05-18 PROCEDURE — 80053 COMPREHEN METABOLIC PANEL: CPT

## 2022-05-18 PROCEDURE — 99223 1ST HOSP IP/OBS HIGH 75: CPT | Performed by: INTERNAL MEDICINE

## 2022-05-18 PROCEDURE — 36415 COLL VENOUS BLD VENIPUNCTURE: CPT

## 2022-05-18 PROCEDURE — 99232 SBSQ HOSP IP/OBS MODERATE 35: CPT | Performed by: INTERNAL MEDICINE

## 2022-05-18 RX ADMIN — MEROPENEM 500 MG: 500 INJECTION, POWDER, FOR SOLUTION INTRAVENOUS at 11:30

## 2022-05-18 RX ADMIN — FUROSEMIDE 20 MG: 20 TABLET ORAL at 06:28

## 2022-05-18 RX ADMIN — TIOTROPIUM BROMIDE INHALATION SPRAY 5 MCG: 3.12 SPRAY, METERED RESPIRATORY (INHALATION) at 06:28

## 2022-05-18 RX ADMIN — Medication 5 MG: at 20:00

## 2022-05-18 RX ADMIN — BUDESONIDE AND FORMOTEROL FUMARATE DIHYDRATE 2 PUFF: 80; 4.5 AEROSOL RESPIRATORY (INHALATION) at 17:26

## 2022-05-18 RX ADMIN — MEROPENEM 500 MG: 500 INJECTION, POWDER, FOR SOLUTION INTRAVENOUS at 06:28

## 2022-05-18 RX ADMIN — MEROPENEM 500 MG: 500 INJECTION, POWDER, FOR SOLUTION INTRAVENOUS at 23:43

## 2022-05-18 RX ADMIN — MEROPENEM 500 MG: 500 INJECTION, POWDER, FOR SOLUTION INTRAVENOUS at 17:26

## 2022-05-18 RX ADMIN — ATORVASTATIN CALCIUM 20 MG: 20 TABLET, FILM COATED ORAL at 17:26

## 2022-05-18 RX ADMIN — SENNOSIDES AND DOCUSATE SODIUM 2 TABLET: 50; 8.6 TABLET ORAL at 06:28

## 2022-05-18 RX ADMIN — OXYCODONE 5 MG: 5 TABLET ORAL at 18:50

## 2022-05-18 RX ADMIN — BUDESONIDE AND FORMOTEROL FUMARATE DIHYDRATE 2 PUFF: 80; 4.5 AEROSOL RESPIRATORY (INHALATION) at 06:28

## 2022-05-18 ASSESSMENT — PAIN DESCRIPTION - PAIN TYPE
TYPE: ACUTE PAIN

## 2022-05-18 ASSESSMENT — ENCOUNTER SYMPTOMS
COUGH: 0
EYES NEGATIVE: 1
HEARTBURN: 0
CARDIOVASCULAR NEGATIVE: 1
NECK PAIN: 0
VOMITING: 0
NERVOUS/ANXIOUS: 1
NAUSEA: 0
PALPITATIONS: 0
POLYDIPSIA: 0
DEPRESSION: 0
BACK PAIN: 0
FEVER: 1
BRUISES/BLEEDS EASILY: 0
WEAKNESS: 0
FOCAL WEAKNESS: 1
HEADACHES: 1
GASTROINTESTINAL NEGATIVE: 1
DIZZINESS: 0
RESPIRATORY NEGATIVE: 1
MYALGIAS: 1
CHILLS: 0

## 2022-05-18 ASSESSMENT — FIBROSIS 4 INDEX: FIB4 SCORE: 9.66

## 2022-05-18 NOTE — CARE PLAN
The patient is Watcher - Medium risk of patient condition declining or worsening    Shift Goals  Clinical Goals: Monitor labs, promote rest and healing, stable VS  Patient Goals: rest, pain ontrol  Family Goals: MORALES. No family present.    Progress made toward(s) clinical / shift goals:    Problem: Knowledge Deficit - Standard  Goal: Patient and family/care givers will demonstrate understanding of plan of care, disease process/condition, diagnostic tests and medications  Outcome: Progressing     Problem: Pain - Standard  Goal: Alleviation of pain or a reduction in pain to the patient’s comfort goal  Outcome: Progressing     Problem: Fall Risk  Goal: Patient will remain free from falls  Outcome: Progressing       Patient is not progressing towards the following goals: N/A

## 2022-05-18 NOTE — PROGRESS NOTES
Received report and assumed care of patient. Patient is alert and oriented x4. Patient is in no signs of distress. Patient was updated on the plan of care for the day. Call light within reach, bed in low position, 2 side rails up. All fall precautions in place. Will continue to monitor.

## 2022-05-18 NOTE — PROGRESS NOTES
Received bedside report from NOC RN. Pt is A&Ox4. Pt is resting in bed, no signs of labored breathing or pain. Denies CP/SOB. Pt on RA. Call light & personal belongings within reach, bed in lowest position & locked. Fall precautions in place and education provided on how to use call light, hourly rounding in place. Educated on calling before getting OOB. Pt updated on plan of care for the shift. Pt declines any additional needs at this time.

## 2022-05-18 NOTE — CARE PLAN
The patient is Stable - Low risk of patient condition declining or worsening    Shift Goals  Clinical Goals: monitor labs, sfaety, IV abx, skin integrity  Patient Goals: rest, pain ontrol  Family Goals: MORALES. No family present.    Progress made toward(s) clinical / shift goals:    Problem: Knowledge Deficit - Standard  Goal: Patient and family/care givers will demonstrate understanding of plan of care, disease process/condition, diagnostic tests and medications  Outcome: Progressing     Problem: Pain - Standard  Goal: Alleviation of pain or a reduction in pain to the patient’s comfort goal  Outcome: Progressing     Problem: Skin Integrity  Goal: Skin integrity is maintained or improved  Outcome: Progressing     Problem: Fall Risk  Goal: Patient will remain free from falls  Outcome: Progressing       Patient is not progressing towards the following goals:

## 2022-05-18 NOTE — CONSULTS
[facility] INFECTIOUS DISEASES INPATIENT CONSULT NOTE     Date of Service: 5/18/2022    Consult Requested By: Yolie Alicea M.D.    Reason for Consultation: Bacteremia    Chief Complaint: Nosebleed    History of Present Illness:     Maral Herrera is a 56 y.o. female admitted 5/14/2022.  Patient with reported history of a brain tumor, methamphetamine abuse, alcohol abuse, severe dilated cardiomyopathy, residing at [facility], ground-level fall about a month prior with periorbital swelling and maxillary and sphenoid ecchymosis, cirrhosis status post TIPS, on chronic anticoagulation with Xarelto and aspirin, presented to the hospital on 5/14 with severe epistaxis for 3 days.  Reversal of anticoagulation was done with Kcentra.  Patient also reported achiness pain in legs and arms.  Patient was febrile to T-max of 100.7, UA with packed white cells, no leukocytosis, elevated procalcitonin.  Patient was initially started on ceftriaxone.  1 out of 2 blood cultures from 5/15 positive for ESBL E. coli, Bactrim susceptible.  Urine culture from 5/15 also positive for the same organism.  Repeat blood cultures x2 from 5/17 no growth to date.  Patient was switched to meropenem on 5/17.  ID consulted for recommendations.  Chest x-ray with no acute abnormalities.    Patient notes no urinary symptoms currently or prior to admission.  She notes a new acute onset mid back pain as well as some soreness bilateral inner thighs    Review of Systems:  All other systems reviewed and are negative expect as noted in HPI    Past Medical History:   Diagnosis Date   • Brain tumor (HCC) 2012   • Cirrhosis (HCC)    • Congestive heart failure (HCC)    • Thrombocytopenia (HCC)        Past Surgical History:   Procedure Laterality Date   • CRANIOTOMY TUMOR  2012       Family History   Problem Relation Age of Onset   • Heart Disease Mother    • Diabetes Mother    • Heart Disease Father        Social History     Socioeconomic History   • Marital status:       Spouse name: Not on file   • Number of children: Not on file   • Years of education: Not on file   • Highest education level: Not on file   Occupational History   • Not on file   Tobacco Use   • Smoking status: Former Smoker   • Smokeless tobacco: Never Used   Vaping Use   • Vaping Use: Never used   Substance and Sexual Activity   • Alcohol use: Yes     Comment: rarely   • Drug use: Yes     Comment: edibles, meth   • Sexual activity: Not on file   Other Topics Concern   • Not on file   Social History Narrative   • Not on file     Social Determinants of Health     Financial Resource Strain: Not on file   Food Insecurity: Not on file   Transportation Needs: Not on file   Physical Activity: Not on file   Stress: Not on file   Social Connections: Not on file   Intimate Partner Violence: Not on file   Housing Stability: Not on file       No Known Allergies    Medications:    Current Facility-Administered Medications:   •  meropenem (Merrem) 500 mg in  mL IV-MBP, 500 mg, Intravenous, Q6HRS, Marquez Hernandez M.D., Stopped at 05/18/22 0658  •  melatonin tablet 5 mg, 5 mg, Oral, Nightly, Alberto Linda M.D., 5 mg at 05/17/22 2037  •  sodium chloride (OCEAN) 0.65 % nasal spray 2 Spray, 2 Spray, Nasal, Q2HRS PRN, Carl Balbuena M.D.  •  atorvastatin (LIPITOR) tablet 20 mg, 20 mg, Oral, Q EVENING, Santos Tucker D.O., 20 mg at 05/17/22 1717  •  budesonide-formoterol (SYMBICORT) 80-4.5 MCG/ACT inhaler 2 Puff, 2 Puff, Inhalation, BID, Santos Tucker D.O., 2 Puff at 05/18/22 0628  •  [Held by provider] lisinopril (PRINIVIL) tablet 5 mg, 5 mg, Oral, DAILY, Santos Tucker D.O.  •  furosemide (LASIX) tablet 20 mg, 20 mg, Oral, DAILY, Santos Tucker D.O., 20 mg at 05/18/22 0628  •  metoprolol SR (TOPROL XL) tablet 25 mg, 25 mg, Oral, DAILY, Santos Tucker D.O., 25 mg at 05/17/22 0524  •  [Held by provider] spironolactone (ALDACTONE) tablet 25 mg, 25 mg, Oral, DAILY, Santos Tucker D.O.  •   tiotropium (Spiriva Respimat) 2.5 mcg/Act inhalation spray 5 mcg, 5 mcg, Inhalation, DAILY, AKSHAT Vazquez.O., 5 mcg at 05/18/22 0628  •  senna-docusate (PERICOLACE or SENOKOT S) 8.6-50 MG per tablet 2 Tablet, 2 Tablet, Oral, BID, 2 Tablet at 05/18/22 0628 **AND** polyethylene glycol/lytes (MIRALAX) PACKET 1 Packet, 1 Packet, Oral, QDAY PRN **AND** magnesium hydroxide (MILK OF MAGNESIA) suspension 30 mL, 30 mL, Oral, QDAY PRN **AND** bisacodyl (DULCOLAX) suppository 10 mg, 10 mg, Rectal, QDAY PRN, AKSHAT Vazquez.O.  •  acetaminophen (Tylenol) tablet 650 mg, 650 mg, Oral, Q6HRS PRN, AKSHAT Vazquez.O.  •  Notify provider if pain remains uncontrolled, , , CONTINUOUS **AND** Use the Numeric Rating Scale (NRS), Sanchez-Baker Faces (WBF), or FLACC on regular floors and Critical-Care Pain Observation Tool (CPOT) on ICUs/Trauma to assess pain, , , CONTINUOUS **AND** Pulse Ox, , , CONTINUOUS **AND** Pharmacy Consult Request ...Pain Management Review 1 Each, 1 Each, Other, PHARMACY TO DOSE **AND** If patient difficult to arouse and/or has respiratory depression (respiratory rate of 10 or less), stop any opiates that are currently infusing and call a Rapid Response., , , CONTINUOUS, Santos Tucker D.VIKRAM.  •  oxyCODONE immediate-release (ROXICODONE) tablet 2.5 mg, 2.5 mg, Oral, Q3HRS PRN, 2.5 mg at 05/17/22 2329 **OR** oxyCODONE immediate-release (ROXICODONE) tablet 5 mg, 5 mg, Oral, Q3HRS PRN, 5 mg at 05/15/22 2154 **OR** HYDROmorphone (Dilaudid) injection 0.25 mg, 0.25 mg, Intravenous, Q3HRS PRN, AKSHAT Vazquez.O.  •  hydrALAZINE (APRESOLINE) injection 10 mg, 10 mg, Intravenous, Q4HRS PRN, Santos Tucker D.O.  •  LORazepam (ATIVAN) injection 0.5 mg, 0.5 mg, Intravenous, Q4HRS PRN, THIEN VazquezODriss, 0.5 mg at 05/17/22 7094    Physical Exam:   Vital Signs: BP (!) 95/62   Pulse 74   Temp 36.2 °C (97.2 °F) (Temporal)   Resp 16   Wt 76.4 kg (168 lb 6.9 oz)   SpO2 96%   BMI 27.19 kg/m²   Temp   Av.8 °C (98.2 °F)  Min: 35.9 °C (96.6 °F)  Max: 38.2 °C (100.7 °F)  Vital signs reviewed  Constitutional: Patient is thin and ill-appearing, not in acute distress  Head: Ecchymoses under the right eye  Eyes: Conjunctivae normal, EOM intact.   Mouth/Throat: Lips without lesions  Neck: Neck supple. No masses/lymphadenopathy  Cardiovascular: Normal rate, regular rhythm, normal S1S2 and intact distal pulses. No murmur, gallop, or friction rub. No pedal edema.  Pulmonary/Chest: No respiratory distress. Unlabored respiratory effort, lungs clear to auscultation. No wheezes or rales.   Abdominal: Soft, non tender. BS + x 4. No masses or hepatosplenomegaly.   Musculoskeletal: No joint tenderness, swelling, erythema, or restriction of motion noted.  Spinous process tenderness mid back  Neurological: Alert and oriented to person, place, and time. No gross cranial nerve deficit. No focal neural deficit noted  Skin: Skin is warm and dry. No rashes or embolic phenomena noted on exposed skin  Psychiatric: Normal mood and affect. Behavior is normal.     LABS:  Recent Labs     05/15/22  1030 22  0216 22   WBC 6.8 6.5 6.1      Recent Labs     05/15/22  1030 22  0216 22   HEMOGLOBIN 11.9* 11.4* 11.1*   HEMATOCRIT 34.7* 33.5* 32.4*   .6* 102.1* 103.8*   MCH 35.5* 34.8* 35.6*   PLATELETCT 90* 85* 86*       Recent Labs     22  0216 22   SODIUM 126* 125*   POTASSIUM 3.9 4.1   CHLORIDE 97 96   CO2 17* 22   CREATININE 1.00 0.60        Recent Labs     226 22  022   ALBUMIN 2.1* 2.0*        MICRO:  Blood Culture Hold   Date Value Ref Range Status   2022 Collected  Final        Latest pertinent labs were reviewed    IMAGING STUDIES:  As above    Hospital Course/Assessment:   Maral Herrera is a 56 y.o. female with reported history of a brain tumor, methamphetamine abuse, alcohol abuse, severe dilated cardiomyopathy, residing at [facility], ground-level fall  about a month prior with periorbital swelling and maxillary and sphenoid ecchymosis, cirrhosis status post TIPS, on chronic anticoagulation with Xarelto and aspirin, presented to the hospital on 5/14 with severe epistaxis for 3 days.  Reversal of anticoagulation was done with Kcentra.  Patient was also noted to be febrile and was found to have both blood and urine cultures positive for ESBL E. coli    Pertinent Diagnoses:  ESBL E. coli bacteremia  Methamphetamine abuse  Severe nonischemic dilated cardiomyopathy  Epistaxis, resolved  Reported history of brain tumor  Alcohol abuse  Acute mid back pain    Plan:   -Continue IV meropenem 500 mg every 6 hours while inpatient.  Uncertain source given absence of UTI symptoms.  Positive urine culture could be spillage from systemic infection  -Given methamphetamine abuse and acute mid back pain, recommend MRI of the T-spine to look for epidural abscess  -Patient noting soreness of bilateral inner thighs, no concerns on exam findings but will need to be monitored and if any worsening may need imaging to look for underlying abscesses    Plan was discussed with the primary team, Dr. Alicea    Please feel free to call with questions.    Marquez Hernandez M.D.    Please note that this dictation was created using voice recognition software. I have worked with technical experts from [facility] to optimize the interface.  I have made every reasonable attempt to correct obvious errors, but there may be errors of grammar and possibly content that I did not discover before finalizing the note.

## 2022-05-18 NOTE — PROGRESS NOTES
MD consulted regarding AM medication administration with lasix and metoprolol with BP trending soft. Per MD, hold metoprolol. Ok to give Lasix.

## 2022-05-18 NOTE — PROGRESS NOTES
[facility] Medicine Daily Progress Note    Date of Service  5/18/2022    Chief Complaint  Maral Herrera is a 56 y.o. female admitted 5/14/2022 with severe epistaxis for 3 days    Hospital Course  This is a 56-year-old female with a history of a brain tumor, alcohol use, methamphetamine use, severe dilated cardiomyopathy presumed from methamphetamine use, residing at [facility], had a reported ground-level fall approximately a month ago with periorbital swelling and maxillary and sphenoid ecchymosis, the patient is on aspirin as well as Xarelto at the nursing facility and develops a significant episode of epistaxis.  The patient has a cardiomyopathy with ejection fraction of 15%.  The patient was seen in this facility at the beginning of April, cardiology was consulted, the patient at the time positive for methamphetamine.  The patient has a history of longstanding alcohol abuse.  She does have cirrhosis and reportedly has a TIPS in place.  The patient was on Xarelto what appears to be for DVT prophylaxis at the nursing facility as well as aspirin for her heart condition.  The patient had reversal of her anticoagulation with Kcentra.  I had a conversation with the patient's mother today who gave additional history.  Apparently she had been diagnosed with a brain tumor and during the surgery the patient had severe bleeding and the procedure was aborted.  A recent CT scan of the brain shows encephalomalacia but no definitive tumor residual.  Patient is found to have a UTI, she is febrile and was started on empiric antibiotics with cultures pending.    Interval Problem Update  Patient seen and examined today.  Data, Medication data reviewed.  Case discussed with nursing as available.  Plan of Care reviewed with patient and notified of changes.  5/15, the patient is complaining of achiness, pain in her legs and arms, she is unwell, currently no further nosebleed noted.  T-max 100.3, heart rate in the  90s, respiration unlabored, 4 L nasal cannula oxygen, blood pressure initially down to 70/50, currently 104 with 60, given limited amount of fluid resuscitation secondary to the patient's poor ejection fraction.  Hemoglobin had been stable, the patient with significant hyponatremia, prerenal azotemia, total bilirubin of 4.6, albumin 2.1.  No iron deficiency identified, the patient severely coagulopathic initially elevated INR of 9.32, her TEG showed a platelet inhibition also, elevated procalcitonin,  Chest x-ray without cardiopulmonary disease, cardiomegaly is noted  5/16/2022:  Continue present medical management continue with ceftriaxone patient does have positive blood culture bottles gram-negative rods in addition to urinary culture is also positive for E. coli 10-50,000 colonies.  Repeat labs in the morning.  Patient with a MELD score of approximately 27.  Prognosis extremely guarded.  5/17: Patient seen and examined, afebrile, resting in bed, blood cultures 1/2 growing gram negative rods. Urine cultures growing ESBL. I have consulted infection disease  He prognosis is extremely guarded  Consulted palliative  5/18: No overnight events, afebrile, no nausea or vomiting. Now on meropenem for the ESBL in the urin and also in blood  ID following appreciate rec.  Due to her liver failure prognosis is extremely guarded    I have personally seen and examined the patient at bedside. I discussed the plan of care with patient, bedside RN, charge RN, , pharmacy and infectious disease.    Consultants/Specialty  critical care  Infection disease     Code Status  DNAR/DNI    Disposition  Patient is not medically cleared for discharge.   Anticipate discharge to to skilled nursing facility.  I have placed the appropriate orders for post-discharge needs.    Review of Systems  Review of Systems   Constitutional: Positive for fever and malaise/fatigue. Negative for chills.   HENT: Positive for nosebleeds.    Eyes:  Negative.    Respiratory: Negative.  Negative for cough.    Cardiovascular: Negative.  Negative for chest pain and palpitations.   Gastrointestinal: Negative.  Negative for heartburn, nausea and vomiting.   Genitourinary: Positive for urgency. Negative for dysuria and frequency.   Musculoskeletal: Positive for myalgias. Negative for back pain and neck pain.   Skin: Negative.  Negative for itching and rash.   Neurological: Positive for focal weakness and headaches. Negative for dizziness and weakness.   Endo/Heme/Allergies: Negative.  Negative for polydipsia. Does not bruise/bleed easily.   Psychiatric/Behavioral: Negative for depression. The patient is nervous/anxious.         Physical Exam  Temp:  [36.2 °C (97.2 °F)-37.2 °C (98.9 °F)] 36.2 °C (97.2 °F)  Pulse:  [71-86] 74  Resp:  [16-18] 16  BP: ()/(60-71) 95/62  SpO2:  [92 %-96 %] 96 %    Physical Exam  Vitals and nursing note reviewed.   Constitutional:       Appearance: She is well-developed and overweight. She is ill-appearing. She is not diaphoretic.      Interventions: Nasal cannula in place.   HENT:      Head: Normocephalic and atraumatic.      Comments: Ecchymosis, bruising     Nose: Nose normal.   Eyes:      Conjunctiva/sclera: Conjunctivae normal.      Pupils: Pupils are equal, round, and reactive to light.   Neck:      Thyroid: No thyromegaly.      Vascular: No JVD.   Cardiovascular:      Rate and Rhythm: Normal rate and regular rhythm.      Heart sounds: Normal heart sounds.     No friction rub. No gallop.   Pulmonary:      Effort: Pulmonary effort is normal.      Breath sounds: Normal breath sounds. No wheezing or rales.   Abdominal:      General: Bowel sounds are normal. There is no distension.      Palpations: Abdomen is soft. There is no mass.      Tenderness: There is no abdominal tenderness. There is no guarding or rebound.   Musculoskeletal:         General: Tenderness present. Normal range of motion.      Cervical back: Normal range of  motion and neck supple.   Lymphadenopathy:      Cervical: No cervical adenopathy.   Skin:     General: Skin is warm and dry.      Coloration: Skin is jaundiced.      Findings: Bruising, lesion and rash present.   Neurological:      Mental Status: She is oriented to person, place, and time. She is lethargic.      Cranial Nerves: No cranial nerve deficit.   Psychiatric:         Behavior: Behavior normal.         Fluids    Intake/Output Summary (Last 24 hours) at 5/18/2022 1040  Last data filed at 5/17/2022 2000  Gross per 24 hour   Intake 600 ml   Output 500 ml   Net 100 ml       Laboratory  Recent Labs     05/16/22 0216 05/18/22 0226   WBC 6.5 6.1   RBC 3.28* 3.12*   HEMOGLOBIN 11.4* 11.1*   HEMATOCRIT 33.5* 32.4*   .1* 103.8*   MCH 34.8* 35.6*   MCHC 34.0 34.3   RDW 63.7* 62.3*   PLATELETCT 85* 86*   MPV 10.7 10.6     Recent Labs     05/16/22 0216 05/18/22 0226   SODIUM 126* 125*   POTASSIUM 3.9 4.1   CHLORIDE 97 96   CO2 17* 22   GLUCOSE 116* 138*   BUN 30* 18   CREATININE 1.00 0.60   CALCIUM 7.5* 7.6*     Recent Labs     05/16/22 0216   INR 2.78*               Imaging  DX-CHEST-PORTABLE (1 VIEW)   Final Result         1.  No acute cardiopulmonary disease.   2.  Cardiomegaly   3.  Atherosclerosis           Assessment/Plan  * Epistaxis- (present on admission)  Assessment & Plan  Significant supratherapeutic INR appreciated on labs and we will hold Xarelto and administer Kcentra  Stopped for the time being  Recheck coags, hold antiplatelet and anticoagulation for the time being    Bacteremia  Assessment & Plan  Blood cultures 1/2 growing gram negative rods  Urine cultures growing ESBL  ID consulted appreciate rec.     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 coli 10-50,000 colonies in addition patient does have positive blood culture bottle for gram-negative rods.  Continue ceftriaxone 1 g continue fluid resuscitation as needed be mindful of fluid resuscitation the patient has decreased albumin likely will end up start third spacing.  Patient continues to have elevated INR no overt signs of bleeding.  Patient with MELD score of 27.    Supratherapeutic INR- (present on admission)  Assessment & Plan  Greater than 9 with active bleeding we will administer PCC at this time    Anemia- (present on admission)  Assessment & Plan  Transfuse hemoglobin less than 7        Advance care planning- (present on admission)  Assessment & Plan    CODE STATUS obtained at bedside and patient wishes for DO NOT RESUSCITATE DO NOT INTUBATE CODE STATUS with nurse present/intensivist present and myself and she is alert and oriented x4.    Prolonged Q-T interval on ECG- (present on admission)  Assessment & Plan  Avoid QTC prolonging agents  Ativan for nausea vomiting    Thrombocytopenia (HCC)- (present on admission)  Assessment & Plan  Platelet admission secondary to aspirin use  Current bleeding has stopped, monitor  Consider platelet transfusion if rebleeding      Severe protein-calorie malnutrition (HCC)- (present on admission)  Assessment & Plan  Dietary consult    Dilated cardiomyopathy (HCC)- (present on admission)  Assessment & Plan  Likely from longstanding methamphetamine abuse/alcoholism leading to her dilated cardiomyopathy.  Last ejection fraction 15% given 2 L in ED  Resume medication regimen once the patient tolerates    Macrocytosis- (present on admission)  Assessment & Plan  Secondary to longstanding  alcoholism      Methamphetamine abuse (HCC)- (present on admission)  Assessment & Plan  Urine drug test ordered and pending  Dilated cardiomyopathy appreciated in history and last echocardiogram ejection fraction 15%    Liver cirrhosis (HCC)- (present on admission)  Assessment & Plan  With elevated bilirubin, longstanding alcohol history       Medically complex high risk patient    VTE prophylaxis: pharmacologic prophylaxis contraindicated due to Bleeding    I have performed a physical exam and reviewed and updated ROS and Plan today (5/18/2022). In review of yesterday's note (5/17/2022), there are no changes except as documented above.

## 2022-05-19 LAB
ANION GAP SERPL CALC-SCNC: 6 MMOL/L (ref 7–16)
BUN SERPL-MCNC: 15 MG/DL (ref 8–22)
CALCIUM SERPL-MCNC: 7.4 MG/DL (ref 8.5–10.5)
CHLORIDE SERPL-SCNC: 99 MMOL/L (ref 96–112)
CO2 SERPL-SCNC: 22 MMOL/L (ref 20–33)
CREAT SERPL-MCNC: 0.61 MG/DL (ref 0.5–1.4)
ERYTHROCYTE [DISTWIDTH] IN BLOOD BY AUTOMATED COUNT: 60.5 FL (ref 35.9–50)
GFR SERPLBLD CREATININE-BSD FMLA CKD-EPI: 104 ML/MIN/1.73 M 2
GLUCOSE SERPL-MCNC: 148 MG/DL (ref 65–99)
HCT VFR BLD AUTO: 28.7 % (ref 37–47)
HGB BLD-MCNC: 9.8 G/DL (ref 12–16)
MCH RBC QN AUTO: 34.6 PG (ref 27–33)
MCHC RBC AUTO-ENTMCNC: 34.1 G/DL (ref 33.6–35)
MCV RBC AUTO: 101.4 FL (ref 81.4–97.8)
PLATELET # BLD AUTO: 91 K/UL (ref 164–446)
PMV BLD AUTO: 11.7 FL (ref 9–12.9)
POTASSIUM SERPL-SCNC: 4.5 MMOL/L (ref 3.6–5.5)
RBC # BLD AUTO: 2.83 M/UL (ref 4.2–5.4)
SODIUM SERPL-SCNC: 127 MMOL/L (ref 135–145)
WBC # BLD AUTO: 4.1 K/UL (ref 4.8–10.8)

## 2022-05-19 PROCEDURE — 90837 PSYTX W PT 60 MINUTES: CPT | Performed by: SOCIAL WORKER

## 2022-05-19 PROCEDURE — 80048 BASIC METABOLIC PNL TOTAL CA: CPT

## 2022-05-19 PROCEDURE — 770020 HCHG ROOM/CARE - TELE (206)

## 2022-05-19 PROCEDURE — 700102 HCHG RX REV CODE 250 W/ 637 OVERRIDE(OP): Performed by: STUDENT IN AN ORGANIZED HEALTH CARE EDUCATION/TRAINING PROGRAM

## 2022-05-19 PROCEDURE — A9270 NON-COVERED ITEM OR SERVICE: HCPCS | Performed by: STUDENT IN AN ORGANIZED HEALTH CARE EDUCATION/TRAINING PROGRAM

## 2022-05-19 PROCEDURE — 700105 HCHG RX REV CODE 258: Performed by: INTERNAL MEDICINE

## 2022-05-19 PROCEDURE — 99232 SBSQ HOSP IP/OBS MODERATE 35: CPT | Performed by: INTERNAL MEDICINE

## 2022-05-19 PROCEDURE — 700111 HCHG RX REV CODE 636 W/ 250 OVERRIDE (IP): Performed by: INTERNAL MEDICINE

## 2022-05-19 PROCEDURE — 36415 COLL VENOUS BLD VENIPUNCTURE: CPT

## 2022-05-19 PROCEDURE — 85027 COMPLETE CBC AUTOMATED: CPT

## 2022-05-19 PROCEDURE — 99233 SBSQ HOSP IP/OBS HIGH 50: CPT | Performed by: INTERNAL MEDICINE

## 2022-05-19 PROCEDURE — 99497 ADVNCD CARE PLAN 30 MIN: CPT | Performed by: NURSE PRACTITIONER

## 2022-05-19 RX ADMIN — BUDESONIDE AND FORMOTEROL FUMARATE DIHYDRATE 2 PUFF: 80; 4.5 AEROSOL RESPIRATORY (INHALATION) at 18:07

## 2022-05-19 RX ADMIN — SENNOSIDES AND DOCUSATE SODIUM 2 TABLET: 50; 8.6 TABLET ORAL at 17:50

## 2022-05-19 RX ADMIN — Medication 5 MG: at 20:12

## 2022-05-19 RX ADMIN — SENNOSIDES AND DOCUSATE SODIUM 2 TABLET: 50; 8.6 TABLET ORAL at 06:35

## 2022-05-19 RX ADMIN — MEROPENEM 500 MG: 500 INJECTION, POWDER, FOR SOLUTION INTRAVENOUS at 17:50

## 2022-05-19 RX ADMIN — BUDESONIDE AND FORMOTEROL FUMARATE DIHYDRATE 2 PUFF: 80; 4.5 AEROSOL RESPIRATORY (INHALATION) at 06:34

## 2022-05-19 RX ADMIN — MEROPENEM 500 MG: 500 INJECTION, POWDER, FOR SOLUTION INTRAVENOUS at 13:26

## 2022-05-19 RX ADMIN — MEROPENEM 500 MG: 500 INJECTION, POWDER, FOR SOLUTION INTRAVENOUS at 06:35

## 2022-05-19 RX ADMIN — OXYCODONE 5 MG: 5 TABLET ORAL at 20:15

## 2022-05-19 RX ADMIN — TIOTROPIUM BROMIDE INHALATION SPRAY 5 MCG: 3.12 SPRAY, METERED RESPIRATORY (INHALATION) at 06:34

## 2022-05-19 RX ADMIN — ATORVASTATIN CALCIUM 20 MG: 20 TABLET, FILM COATED ORAL at 17:50

## 2022-05-19 RX ADMIN — FUROSEMIDE 20 MG: 20 TABLET ORAL at 06:34

## 2022-05-19 RX ADMIN — METOPROLOL SUCCINATE 25 MG: 25 TABLET, EXTENDED RELEASE ORAL at 06:34

## 2022-05-19 ASSESSMENT — ENCOUNTER SYMPTOMS
DIARRHEA: 0
CHILLS: 0
ABDOMINAL PAIN: 0
CARDIOVASCULAR NEGATIVE: 1
DEPRESSION: 0
HEARTBURN: 0
BRUISES/BLEEDS EASILY: 0
MYALGIAS: 1
FOCAL WEAKNESS: 1
WEAKNESS: 0
BACK PAIN: 0
EYES NEGATIVE: 1
PALPITATIONS: 0
NECK PAIN: 0
VOMITING: 0
GASTROINTESTINAL NEGATIVE: 1
MYALGIAS: 0
NERVOUS/ANXIOUS: 1
NAUSEA: 0
SORE THROAT: 1
COUGH: 0
POLYDIPSIA: 0
RESPIRATORY NEGATIVE: 1
DIZZINESS: 0
FEVER: 1
HEADACHES: 1
NERVOUS/ANXIOUS: 0

## 2022-05-19 ASSESSMENT — PAIN DESCRIPTION - PAIN TYPE
TYPE: ACUTE PAIN
TYPE: ACUTE PAIN

## 2022-05-19 ASSESSMENT — FIBROSIS 4 INDEX
FIB4 SCORE: 9.13
FIB4 SCORE: 9.13

## 2022-05-19 NOTE — DOCUMENTATION QUERY
[facility]                                                                       Query Response Note      PATIENT:               VALENTÍN MILAN  ACCT #:                  [id number]  MRN:                     [id number]  :                      1965  ADMIT DATE:       2022 5:21 PM  DISCH DATE:          RESPONDING  PROVIDER #:        [id number]           QUERY TEXT:    Severe protein calorie malnutrition is documented in the medical record, however, the Dietary Evaluation on 22 describes the patient as overweight and not meeting criteria for Severe protein calorie malnutrition. After study, please confirm/clarify the appropriate diagnosis for this admission.    NOTE: If an appropriate response is not listed below, please respond with a new note.            The patient's Clinical Indicators include:  NOTED  - Dietary Evaluation:  BMI: 26.08 ; Classification- Overweight  Pt reports weight loss but contributes weight loss to previous edema.  +3 pitting edema previous hospitalization...placed on Lasix and shed fluid.  Weigh loss most likely related to fluid accumulation.  Pt looked adequately nourished for age.   Malnutrition Risk: Does not meet criteria   Magic cups BID   Cardiomyopathy    Thank you,  Tere Sauceda RN, CCS  Clinical Documentation Integrity  Connect via [employer] Messenger  Options provided:   -- Severe Protein calorie malnutrition is valid   -- Mild malnutrition   -- Moderate malnutrition   -- Patient does not have malnutrition; ruled out   -- Unable to determine      Query created by: Tere Sauceda on 2022 12:40 PM    RESPONSE TEXT:    Severe Protein calorie malnutrition is valid       QUERY TEXT:    After study, please clarify the relationship, if any, between epistaxis and Xarelto and/or ASA.    NOTE:  If an appropriate response is not listed below, please respond with a new note.        The patient's Clinical  Indicators include:  Epistaxis/Hypotension  Supratherapeutic INR (> 9) - 9.32 2.78   Platelets - (<100) 93-86   TEG  Kcentra reversal  Holding antiplatelet and anticoagulation  Thrombocytopenia  Cirrhosis     Thank you,  Tere Sauceda RN, CCS  Clinical Documentation Integrity  Connect via [employer] Messenger  Options provided:   -- Epistaxis is due to/associated with Xarelto and/or ASA   -- Unrelated   -- Unable to determine      Query created by: Tere Sauceda on 5/18/2022 1:11 PM    RESPONSE TEXT:    Unable to determine          Electronically signed by:  GINNY SEGOVIA MD 5/19/2022 9:05 AM

## 2022-05-19 NOTE — CARE PLAN
The patient is Watcher - Medium risk of patient condition declining or worsening    Shift Goals  Clinical Goals: Safety, promote rest  Patient Goals: Comfort  Family Goals: MORALES. No family present.    Progress made toward(s) clinical / shift goals:    Problem: Knowledge Deficit - Standard  Goal: Patient and family/care givers will demonstrate understanding of plan of care, disease process/condition, diagnostic tests and medications  Outcome: Progressing     Problem: Pain - Standard  Goal: Alleviation of pain or a reduction in pain to the patient’s comfort goal  Outcome: Progressing     Problem: Skin Integrity  Goal: Skin integrity is maintained or improved  Outcome: Progressing     Problem: Fall Risk  Goal: Patient will remain free from falls  Outcome: Progressing       Patient is not progressing towards the following goals: N/A

## 2022-05-19 NOTE — PROGRESS NOTES
[facility] Medicine Daily Progress Note    Date of Service  5/19/2022    Chief Complaint  Maral Herrera is a 56 y.o. female admitted 5/14/2022 with severe epistaxis for 3 days    Hospital Course  This is a 56-year-old female with a history of a brain tumor, alcohol use, methamphetamine use, severe dilated cardiomyopathy presumed from methamphetamine use, residing at [facility], had a reported ground-level fall approximately a month ago with periorbital swelling and maxillary and sphenoid ecchymosis, the patient is on aspirin as well as Xarelto at the nursing facility and develops a significant episode of epistaxis.  The patient has a cardiomyopathy with ejection fraction of 15%.  The patient was seen in this facility at the beginning of April, cardiology was consulted, the patient at the time positive for methamphetamine.  The patient has a history of longstanding alcohol abuse.  She does have cirrhosis and reportedly has a TIPS in place.  The patient was on Xarelto what appears to be for DVT prophylaxis at the nursing facility as well as aspirin for her heart condition.  The patient had reversal of her anticoagulation with Kcentra.  I had a conversation with the patient's mother today who gave additional history.  Apparently she had been diagnosed with a brain tumor and during the surgery the patient had severe bleeding and the procedure was aborted.  A recent CT scan of the brain shows encephalomalacia but no definitive tumor residual.  Patient is found to have a UTI, she is febrile and was started on empiric antibiotics with cultures pending.    Interval Problem Update  Patient seen and examined today.  Data, Medication data reviewed.  Case discussed with nursing as available.  Plan of Care reviewed with patient and notified of changes.  5/15, the patient is complaining of achiness, pain in her legs and arms, she is unwell, currently no further nosebleed noted.  T-max 100.3, heart rate in the  90s, respiration unlabored, 4 L nasal cannula oxygen, blood pressure initially down to 70/50, currently 104 with 60, given limited amount of fluid resuscitation secondary to the patient's poor ejection fraction.  Hemoglobin had been stable, the patient with significant hyponatremia, prerenal azotemia, total bilirubin of 4.6, albumin 2.1.  No iron deficiency identified, the patient severely coagulopathic initially elevated INR of 9.32, her TEG showed a platelet inhibition also, elevated procalcitonin,  Chest x-ray without cardiopulmonary disease, cardiomegaly is noted  5/16/2022:  Continue present medical management continue with ceftriaxone patient does have positive blood culture bottles gram-negative rods in addition to urinary culture is also positive for E. coli 10-50,000 colonies.  Repeat labs in the morning.  Patient with a MELD score of approximately 27.  Prognosis extremely guarded.  5/17: Patient seen and examined, afebrile, resting in bed, blood cultures 1/2 growing gram negative rods. Urine cultures growing ESBL. I have consulted infection disease  He prognosis is extremely guarded  Consulted palliative  5/18: No overnight events, afebrile, no nausea or vomiting. Now on meropenem for the ESBL in the urin and also in blood  ID following appreciate rec.  Due to her liver failure prognosis is extremely guarded  5/19: Patient resting in bed, afebrile, no overnight events, cont on meropenem as per ID rec.   Prognosis is poor palliative team following   I have personally seen and examined the patient at bedside. I discussed the plan of care with patient, bedside RN, charge RN, , pharmacy and infectious disease.    Consultants/Specialty  critical care  Infection disease     Code Status  DNAR/DNI    Disposition  Patient is not medically cleared for discharge.   Anticipate discharge to to skilled nursing facility.  I have placed the appropriate orders for post-discharge needs.    Review of  Systems  Review of Systems   Constitutional: Positive for fever and malaise/fatigue. Negative for chills.   HENT: Positive for nosebleeds.    Eyes: Negative.    Respiratory: Negative.  Negative for cough.    Cardiovascular: Negative.  Negative for chest pain and palpitations.   Gastrointestinal: Negative.  Negative for heartburn, nausea and vomiting.   Genitourinary: Positive for urgency. Negative for dysuria and frequency.   Musculoskeletal: Positive for myalgias. Negative for back pain and neck pain.   Skin: Negative.  Negative for itching and rash.   Neurological: Positive for focal weakness and headaches. Negative for dizziness and weakness.   Endo/Heme/Allergies: Negative.  Negative for polydipsia. Does not bruise/bleed easily.   Psychiatric/Behavioral: Negative for depression. The patient is nervous/anxious.         Physical Exam  Temp:  [36.6 °C (97.8 °F)-37.4 °C (99.3 °F)] 36.8 °C (98.2 °F)  Pulse:  [73-93] 73  Resp:  [15-18] 18  BP: ()/(55-66) 100/65  SpO2:  [92 %-96 %] 96 %    Physical Exam  Vitals and nursing note reviewed.   Constitutional:       Appearance: She is well-developed and overweight. She is ill-appearing. She is not diaphoretic.      Interventions: Nasal cannula in place.   HENT:      Head: Normocephalic and atraumatic.      Comments: Ecchymosis, bruising     Nose: Nose normal.   Eyes:      Conjunctiva/sclera: Conjunctivae normal.      Pupils: Pupils are equal, round, and reactive to light.   Neck:      Thyroid: No thyromegaly.      Vascular: No JVD.   Cardiovascular:      Rate and Rhythm: Normal rate and regular rhythm.      Heart sounds: Normal heart sounds.     No friction rub. No gallop.   Pulmonary:      Effort: Pulmonary effort is normal.      Breath sounds: Normal breath sounds. No wheezing or rales.   Abdominal:      General: Bowel sounds are normal. There is no distension.      Palpations: Abdomen is soft. There is no mass.      Tenderness: There is no abdominal tenderness.  There is no guarding or rebound.   Musculoskeletal:         General: Tenderness present. Normal range of motion.      Cervical back: Normal range of motion and neck supple.   Lymphadenopathy:      Cervical: No cervical adenopathy.   Skin:     General: Skin is warm and dry.      Coloration: Skin is jaundiced.      Findings: Bruising, lesion and rash present.   Neurological:      Mental Status: She is oriented to person, place, and time. She is lethargic.      Cranial Nerves: No cranial nerve deficit.   Psychiatric:         Behavior: Behavior normal.         Fluids    Intake/Output Summary (Last 24 hours) at 5/19/2022 1142  Last data filed at 5/19/2022 0637  Gross per 24 hour   Intake 360 ml   Output 900 ml   Net -540 ml       Laboratory  Recent Labs     05/18/22 0226 05/19/22  0248   WBC 6.1 4.1*   RBC 3.12* 2.83*   HEMOGLOBIN 11.1* 9.8*   HEMATOCRIT 32.4* 28.7*   .8* 101.4*   MCH 35.6* 34.6*   MCHC 34.3 34.1   RDW 62.3* 60.5*   PLATELETCT 86* 91*   MPV 10.6 11.7     Recent Labs     05/18/22 0226 05/19/22  0248   SODIUM 125* 127*   POTASSIUM 4.1 4.5   CHLORIDE 96 99   CO2 22 22   GLUCOSE 138* 148*   BUN 18 15   CREATININE 0.60 0.61   CALCIUM 7.6* 7.4*                   Imaging  DX-CHEST-PORTABLE (1 VIEW)   Final Result         1.  No acute cardiopulmonary disease.   2.  Cardiomegaly   3.  Atherosclerosis      MR-THORACIC SPINE-WITH    (Results Pending)        Assessment/Plan  * Epistaxis- (present on admission)  Assessment & Plan  Significant supratherapeutic INR appreciated on labs and we will hold Xarelto and administer Kcentra  Stopped for the time being  Recheck coags, hold antiplatelet and anticoagulation for the time being    Bacteremia  Assessment & Plan  Blood cultures 1/2 growing gram negative rods  Urine cultures growing ESBL  ID consulted appreciate rec.     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 coli 10-50,000 colonies in addition patient does have positive blood culture bottle for gram-negative rods.  Continue ceftriaxone 1 g continue fluid resuscitation as needed be mindful of fluid resuscitation the patient has decreased albumin likely will end up start third spacing.  Patient continues to have elevated INR no overt signs of bleeding.  Patient with MELD score of 27.    Supratherapeutic INR- (present on admission)  Assessment & Plan  Greater than 9 with active bleeding we will administer PCC at this time    Anemia- (present on admission)  Assessment & Plan  Transfuse hemoglobin less than 7        Advance care planning- (present on admission)  Assessment & Plan    CODE STATUS obtained at bedside and patient wishes for DO NOT RESUSCITATE DO NOT INTUBATE CODE STATUS with nurse present/intensivist present and myself and she is alert and oriented x4.    Prolonged Q-T interval on ECG- (present on admission)  Assessment & Plan  Avoid QTC prolonging agents  Ativan for nausea vomiting    Thrombocytopenia (HCC)- (present on admission)  Assessment & Plan  Platelet admission secondary to aspirin use  Current bleeding has stopped, monitor  Consider platelet transfusion if rebleeding      Severe protein-calorie malnutrition (HCC)- (present on admission)  Assessment & Plan  Dietary consult    Dilated cardiomyopathy (HCC)- (present on admission)  Assessment & Plan  Likely from longstanding methamphetamine abuse/alcoholism leading to her dilated cardiomyopathy.  Last ejection fraction 15% given 2 L in ED  Resume  medication regimen once the patient tolerates    Macrocytosis- (present on admission)  Assessment & Plan  Secondary to longstanding alcoholism      Methamphetamine abuse (HCC)- (present on admission)  Assessment & Plan  Urine drug test ordered and pending  Dilated cardiomyopathy appreciated in history and last echocardiogram ejection fraction 15%    Liver cirrhosis (HCC)- (present on admission)  Assessment & Plan  With elevated bilirubin, longstanding alcohol history       Medically complex high risk patient    VTE prophylaxis: pharmacologic prophylaxis contraindicated due to Bleeding    I have performed a physical exam and reviewed and updated ROS and Plan today (5/19/2022). In review of yesterday's note (5/18/2022), there are no changes except as documented above.

## 2022-05-19 NOTE — PROGRESS NOTES
"Palliative Care Advance Care Planning Progress Note    General: Maral Herrera is a 56-year-old female with history of severe dilated cardiomyopathy with EF of 15%, VILMA, cirrhosis status post TIPS, benign brain tumor previously treated in [place], admitted 5/14/2022 for epistaxis.  Anticoagulants were reversed.  ID following for positive urine and blood cultures for E. coli ESBL.  Patient was originally seen by palliative care 5/18/2022 for advance care planning.    Discussion: Patient more awaken and able to hold focus today.  She reports she continues to feel fatigued and had a hard time resting overnight.  She is getting sleep \"on and off.\"  Patient confirms she met with therapist Izabella Bermeo as well as [facility] liaison this morning. She reports both were helpful and she is in better spirits today.  She expressed she is still undecided on which route to go but has appreciated all the information.  She expressed she is frustrated with her diet due to low-salt because the food is unappealing.  We discussed for her disease management of her heart failure she is on a low-sodium diet which is somewhat restrictive as it will help with the peripheral edema she experiences.  Examined legs and currently there is no significant peripheral edema in her ankles.  We discussed that if she chooses the comfort/hospice focused route there would be no restrictions on what she can eat as the focus is on quality of life not extension of life or disease management but rather symptom management.  She expressed she understood.  She denied having questions or needs at this time.    Provided therapeutic communication including open-ended questions, therapeutic presence/silence, reflective listening, and validation of thoughts/feelings throughout encounter. Provided palliative care business card on bedside table. Encouraged patient to call with any questions or needs.     Outcome: Supportive visit. Patient remains undecided about " hospice at this time. Spoke with [facility] liaison this morning per patient report.     Plan: Continue to follow clinical picture and provide supportive visits/discuss goals of care.     Please call our team with questions and/or additional needs.    Total visit time was 20 minutes discussing advance care planning.    SPRING Vu.  Palliative Care Nurse Practitioner  [phone]

## 2022-05-19 NOTE — PROGRESS NOTES
Infectious Disease Progress Note    Author: Gaviota Champion M.D. Date & Time of service: 2022  8:51 AM    Chief Complaint:  Bacteremia    Interval History:    Review of Systems:  Review of Systems   HENT: Positive for sore throat.    Respiratory: Negative for cough.    Gastrointestinal: Negative for abdominal pain, diarrhea, nausea and vomiting.   Musculoskeletal: Negative for myalgias.   Psychiatric/Behavioral: The patient is not nervous/anxious.        Hemodynamics:  Temp (24hrs), Av.9 °C (98.4 °F), Min:36.2 °C (97.2 °F), Max:37.4 °C (99.3 °F)  Temperature: 36.6 °C (97.8 °F)  Pulse  Av.1  Min: 71  Max: 120   Blood Pressure: 100/66       Physical Exam:  Physical Exam  HENT:      Mouth/Throat:      Mouth: Mucous membranes are moist.      Pharynx: Oropharynx is clear.      Comments: No obvious lesions on tongue  Cardiovascular:      Rate and Rhythm: Normal rate and regular rhythm.   Pulmonary:      Effort: Pulmonary effort is normal.      Breath sounds: Normal breath sounds.   Abdominal:      General: Abdomen is flat. Bowel sounds are normal.      Palpations: Abdomen is soft.   Musculoskeletal:         General: Normal range of motion.   Skin:     General: Skin is warm.   Neurological:      General: No focal deficit present.      Mental Status: She is oriented to person, place, and time.   Psychiatric:         Mood and Affect: Mood normal.         Meds:    Current Facility-Administered Medications:   •  meropenem  •  melatonin  •  sodium chloride  •  atorvastatin  •  budesonide-formoterol  •  [Held by provider] lisinopril  •  furosemide  •  metoprolol SR  •  [Held by provider] spironolactone  •  tiotropium  •  senna-docusate **AND** polyethylene glycol/lytes **AND** magnesium hydroxide **AND** bisacodyl  •  acetaminophen  •  Notify provider if pain remains uncontrolled **AND** Use the Numeric Rating Scale (NRS), Sanchez-Baker Faces (WBF), or FLACC on regular floors and Critical-Care Pain Observation  Tool (CPOT) on ICUs/Trauma to assess pain **AND** Pulse Ox **AND** Pharmacy Consult Request **AND** If patient difficult to arouse and/or has respiratory depression (respiratory rate of 10 or less), stop any opiates that are currently infusing and call a Rapid Response.  •  oxyCODONE immediate-release **OR** oxyCODONE immediate-release **OR** HYDROmorphone  •  hydrALAZINE  •  LORazepam    Labs:  Recent Labs     05/18/22 0226 05/19/22  0248   WBC 6.1 4.1*   RBC 3.12* 2.83*   HEMOGLOBIN 11.1* 9.8*   HEMATOCRIT 32.4* 28.7*   .8* 101.4*   MCH 35.6* 34.6*   RDW 62.3* 60.5*   PLATELETCT 86* 91*   MPV 10.6 11.7     Recent Labs     05/18/22 0226 05/19/22  0248   SODIUM 125* 127*   POTASSIUM 4.1 4.5   CHLORIDE 96 99   CO2 22 22   GLUCOSE 138* 148*   BUN 18 15     Recent Labs     05/18/22 0226 05/19/22  0248   ALBUMIN 2.0*  --    TBILIRUBIN 3.3*  --    ALKPHOSPHAT 130*  --    TOTPROTEIN 5.7*  --    ALTSGPT 34  --    ASTSGOT 85*  --    CREATININE 0.60 0.61       Imaging:  DX-CHEST-PORTABLE (1 VIEW)    Result Date: 5/15/2022    5/15/2022 1:32 AM HISTORY/REASON FOR EXAM: Hx of cardiomyopathy EF 15% given 2 L bolus in ED TECHNIQUE/EXAM DESCRIPTION:  Single AP view of the chest. COMPARISON: April 12, 2022 FINDINGS: Overlying cardiac leads are present. Cardiomegaly is observed. Atherosclerotic calcification of the aorta is noted.  The central pulmonary vasculature appears normal. The lungs appear well expanded bilaterally.  Bilateral lungs are clear. No significant pleural effusions are identified. The bony structures appear age-appropriate.     1.  No acute cardiopulmonary disease. 2.  Cardiomegaly 3.  Atherosclerosis      Micro:  Results     Procedure Component Value Units Date/Time    BLOOD CULTURE [[id number]]  (Abnormal)  (Susceptibility) Collected: 05/15/22 0640    Order Status: Completed Specimen: Blood from Peripheral Updated: 05/18/22 0846     Significant Indicator POS     Source BLD     Site PERIPHERAL      "Culture Result Growth detected by Bactec instrument. 05/16/2022  03:47      Escherichia coli ESBL    Narrative:      CALL  [place]  171 tel. [phone],  CALLED  171 tel. [phone] 05/16/2022, 03:53, RB PERF. RESULTS CALLED TO: RN  [phone]  Per Hospital Policy: Only change Specimen Src: to \"Line\" if  specified by physician order.  No site indicated    Susceptibility     Escherichia coli esbl (1)     Antibiotic Interpretation Microscan   Method Status    Ampicillin Resistant >16 mcg/mL RYAN Final    Ceftriaxone Resistant >32 mcg/mL RYAN Final    Cefazolin Resistant >16 mcg/mL RYAN Final    Ciprofloxacin Resistant >2 mcg/mL RYAN Final    Cefepime Resistant >16 mcg/mL RYAN Final    Cefuroxime Resistant >16 mcg/mL RYAN Final    Ampicillin/sulbactam Resistant >16/8 mcg/mL RYAN Final    Ertapenem Sensitive <=0.5 mcg/mL RYAN Final    Tobramycin Resistant >8 mcg/mL RYAN Final    Gentamicin Resistant >8 mcg/mL RYAN Final    Minocycline Sensitive <=4 mcg/mL RYAN Final    Moxifloxacin Resistant >4 mcg/mL RYAN Final    Pip/Tazobactam Sensitive <=8 mcg/mL RYAN Final    Trimeth/Sulfa Sensitive <=0.5/9.5 mcg/mL RYAN Final    Tigecycline Sensitive <=2 mcg/mL RYAN Final                   BLOOD CULTURE [[id number]] Collected: 05/17/22 1424    Order Status: Completed Specimen: Blood from Peripheral Updated: 05/18/22 0835     Significant Indicator NEG     Source BLD     Site PERIPHERAL     Culture Result No Growth  Note: Blood cultures are incubated for 5 days and  are monitored continuously.Positive blood cultures  are called to the RN and reported as soon as  they are identified.      Narrative:      Contact  Per Hospital Policy: Only change Specimen Src: to \"Line\" if  specified by physician order.  No site indicated    BLOOD CULTURE [[id number]] Collected: 05/17/22 1417    Order Status: Completed Specimen: Blood from Peripheral Updated: 05/18/22 0835     Significant Indicator NEG     Source BLD     Site PERIPHERAL     Culture Result No Growth  Note: " "Blood cultures are incubated for 5 days and  are monitored continuously.Positive blood cultures  are called to the RN and reported as soon as  they are identified.      Narrative:      Contact  Per Hospital Policy: Only change Specimen Src: to \"Line\" if  specified by physician order.  Left Hand    URINE CULTURE(NEW) [[id number]]  (Abnormal)  (Susceptibility) Collected: 05/15/22 0859    Order Status: Completed Specimen: Urine, Clean Catch Updated: 05/17/22 0902     Significant Indicator POS     Source UR     Site URINE, CLEAN CATCH     Culture Result -      Escherichia coli ESBL  ,000 cfu/mL  Extended Spectrum Beta-lactamase (ESBL) isolated.  ESBL's may be clinically resistant to therapy with  Penicillins,Cephalosporins or Aztreonam despite  apparent in vitro susceptibility to some of these agents.  The patient requires contact isolation.  Please contact pharmacy or an Infectious Disease Specialist  if you have any questions about appropriate therapy.      Narrative:      Collected By: [id number] MILDRED NUÑEZ  Indication for culture:->Evaluation for sepsis without a  clear source of infection  Collected By: [id number] MILDRED NUÑEZ    Susceptibility     Escherichia coli esbl (1)     Antibiotic Interpretation Microscan   Method Status    Ampicillin Resistant >16 mcg/mL RYAN Final    Ceftriaxone Resistant >32 mcg/mL RYAN Final    Cefazolin Resistant >16 mcg/mL RYAN Final     Breakpoints when Cefazolin is used for therapy of infections  other than uncomplicated UTIs due to Enterobacterales are as  follows:  RYAN and Interpretation:  <=2 S  4 I  >=8 R         Ciprofloxacin Resistant >2 mcg/mL RYAN Final    Cefepime Resistant >16 mcg/mL RYAN Final    Cefuroxime Resistant >16 mcg/mL RYAN Final    Ampicillin/sulbactam Resistant >16/8 mcg/mL RYAN Final    Tobramycin Resistant >8 mcg/mL RYAN Final    Nitrofurantoin Sensitive <=32 mcg/mL RYAN Final    Gentamicin Resistant >8 mcg/mL RYAN Final    Levofloxacin Resistant >4 mcg/mL " "RYAN Final    Minocycline Sensitive <=4 mcg/mL RYAN Final    Pip/Tazobactam Sensitive <=8 mcg/mL RYAN Final    Trimeth/Sulfa Sensitive <=0.5/9.5 mcg/mL RYAN Final    Tigecycline Sensitive <=2 mcg/mL RYAN Final                   BLOOD CULTURE [[id number]] Collected: 05/14/22 1752    Order Status: Completed Specimen: Blood from Peripheral Updated: 05/16/22 0842     Significant Indicator NEG     Source BLD     Site PERIPHERAL     Culture Result No Growth  Note: Blood cultures are incubated for 5 days and  are monitored continuously.Positive blood cultures  are called to the RN and reported as soon as  they are identified.      Narrative:      Per Hospital Policy: Only change Specimen Src: to \"Line\" if  specified by physician order.  Right AC    URINALYSIS [[id number]]  (Abnormal) Collected: 05/15/22 0859    Order Status: Completed Specimen: Urine, Clean Catch Updated: 05/15/22 0948     Color DK Yellow     Character Cloudy     Specific Gravity 1.016     Ph 5.5     Glucose Negative mg/dL      Ketones Negative mg/dL      Protein 30 mg/dL      Bilirubin Negative     Urobilinogen, Urine 1.0     Nitrite Positive     Leukocyte Esterase Large     Occult Blood Large     Micro Urine Req Microscopic    Narrative:      Collected By: [id number] MILDRED NUÑEZ  Indication for culture:->Evaluation for sepsis without a  clear source of infection    CULTURE RESPIRATORY W/ GRM STN [[id number]]     Order Status: Completed Specimen: Respirate from Sputum     Blood Culture,Hold [[id number]] Collected: 05/14/22 1752    Order Status: Completed Updated: 05/14/22 1827     Blood Culture Hold Collected    URINALYSIS [[id number]] Collected: 05/14/22 0000    Order Status: Canceled Specimen: Urine, Clean Catch           Assessment:  Active Hospital Problems    Diagnosis    • *Epistaxis [R04.0]    • Bacteremia [R78.81]    • Advance care planning [Z71.89]    • Anemia [D64.9]    • Supratherapeutic INR [R79.1]    • Septic shock (HCC) [A41.9, R65.21]    • " Hyponatremia [E87.1]    • Acute cystitis without hematuria [N30.00]    • 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   Micro reviewed    Patient complaining of some tongue pain but no obvious lesions.  Awaiting MRI thoracic spine.  Continued on antibiotics as below.     Hospital Course/Assessment:   Maral Herrera is a 56 y.o. female with reported history of a brain tumor, methamphetamine abuse, alcohol abuse, severe dilated cardiomyopathy, residing at [facility], ground-level fall about a month prior with periorbital swelling and maxillary and sphenoid ecchymosis, cirrhosis status post TIPS, on chronic anticoagulation with Xarelto and aspirin, presented to the hospital on 5/14 with severe epistaxis for 3 days.  Reversal of anticoagulation was done with Kcentra.  Patient was also noted to be febrile and was found to have both blood and urine cultures positive for ESBL E. coli     Pertinent Diagnoses:  Leukopenia, new  Thrombocytopenia, persistent likely secondary to liver disease  ESBL E. coli bacteremia  Methamphetamine abuse  Severe nonischemic dilated cardiomyopathy  Epistaxis, resolved  Reported history of brain tumor  Alcohol abuse  Acute mid back pain     Plan:   -Continue IV meropenem 500 mg every 6 hours while inpatient.    Final antibiotic selection and duration be determined based on imaging and clinical improvement.  Uncertain source given absence of UTI symptoms.  Positive urine culture could be spillage from systemic infection  -Given methamphetamine abuse and acute mid back pain, recommend MRI of the T-spine to  look for epidural abscess -ordered and pending  -Patient noting soreness of bilateral inner thighs, no concerns on exam findings but will need to be monitored and if any worsening may need imaging to look for underlying abscesses     Plan was discussed with the primary team, Dr. Alicea.      Please feel free to call with questions.

## 2022-05-19 NOTE — PROGRESS NOTES
Monitor Summary    Rhythm: SR w/ BBB  Rate: 80-90  Ectopy: (R) PVCs  Measurement: .19/.15/.41
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
## 2022-05-20 ENCOUNTER — HOSPICE ADMISSION (OUTPATIENT)
Dept: HOSPICE | Facility: HOSPICE | Age: 57
End: 2022-05-20
Payer: MEDICARE

## 2022-05-20 ENCOUNTER — HOME CARE VISIT (OUTPATIENT)
Dept: HOSPICE | Facility: HOSPICE | Age: 57
End: 2022-05-20
Payer: MEDICARE

## 2022-05-20 LAB
BACTERIA BLD CULT: NORMAL
SIGNIFICANT IND 70042: NORMAL
SITE SITE: NORMAL
SOURCE SOURCE: NORMAL

## 2022-05-20 PROCEDURE — 770001 HCHG ROOM/CARE - MED/SURG/GYN PRIV*

## 2022-05-20 PROCEDURE — 700102 HCHG RX REV CODE 250 W/ 637 OVERRIDE(OP): Performed by: INTERNAL MEDICINE

## 2022-05-20 PROCEDURE — 700102 HCHG RX REV CODE 250 W/ 637 OVERRIDE(OP): Performed by: STUDENT IN AN ORGANIZED HEALTH CARE EDUCATION/TRAINING PROGRAM

## 2022-05-20 PROCEDURE — 86480 TB TEST CELL IMMUN MEASURE: CPT

## 2022-05-20 PROCEDURE — 99232 SBSQ HOSP IP/OBS MODERATE 35: CPT | Performed by: INTERNAL MEDICINE

## 2022-05-20 PROCEDURE — 700111 HCHG RX REV CODE 636 W/ 250 OVERRIDE (IP): Performed by: INTERNAL MEDICINE

## 2022-05-20 PROCEDURE — A9270 NON-COVERED ITEM OR SERVICE: HCPCS | Performed by: STUDENT IN AN ORGANIZED HEALTH CARE EDUCATION/TRAINING PROGRAM

## 2022-05-20 PROCEDURE — A9270 NON-COVERED ITEM OR SERVICE: HCPCS | Performed by: INTERNAL MEDICINE

## 2022-05-20 PROCEDURE — 36415 COLL VENOUS BLD VENIPUNCTURE: CPT

## 2022-05-20 PROCEDURE — 700105 HCHG RX REV CODE 258: Performed by: INTERNAL MEDICINE

## 2022-05-20 RX ORDER — MORPHINE SULFATE 10 MG/ML
10 INJECTION, SOLUTION INTRAMUSCULAR; INTRAVENOUS
Status: DISCONTINUED | OUTPATIENT
Start: 2022-05-20 | End: 2022-05-26

## 2022-05-20 RX ORDER — LORAZEPAM 2 MG/ML
1 CONCENTRATE ORAL
Status: DISCONTINUED | OUTPATIENT
Start: 2022-05-20 | End: 2022-06-02

## 2022-05-20 RX ORDER — MORPHINE SULFATE 10 MG/ML
5 INJECTION, SOLUTION INTRAMUSCULAR; INTRAVENOUS
Status: DISCONTINUED | OUTPATIENT
Start: 2022-05-20 | End: 2022-05-26

## 2022-05-20 RX ORDER — SCOLOPAMINE TRANSDERMAL SYSTEM 1 MG/1
1 PATCH, EXTENDED RELEASE TRANSDERMAL
Status: DISCONTINUED | OUTPATIENT
Start: 2022-05-20 | End: 2022-06-17 | Stop reason: HOSPADM

## 2022-05-20 RX ORDER — GLYCOPYRROLATE 0.2 MG/ML
0.2 INJECTION INTRAMUSCULAR; INTRAVENOUS 3 TIMES DAILY PRN
Status: DISCONTINUED | OUTPATIENT
Start: 2022-05-20 | End: 2022-06-17 | Stop reason: HOSPADM

## 2022-05-20 RX ORDER — GLYCOPYRROLATE 1 MG/1
1 TABLET ORAL 3 TIMES DAILY PRN
Status: DISCONTINUED | OUTPATIENT
Start: 2022-05-20 | End: 2022-06-17 | Stop reason: HOSPADM

## 2022-05-20 RX ORDER — LORAZEPAM 2 MG/ML
1 INJECTION INTRAMUSCULAR
Status: DISCONTINUED | OUTPATIENT
Start: 2022-05-20 | End: 2022-06-02

## 2022-05-20 RX ORDER — ATROPINE SULFATE 10 MG/ML
2 SOLUTION/ DROPS OPHTHALMIC EVERY 4 HOURS PRN
Status: DISCONTINUED | OUTPATIENT
Start: 2022-05-20 | End: 2022-05-26

## 2022-05-20 RX ADMIN — MEROPENEM 500 MG: 500 INJECTION, POWDER, FOR SOLUTION INTRAVENOUS at 00:11

## 2022-05-20 RX ADMIN — ATORVASTATIN CALCIUM 20 MG: 20 TABLET, FILM COATED ORAL at 16:39

## 2022-05-20 RX ADMIN — METOPROLOL SUCCINATE 25 MG: 25 TABLET, EXTENDED RELEASE ORAL at 05:21

## 2022-05-20 RX ADMIN — FUROSEMIDE 20 MG: 20 TABLET ORAL at 05:21

## 2022-05-20 RX ADMIN — BUDESONIDE AND FORMOTEROL FUMARATE DIHYDRATE 2 PUFF: 80; 4.5 AEROSOL RESPIRATORY (INHALATION) at 05:21

## 2022-05-20 RX ADMIN — MEROPENEM 500 MG: 500 INJECTION, POWDER, FOR SOLUTION INTRAVENOUS at 05:21

## 2022-05-20 RX ADMIN — TIOTROPIUM BROMIDE INHALATION SPRAY 5 MCG: 3.12 SPRAY, METERED RESPIRATORY (INHALATION) at 05:21

## 2022-05-20 RX ADMIN — SENNOSIDES AND DOCUSATE SODIUM 2 TABLET: 50; 8.6 TABLET ORAL at 05:21

## 2022-05-20 RX ADMIN — MEROPENEM 500 MG: 500 INJECTION, POWDER, FOR SOLUTION INTRAVENOUS at 10:56

## 2022-05-20 RX ADMIN — SCOPALAMINE 1 PATCH: 1 PATCH, EXTENDED RELEASE TRANSDERMAL at 13:10

## 2022-05-20 ASSESSMENT — ENCOUNTER SYMPTOMS
WEAKNESS: 0
NECK PAIN: 0
CHILLS: 0
PALPITATIONS: 0
GASTROINTESTINAL NEGATIVE: 1
BRUISES/BLEEDS EASILY: 0
RESPIRATORY NEGATIVE: 1
COUGH: 0
VOMITING: 0
CARDIOVASCULAR NEGATIVE: 1
DEPRESSION: 0
BACK PAIN: 0
FOCAL WEAKNESS: 1
NAUSEA: 0
POLYDIPSIA: 0
FEVER: 1
MYALGIAS: 1
HEADACHES: 1
NERVOUS/ANXIOUS: 1
HEARTBURN: 0
EYES NEGATIVE: 1
DIZZINESS: 0

## 2022-05-20 ASSESSMENT — PAIN DESCRIPTION - PAIN TYPE
TYPE: ACUTE PAIN
TYPE: ACUTE PAIN

## 2022-05-20 ASSESSMENT — FIBROSIS 4 INDEX: FIB4 SCORE: 9.13

## 2022-05-20 NOTE — THERAPY
Occupational Therapy Contact Note:     05/20/22 7170   Interdisciplinary Plan of Care Collaboration   Collaboration Comments Pt has transitioned to comfort care, will DC from OT services.       Shantel Crews, OTR/L

## 2022-05-20 NOTE — PROGRESS NOTES
Per notes the patient is transitioned to comfort care and the plan is to discharge on hospice.  Recommend stopping antibiotics in accordance with the patient's wishes.    ID will sign off.    Gaviota Champion MD

## 2022-05-20 NOTE — DISCHARGE PLANNING
Received Choice form at 3120  Agency/Facility Name: [facility]   Referral sent per Choice form @ [phone]  Via Epic response [facility] accepted Pt.     BECKY RN notified.          0 = independent

## 2022-05-20 NOTE — PROGRESS NOTES
Palliative Care   Patient sleeping. Per LCSW/therapist Izabella Bermeo PhD, patient considering accepting hospice. No Episode/encounter notes located from hospice. Reached out to [facility] hospice liaison who confirmed they do not have an order. Given the patient found it helpful to speak about consideration of hospice with Izabella and thought she had spoken with [facility] liaison yesterday, will place End of Life Care Discussion order for hospice information from [facility] liaison.     Yisel Joseph, [email or web address].  Palliative Care Nurse Practitioner  [phone]

## 2022-05-20 NOTE — CARE PLAN
The patient is Stable - Low risk of patient condition declining or worsening    Shift Goals  Clinical Goals: pain mng, comfort, rest  Patient Goals: comfort , rest, watch VS  Family Goals: siddharth    Progress made toward(s) clinical / shift goals:      Problem: Knowledge Deficit - Standard  Goal: Patient and family/care givers will demonstrate understanding of plan of care, disease process/condition, diagnostic tests and medications  Outcome: Progressing     Problem: Pain - Standard  Goal: Alleviation of pain or a reduction in pain to the patient’s comfort goal  Outcome: Progressing     Problem: Skin Integrity  Goal: Skin integrity is maintained or improved  Outcome: Progressing     Problem: Fall Risk  Goal: Patient will remain free from falls  Outcome: Progressing       Patient is not progressing towards the following goals:

## 2022-05-20 NOTE — DISCHARGE PLANNING
Case Management Discharge Planning    Admission Date: 5/14/2022  GMLOS: 3.4  ALOS: 6    6-Clicks ADL Score: 16  6-Clicks Mobility Score: 13  PT and/or OT Eval ordered: Yes  Post-acute Referrals Ordered: Yes  Post-acute Choice Obtained: Yes  Has referral(s) been sent to post-acute provider:  Yes      Anticipated Discharge Dispo: Discharge Disposition: D/T to SNF with Medicare cert in anticipation of skilled care (03)    DME Needed: No    Action(s) Taken: OTHER chart review    Escalations Completed: None    Medically Clear: No    Next Steps:  f/u with medical and nursing teams regarding treatment plan, discharge planning needs and barriers.     Barriers to Discharge: Medical clearance    Is the patient up for discharge: No

## 2022-05-20 NOTE — PROGRESS NOTES
END OF SHIFT    Pt tearful at times today.  Pt's mother attempted to call today - explained the patient is stable & couldn't elaborate more.  pt AAOx4. pt resting, NADN. bed in lowest position & call light within reach.     Vitals:    05/19/22 1935   BP: (!) 93/69   Pulse: 84   Resp: 18   Temp: 36.1 °C (97 °F)   SpO2: 94%     Shellie Soriano RN

## 2022-05-20 NOTE — CARE PLAN
Problem: Nutritional:  Goal: Achieve adequate nutritional intake  Description: Patient will consume 50% of meals and supplements  Outcome: Not Progressing   PO intake 25-50% x 3 days w/ magic cups.

## 2022-05-20 NOTE — PROGRESS NOTES
[facility] Medicine Daily Progress Note    Date of Service  5/20/2022    Chief Complaint  Maral Herrera is a 56 y.o. female admitted 5/14/2022 with severe epistaxis for 3 days    Hospital Course  This is a 56-year-old female with a history of a brain tumor, alcohol use, methamphetamine use, severe dilated cardiomyopathy presumed from methamphetamine use, residing at [facility], had a reported ground-level fall approximately a month ago with periorbital swelling and maxillary and sphenoid ecchymosis, the patient is on aspirin as well as Xarelto at the nursing facility and develops a significant episode of epistaxis.  The patient has a cardiomyopathy with ejection fraction of 15%.  The patient was seen in this facility at the beginning of April, cardiology was consulted, the patient at the time positive for methamphetamine.  The patient has a history of longstanding alcohol abuse.  She does have cirrhosis and reportedly has a TIPS in place.  The patient was on Xarelto what appears to be for DVT prophylaxis at the nursing facility as well as aspirin for her heart condition.  The patient had reversal of her anticoagulation with Kcentra.  I had a conversation with the patient's mother today who gave additional history.  Apparently she had been diagnosed with a brain tumor and during the surgery the patient had severe bleeding and the procedure was aborted.  A recent CT scan of the brain shows encephalomalacia but no definitive tumor residual.  Patient is found to have a UTI, she is febrile and was started on empiric antibiotics with cultures pending.    Interval Problem Update  Patient seen and examined today.  Data, Medication data reviewed.  Case discussed with nursing as available.  Plan of Care reviewed with patient and notified of changes.  5/15, the patient is complaining of achiness, pain in her legs and arms, she is unwell, currently no further nosebleed noted.  T-max 100.3, heart rate in the  90s, respiration unlabored, 4 L nasal cannula oxygen, blood pressure initially down to 70/50, currently 104 with 60, given limited amount of fluid resuscitation secondary to the patient's poor ejection fraction.  Hemoglobin had been stable, the patient with significant hyponatremia, prerenal azotemia, total bilirubin of 4.6, albumin 2.1.  No iron deficiency identified, the patient severely coagulopathic initially elevated INR of 9.32, her TEG showed a platelet inhibition also, elevated procalcitonin,  Chest x-ray without cardiopulmonary disease, cardiomegaly is noted  5/16/2022:  Continue present medical management continue with ceftriaxone patient does have positive blood culture bottles gram-negative rods in addition to urinary culture is also positive for E. coli 10-50,000 colonies.  Repeat labs in the morning.  Patient with a MELD score of approximately 27.  Prognosis extremely guarded.  5/17: Patient seen and examined, afebrile, resting in bed, blood cultures 1/2 growing gram negative rods. Urine cultures growing ESBL. I have consulted infection disease  He prognosis is extremely guarded  Consulted palliative  5/18: No overnight events, afebrile, no nausea or vomiting. Now on meropenem for the ESBL in the urin and also in blood  ID following appreciate rec.  Due to her liver failure prognosis is extremely guarded  5/19: Patient resting in bed, afebrile, no overnight events, cont on meropenem as per ID rec.   Prognosis is poor palliative team following   5/20: Patient seen and examined resting in bed, Discussed with patiet again about her severe guarded prognosis due to HF, liver failure and also now with bacteremia.  Patient understand and open to talk with hospice.  Patient  discussed with hospice today and has chosen to transition to comfort care measures     I have personally seen and examined the patient at bedside. I discussed the plan of care with patient, bedside RN, charge RN, , pharmacy and  infectious disease.    Consultants/Specialty  critical care  Infection disease     Code Status  Comfort Care/DNR    Disposition  Patient is not medically cleared for discharge.   Anticipate discharge to to skilled nursing facility.  I have placed the appropriate orders for post-discharge needs.    Review of Systems  Review of Systems   Constitutional: Positive for fever and malaise/fatigue. Negative for chills.   HENT: Positive for nosebleeds.    Eyes: Negative.    Respiratory: Negative.  Negative for cough.    Cardiovascular: Negative.  Negative for chest pain and palpitations.   Gastrointestinal: Negative.  Negative for heartburn, nausea and vomiting.   Genitourinary: Positive for urgency. Negative for dysuria and frequency.   Musculoskeletal: Positive for myalgias. Negative for back pain and neck pain.   Skin: Negative.  Negative for itching and rash.   Neurological: Positive for focal weakness and headaches. Negative for dizziness and weakness.   Endo/Heme/Allergies: Negative.  Negative for polydipsia. Does not bruise/bleed easily.   Psychiatric/Behavioral: Negative for depression. The patient is nervous/anxious.         Physical Exam  Temp:  [36.1 °C (97 °F)-36.7 °C (98 °F)] 36.3 °C (97.3 °F)  Pulse:  [73-86] 83  Resp:  [18] 18  BP: ()/(66-71) 100/67  SpO2:  [90 %-97 %] 93 %    Physical Exam  Vitals and nursing note reviewed.   Constitutional:       Appearance: She is well-developed and overweight. She is ill-appearing. She is not diaphoretic.      Interventions: Nasal cannula in place.   HENT:      Head: Normocephalic and atraumatic.      Comments: Ecchymosis, bruising     Nose: Nose normal.   Eyes:      Conjunctiva/sclera: Conjunctivae normal.      Pupils: Pupils are equal, round, and reactive to light.   Neck:      Thyroid: No thyromegaly.      Vascular: No JVD.   Cardiovascular:      Rate and Rhythm: Normal rate and regular rhythm.      Heart sounds: Normal heart sounds.     No friction rub. No  gallop.   Pulmonary:      Effort: Pulmonary effort is normal.      Breath sounds: Normal breath sounds. No wheezing or rales.   Abdominal:      General: Bowel sounds are normal. There is no distension.      Palpations: Abdomen is soft. There is no mass.      Tenderness: There is no abdominal tenderness. There is no guarding or rebound.   Musculoskeletal:         General: Tenderness present. Normal range of motion.      Cervical back: Normal range of motion and neck supple.   Lymphadenopathy:      Cervical: No cervical adenopathy.   Skin:     General: Skin is warm and dry.      Coloration: Skin is jaundiced.      Findings: Bruising, lesion and rash present.   Neurological:      Mental Status: She is oriented to person, place, and time. She is lethargic.      Cranial Nerves: No cranial nerve deficit.   Psychiatric:         Behavior: Behavior normal.         Fluids    Intake/Output Summary (Last 24 hours) at 5/20/2022 1219  Last data filed at 5/20/2022 1100  Gross per 24 hour   Intake 120 ml   Output 1700 ml   Net -1580 ml       Laboratory  Recent Labs     05/18/22 0226 05/19/22  0248   WBC 6.1 4.1*   RBC 3.12* 2.83*   HEMOGLOBIN 11.1* 9.8*   HEMATOCRIT 32.4* 28.7*   .8* 101.4*   MCH 35.6* 34.6*   MCHC 34.3 34.1   RDW 62.3* 60.5*   PLATELETCT 86* 91*   MPV 10.6 11.7     Recent Labs     05/18/22 0226 05/19/22  0248   SODIUM 125* 127*   POTASSIUM 4.1 4.5   CHLORIDE 96 99   CO2 22 22   GLUCOSE 138* 148*   BUN 18 15   CREATININE 0.60 0.61   CALCIUM 7.6* 7.4*                   Imaging  DX-CHEST-PORTABLE (1 VIEW)   Final Result         1.  No acute cardiopulmonary disease.   2.  Cardiomegaly   3.  Atherosclerosis           Assessment/Plan  * Epistaxis- (present on admission)  Assessment & Plan  Significant supratherapeutic INR appreciated on labs and we will hold Xarelto and administer Kcentra  Stopped for the time being  Recheck coags, hold antiplatelet and anticoagulation for the time  being    Bacteremia  Assessment & Plan  Blood cultures 1/2 growing gram negative rods  Urine cultures growing ESBL  ID consulted appreciate rec.     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 coli 10-50,000 colonies in addition patient does have positive blood culture bottle for gram-negative rods.  Continue ceftriaxone 1 g continue fluid resuscitation as needed be mindful of fluid resuscitation the patient has decreased albumin likely will end up start third spacing.  Patient continues to have elevated INR no overt signs of bleeding.  Patient with MELD score of 27.    Patient now transitioned to comfort care     Supratherapeutic INR- (present on admission)  Assessment & Plan  Greater than 9 with active bleeding we will administer PCC at this time    Anemia- (present on admission)  Assessment & Plan  Transfuse hemoglobin less than 7        Advance care planning- (present on admission)  Assessment & Plan    CODE STATUS obtained at bedside and patient wishes for DO NOT RESUSCITATE DO NOT INTUBATE CODE STATUS with nurse present/intensivist present and myself and she is alert and oriented x4.    Patient now decided to transition to comfort care     Prolonged Q-T interval on ECG- (present on admission)  Assessment & Plan  Avoid QTC prolonging agents  Ativan for nausea vomiting    Thrombocytopenia (HCC)- (present on admission)  Assessment & Plan  Platelet admission secondary to  aspirin use  Current bleeding has stopped, monitor  Consider platelet transfusion if rebleeding      Severe protein-calorie malnutrition (HCC)- (present on admission)  Assessment & Plan  Dietary consult    Dilated cardiomyopathy (HCC)- (present on admission)  Assessment & Plan  Likely from longstanding methamphetamine abuse/alcoholism leading to her dilated cardiomyopathy.  Last ejection fraction 15% given 2 L in ED  Resume medication regimen once the patient tolerates    Patient transitioned to comfort care     Macrocytosis- (present on admission)  Assessment & Plan  Secondary to longstanding alcoholism      Methamphetamine abuse (HCC)- (present on admission)  Assessment & Plan  Urine drug test ordered and pending  Dilated cardiomyopathy appreciated in history and last echocardiogram ejection fraction 15%    Patient now transitioned to comfort care     Liver cirrhosis (HCC)- (present on admission)  Assessment & Plan  With elevated bilirubin, longstanding alcohol history  MELD score of 28    Patient has transitioned to comfort care today        Medically complex high risk patient    VTE prophylaxis: pharmacologic prophylaxis contraindicated due to Bleeding    I have performed a physical exam and reviewed and updated ROS and Plan today (5/20/2022). In review of yesterday's note (5/19/2022), there are no changes except as documented above.

## 2022-05-20 NOTE — HOSPICE
Pt agreeable to hospice & she wants the hospital to focus on her comfort until we can figure out a discharge plan.  She understands that the hospital will stop treating her for any medical problems & only focus on treatment.  Reviewed twice with her & she agreed both times.    Dr. Alicea notified & requested quantiferon for potential group home placement.

## 2022-05-20 NOTE — DISCHARGE PLANNING
Case Management Discharge Planning    Admission Date: 5/14/2022  GMLOS: 3.4  ALOS: 6    6-Clicks ADL Score: 16  6-Clicks Mobility Score: 13  PT and/or OT Eval ordered: Yes  Post-acute Referrals Ordered: Yes  Post-acute Choice Obtained: Yes  Has referral(s) been sent to post-acute provider:  Yes      Anticipated Discharge Dispo: Discharge Disposition: D/T to SNF with Medicare cert in anticipation of skilled care (03)    DME Needed: No    Action(s) Taken: Choice obtained and Referral(s) sent    Escalations Completed: None    Medically Clear: Yes    Next Steps: Choice Options given to patient.  Pt states she only is concerned that agency is comfortable with her pet dachsund being in the home when they visit.  CM stated will send referral to Hospice agencies.  [facility] updated and agreed to evaluate patient.    Barriers to Discharge: Pending Placement    Is the patient up for discharge tomorrow: Pt ready for discharge once hospice agency accepts patient.

## 2022-05-20 NOTE — PROGRESS NOTES
Spiritual Care Note    Patient Information     Patient's Name: Maral Herrera   MRN: [id number]    YOB: 1965   Age and Gender: 56 y.o. female   Service Area: [facility]   Room (and Bed): [facility]/   Ethnicity or Nationality: white   Primary Language: English   Sikh/Spiritual preference: Sabianism   Place of Residence: [place]   Code Status: Comfort Care/DNR    Date of Admission: 5/14/2022   Length of Stay: 6 days        Spiritual Care Provider Information:   Intern Charanjit Schmid  [phone]  [email or web address]  Total time : 5 minutes      Palliative Care  PC Sikh/Spiritual Screening  Is your spiritual health or inner well-being important to you as you cope with your medical condition?: No  Would you like to receive a visit from our Spiritual Care team or your own Christian or spiritual leader?: No      Request Information  Visited With: Patient  Nature of the Visit: Initial, On shift  General Visit: Yes       Encounter Information  Observations/Symptoms: Sadness, Tearful  Interaction/Conversation: Pt trying to make decisions about hospice and coming to terms with comfort care. Pt expressed being lonely but not quite being ready to discuss these things.  Assessment: Need, Distress, Despair  Need: Decision Making  Distress: Grief/Loss/Bereavement, Anxiety about the Future, Upsetting Diagnosis/Prognosis  Despair: Fear of Death, Alienation, Loneliness  Intended Effects: Promote a Sense of Peace, Journeying With Someone in Grief Process, Convey a Calming Presence  Interventions: Compassionate Presence, Empathetic Listening  Outcomes: Progress with Grief, Spiritual Comfort  Plan: Visit Upon Request    Notes:

## 2022-05-20 NOTE — THERAPY
Per EMR, pt has transitioned to comfort care. Acute PT will sign off at this time. Please reorder PT should pt's status change. Thanks    Mariaelena Solis, PT, DPT    [place] [id number]

## 2022-05-20 NOTE — CARE PLAN
The patient is Stable - Low risk of patient condition declining or worsening    Shift Goals  Clinical Goals: Pain management, Monitor VS and labs, Safety  Patient Goals: Comfort  Family Goals: MORALES    Progress made toward(s) clinical / shift goals:    Problem: Knowledge Deficit - Standard  Goal: Patient and family/care givers will demonstrate understanding of plan of care, disease process/condition, diagnostic tests and medications  Outcome: Progressing     Problem: Pain - Standard  Goal: Alleviation of pain or a reduction in pain to the patient’s comfort goal  Outcome: Progressing     Problem: Skin Integrity  Goal: Skin integrity is maintained or improved  Outcome: Progressing     Problem: Fall Risk  Goal: Patient will remain free from falls  Outcome: Progressing

## 2022-05-20 NOTE — PROGRESS NOTES
Bedside report received and patient care assumed. Pt is resting in bed, A&Ox4, with no complaints of pain, and is on RA. Tele box on. All fall precautions are in place, belongings at bedside table.  Pt was updated on POC, no questions or concerns. Pt educated on use of call light for assistance.

## 2022-05-21 PROCEDURE — 770001 HCHG ROOM/CARE - MED/SURG/GYN PRIV*

## 2022-05-21 PROCEDURE — 99231 SBSQ HOSP IP/OBS SF/LOW 25: CPT | Performed by: INTERNAL MEDICINE

## 2022-05-21 PROCEDURE — 700102 HCHG RX REV CODE 250 W/ 637 OVERRIDE(OP): Performed by: STUDENT IN AN ORGANIZED HEALTH CARE EDUCATION/TRAINING PROGRAM

## 2022-05-21 PROCEDURE — A9270 NON-COVERED ITEM OR SERVICE: HCPCS | Performed by: STUDENT IN AN ORGANIZED HEALTH CARE EDUCATION/TRAINING PROGRAM

## 2022-05-21 RX ADMIN — METOPROLOL SUCCINATE 25 MG: 25 TABLET, EXTENDED RELEASE ORAL at 05:19

## 2022-05-21 RX ADMIN — ATORVASTATIN CALCIUM 20 MG: 20 TABLET, FILM COATED ORAL at 18:01

## 2022-05-21 RX ADMIN — FUROSEMIDE 20 MG: 20 TABLET ORAL at 05:19

## 2022-05-21 ASSESSMENT — ENCOUNTER SYMPTOMS
NERVOUS/ANXIOUS: 1
DEPRESSION: 0
GASTROINTESTINAL NEGATIVE: 1
CARDIOVASCULAR NEGATIVE: 1
NAUSEA: 0
NECK PAIN: 0
BRUISES/BLEEDS EASILY: 0
VOMITING: 0
EYES NEGATIVE: 1
FOCAL WEAKNESS: 1
RESPIRATORY NEGATIVE: 1
HEARTBURN: 0
HEADACHES: 1
BACK PAIN: 0
COUGH: 0
PALPITATIONS: 0
CHILLS: 0
POLYDIPSIA: 0
FEVER: 1
MYALGIAS: 1
WEAKNESS: 0
DIZZINESS: 0

## 2022-05-21 ASSESSMENT — FIBROSIS 4 INDEX: FIB4 SCORE: 9.13

## 2022-05-21 NOTE — CARE PLAN
The patient is Watcher - Medium risk of patient condition declining or worsening    Shift Goals  Clinical Goals: Pt comfort, Pain management, Monitor vs  Patient Goals: Comfort  Family Goals: MORALES    Progress made toward(s) clinical / shift goals:    Problem: Knowledge Deficit - Standard  Goal: Patient and family/care givers will demonstrate understanding of plan of care, disease process/condition, diagnostic tests and medications  Outcome: Progressing     Problem: Pain - Standard  Goal: Alleviation of pain or a reduction in pain to the patient’s comfort goal  Outcome: Progressing     Problem: Skin Integrity  Goal: Skin integrity is maintained or improved  Outcome: Progressing     Problem: Fall Risk  Goal: Patient will remain free from falls  Outcome: Progressing

## 2022-05-21 NOTE — PROGRESS NOTES
[facility] Medicine Daily Progress Note    Date of Service  5/21/2022    Chief Complaint  Maral Herrera is a 56 y.o. female admitted 5/14/2022 with severe epistaxis for 3 days    Hospital Course  This is a 56-year-old female with a history of a brain tumor, alcohol use, methamphetamine use, severe dilated cardiomyopathy presumed from methamphetamine use, residing at [facility], had a reported ground-level fall approximately a month ago with periorbital swelling and maxillary and sphenoid ecchymosis, the patient is on aspirin as well as Xarelto at the nursing facility and develops a significant episode of epistaxis.  The patient has a cardiomyopathy with ejection fraction of 15%.  The patient was seen in this facility at the beginning of April, cardiology was consulted, the patient at the time positive for methamphetamine.  The patient has a history of longstanding alcohol abuse.  She does have cirrhosis and reportedly has a TIPS in place.  The patient was on Xarelto what appears to be for DVT prophylaxis at the nursing facility as well as aspirin for her heart condition.  The patient had reversal of her anticoagulation with Kcentra.  I had a conversation with the patient's mother today who gave additional history.  Apparently she had been diagnosed with a brain tumor and during the surgery the patient had severe bleeding and the procedure was aborted.  A recent CT scan of the brain shows encephalomalacia but no definitive tumor residual.  Patient is found to have a UTI, she is febrile and was started on empiric antibiotics with cultures pending.    Interval Problem Update  Patient seen and examined today.  Data, Medication data reviewed.  Case discussed with nursing as available.  Plan of Care reviewed with patient and notified of changes.  5/15, the patient is complaining of achiness, pain in her legs and arms, she is unwell, currently no further nosebleed noted.  T-max 100.3, heart rate in the  90s, respiration unlabored, 4 L nasal cannula oxygen, blood pressure initially down to 70/50, currently 104 with 60, given limited amount of fluid resuscitation secondary to the patient's poor ejection fraction.  Hemoglobin had been stable, the patient with significant hyponatremia, prerenal azotemia, total bilirubin of 4.6, albumin 2.1.  No iron deficiency identified, the patient severely coagulopathic initially elevated INR of 9.32, her TEG showed a platelet inhibition also, elevated procalcitonin,  Chest x-ray without cardiopulmonary disease, cardiomegaly is noted  5/16/2022:  Continue present medical management continue with ceftriaxone patient does have positive blood culture bottles gram-negative rods in addition to urinary culture is also positive for E. coli 10-50,000 colonies.  Repeat labs in the morning.  Patient with a MELD score of approximately 27.  Prognosis extremely guarded.  5/17: Patient seen and examined, afebrile, resting in bed, blood cultures 1/2 growing gram negative rods. Urine cultures growing ESBL. I have consulted infection disease  He prognosis is extremely guarded  Consulted palliative  5/18: No overnight events, afebrile, no nausea or vomiting. Now on meropenem for the ESBL in the urin and also in blood  ID following appreciate rec.  Due to her liver failure prognosis is extremely guarded  5/19: Patient resting in bed, afebrile, no overnight events, cont on meropenem as per ID rec.   Prognosis is poor palliative team following   5/20: Patient seen and examined resting in bed, Discussed with patiet again about her severe guarded prognosis due to HF, liver failure and also now with bacteremia.  Patient understand and open to talk with hospice.  Patient  discussed with hospice today and has chosen to transition to comfort care measures   5/21: Patient resting in bed, no overnight events, patient is now on comfort measures  Awaiting placement     I have personally seen and examined the  patient at bedside. I discussed the plan of care with patient, bedside RN, charge RN, , pharmacy and infectious disease.    Consultants/Specialty  critical care  Infection disease     Code Status  Comfort Care/DNR    Disposition  Patient is not medically cleared for discharge.   Anticipate discharge to to skilled nursing facility.  I have placed the appropriate orders for post-discharge needs.    Review of Systems  Review of Systems   Constitutional: Positive for fever and malaise/fatigue. Negative for chills.   HENT: Positive for nosebleeds.    Eyes: Negative.    Respiratory: Negative.  Negative for cough.    Cardiovascular: Negative.  Negative for chest pain and palpitations.   Gastrointestinal: Negative.  Negative for heartburn, nausea and vomiting.   Genitourinary: Positive for urgency. Negative for dysuria and frequency.   Musculoskeletal: Positive for myalgias. Negative for back pain and neck pain.   Skin: Negative.  Negative for itching and rash.   Neurological: Positive for focal weakness and headaches. Negative for dizziness and weakness.   Endo/Heme/Allergies: Negative.  Negative for polydipsia. Does not bruise/bleed easily.   Psychiatric/Behavioral: Negative for depression. The patient is nervous/anxious.         Physical Exam  Temp:  [36.2 °C (97.1 °F)-36.5 °C (97.7 °F)] 36.5 °C (97.7 °F)  Pulse:  [75-91] 75  Resp:  [17-18] 17  BP: (102-110)/(55-60) 110/55  SpO2:  [93 %-95 %] 93 %    Physical Exam  Vitals and nursing note reviewed.   Constitutional:       Appearance: She is well-developed and overweight. She is ill-appearing. She is not diaphoretic.      Interventions: Nasal cannula in place.   HENT:      Head: Normocephalic and atraumatic.      Comments: Ecchymosis, bruising     Nose: Nose normal.   Eyes:      Conjunctiva/sclera: Conjunctivae normal.      Pupils: Pupils are equal, round, and reactive to light.   Neck:      Thyroid: No thyromegaly.      Vascular: No JVD.   Cardiovascular:       Rate and Rhythm: Normal rate and regular rhythm.      Heart sounds: Normal heart sounds.     No friction rub. No gallop.   Pulmonary:      Effort: Pulmonary effort is normal.      Breath sounds: Normal breath sounds. No wheezing or rales.   Abdominal:      General: Bowel sounds are normal. There is no distension.      Palpations: Abdomen is soft. There is no mass.      Tenderness: There is no abdominal tenderness. There is no guarding or rebound.   Musculoskeletal:         General: Tenderness present. Normal range of motion.      Cervical back: Normal range of motion and neck supple.   Lymphadenopathy:      Cervical: No cervical adenopathy.   Skin:     General: Skin is warm and dry.      Coloration: Skin is jaundiced.      Findings: Bruising, lesion and rash present.   Neurological:      Mental Status: She is oriented to person, place, and time. She is lethargic.      Cranial Nerves: No cranial nerve deficit.   Psychiatric:         Behavior: Behavior normal.         Fluids    Intake/Output Summary (Last 24 hours) at 5/21/2022 1119  Last data filed at 5/21/2022 0500  Gross per 24 hour   Intake 100 ml   Output 950 ml   Net -850 ml       Laboratory  Recent Labs     05/19/22  0248   WBC 4.1*   RBC 2.83*   HEMOGLOBIN 9.8*   HEMATOCRIT 28.7*   .4*   MCH 34.6*   MCHC 34.1   RDW 60.5*   PLATELETCT 91*   MPV 11.7     Recent Labs     05/19/22  0248   SODIUM 127*   POTASSIUM 4.5   CHLORIDE 99   CO2 22   GLUCOSE 148*   BUN 15   CREATININE 0.61   CALCIUM 7.4*                   Imaging  DX-CHEST-PORTABLE (1 VIEW)   Final Result         1.  No acute cardiopulmonary disease.   2.  Cardiomegaly   3.  Atherosclerosis           Assessment/Plan  * Epistaxis- (present on admission)  Assessment & Plan  Significant supratherapeutic INR appreciated on labs and we will hold Xarelto and administer Kcentra  Stopped for the time being  Recheck coags, hold antiplatelet and anticoagulation for the time being    Bacteremia  Assessment &  Plan  Blood cultures 1/2 growing gram negative rods  Urine cultures growing ESBL  ID consulted appreciate rec.     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 coli 10-50,000 colonies in addition patient does have positive blood culture bottle for gram-negative rods.  Continue ceftriaxone 1 g continue fluid resuscitation as needed be mindful of fluid resuscitation the patient has decreased albumin likely will end up start third spacing.  Patient continues to have elevated INR no overt signs of bleeding.  Patient with MELD score of 27.    Patient now transitioned to comfort care     Supratherapeutic INR- (present on admission)  Assessment & Plan  Greater than 9 with active bleeding we will administer PCC at this time    Anemia- (present on admission)  Assessment & Plan  Transfuse hemoglobin less than 7        Advance care planning- (present on admission)  Assessment & Plan    CODE STATUS obtained at bedside and patient wishes for DO NOT RESUSCITATE DO NOT INTUBATE CODE STATUS with nurse present/intensivist present and myself and she is alert and oriented x4.    Patient now decided to transition to comfort care     Prolonged Q-T interval on ECG- (present on admission)  Assessment & Plan  Avoid QTC prolonging agents  Ativan for nausea vomiting    Thrombocytopenia (HCC)- (present on admission)  Assessment & Plan  Platelet admission secondary to aspirin use  Current bleeding has  stopped, monitor  Consider platelet transfusion if rebleeding      Severe protein-calorie malnutrition (HCC)- (present on admission)  Assessment & Plan  Dietary consult    Dilated cardiomyopathy (HCC)- (present on admission)  Assessment & Plan  Likely from longstanding methamphetamine abuse/alcoholism leading to her dilated cardiomyopathy.  Last ejection fraction 15% given 2 L in ED  Resume medication regimen once the patient tolerates    Patient transitioned to comfort care     Macrocytosis- (present on admission)  Assessment & Plan  Secondary to longstanding alcoholism      Methamphetamine abuse (HCC)- (present on admission)  Assessment & Plan  Urine drug test ordered and pending  Dilated cardiomyopathy appreciated in history and last echocardiogram ejection fraction 15%    Patient now transitioned to comfort care     Liver cirrhosis (HCC)- (present on admission)  Assessment & Plan  With elevated bilirubin, longstanding alcohol history  MELD score of 28    Patient has transitioned to comfort care today        Medically complex high risk patient    VTE prophylaxis: pharmacologic prophylaxis contraindicated due to Bleeding    I have performed a physical exam and reviewed and updated ROS and Plan today (5/21/2022). In review of yesterday's note (5/20/2022), there are no changes except as documented above.

## 2022-05-22 LAB
BACTERIA BLD CULT: NORMAL
BACTERIA BLD CULT: NORMAL
SIGNIFICANT IND 70042: NORMAL
SIGNIFICANT IND 70042: NORMAL
SITE SITE: NORMAL
SITE SITE: NORMAL
SOURCE SOURCE: NORMAL
SOURCE SOURCE: NORMAL

## 2022-05-22 PROCEDURE — 770001 HCHG ROOM/CARE - MED/SURG/GYN PRIV*

## 2022-05-22 PROCEDURE — A9270 NON-COVERED ITEM OR SERVICE: HCPCS | Performed by: STUDENT IN AN ORGANIZED HEALTH CARE EDUCATION/TRAINING PROGRAM

## 2022-05-22 PROCEDURE — 700102 HCHG RX REV CODE 250 W/ 637 OVERRIDE(OP): Performed by: STUDENT IN AN ORGANIZED HEALTH CARE EDUCATION/TRAINING PROGRAM

## 2022-05-22 PROCEDURE — 700111 HCHG RX REV CODE 636 W/ 250 OVERRIDE (IP): Performed by: INTERNAL MEDICINE

## 2022-05-22 PROCEDURE — 99231 SBSQ HOSP IP/OBS SF/LOW 25: CPT | Performed by: INTERNAL MEDICINE

## 2022-05-22 RX ADMIN — FUROSEMIDE 20 MG: 20 TABLET ORAL at 06:41

## 2022-05-22 RX ADMIN — MORPHINE SULFATE 5 MG: 10 INJECTION INTRAVENOUS at 16:09

## 2022-05-22 RX ADMIN — MORPHINE SULFATE 5 MG: 10 INJECTION INTRAVENOUS at 13:42

## 2022-05-22 ASSESSMENT — ENCOUNTER SYMPTOMS
BRUISES/BLEEDS EASILY: 0
WEAKNESS: 0
NAUSEA: 0
VOMITING: 0
FOCAL WEAKNESS: 1
DIZZINESS: 0
BACK PAIN: 0
NECK PAIN: 0
CARDIOVASCULAR NEGATIVE: 1
DEPRESSION: 0
POLYDIPSIA: 0
EYES NEGATIVE: 1
RESPIRATORY NEGATIVE: 1
MYALGIAS: 1
FEVER: 1
COUGH: 0
GASTROINTESTINAL NEGATIVE: 1
HEARTBURN: 0
HEADACHES: 1
CHILLS: 0
NERVOUS/ANXIOUS: 1
PALPITATIONS: 0

## 2022-05-22 NOTE — PROGRESS NOTES
Bedside report received and patient care assumed. Patient is resting in bed, A&OX4 on comfort measures, with no complaints of pain and is on RA. All fall precautions are in place, belongings at bedside table. Pt was updated on POC, no questions or concerns. Pt educated on use of call light for assistance.

## 2022-05-22 NOTE — CARE PLAN
The patient is Watcher - Medium risk of patient condition declining or worsening    Shift Goals  Clinical Goals: Comfort measures, pain management  Patient Goals: Sleep  Family Goals: MORALES, family not present    Progress made toward(s) clinical / shift goals:      Problem: Knowledge Deficit - Standard  Goal: Patient and family/care givers will demonstrate understanding of plan of care, disease process/condition, diagnostic tests and medications  Outcome: Progressing     Problem: Pain - Standard  Goal: Alleviation of pain or a reduction in pain to the patient’s comfort goal  Outcome: Progressing     Problem: Skin Integrity  Goal: Skin integrity is maintained or improved  Outcome: Progressing     Problem: Fall Risk  Goal: Patient will remain free from falls  Outcome: Progressing      Patient is not progressing towards the following goals:NA

## 2022-05-22 NOTE — PROGRESS NOTES
END OF SHIFT    *pt on comfort care  *pt on contact for ESBL of urine  No acute concerns today.  pt AAOx4. pt resting, NADN. bed in lowest position & call light within reach.    Vitals:    05/21/22 1600   BP: 136/55   Pulse: 77   Resp: 18   Temp: 37.2 °C (99 °F)   SpO2:      Shellie Soriano RN

## 2022-05-22 NOTE — PROGRESS NOTES
[facility] Medicine Daily Progress Note    Date of Service  5/22/2022    Chief Complaint  Maral Herrera is a 56 y.o. female admitted 5/14/2022 with severe epistaxis for 3 days    Hospital Course  This is a 56-year-old female with a history of a brain tumor, alcohol use, methamphetamine use, severe dilated cardiomyopathy presumed from methamphetamine use, residing at [facility], had a reported ground-level fall approximately a month ago with periorbital swelling and maxillary and sphenoid ecchymosis, the patient is on aspirin as well as Xarelto at the nursing facility and develops a significant episode of epistaxis.  The patient has a cardiomyopathy with ejection fraction of 15%.  The patient was seen in this facility at the beginning of April, cardiology was consulted, the patient at the time positive for methamphetamine.  The patient has a history of longstanding alcohol abuse.  She does have cirrhosis and reportedly has a TIPS in place.  The patient was on Xarelto what appears to be for DVT prophylaxis at the nursing facility as well as aspirin for her heart condition.  The patient had reversal of her anticoagulation with Kcentra.  I had a conversation with the patient's mother today who gave additional history.  Apparently she had been diagnosed with a brain tumor and during the surgery the patient had severe bleeding and the procedure was aborted.  A recent CT scan of the brain shows encephalomalacia but no definitive tumor residual.  Patient is found to have a UTI, she is febrile and was started on empiric antibiotics with cultures pending.    Interval Problem Update  Patient seen and examined today.  Data, Medication data reviewed.  Case discussed with nursing as available.  Plan of Care reviewed with patient and notified of changes.  5/15, the patient is complaining of achiness, pain in her legs and arms, she is unwell, currently no further nosebleed noted.  T-max 100.3, heart rate in the  90s, respiration unlabored, 4 L nasal cannula oxygen, blood pressure initially down to 70/50, currently 104 with 60, given limited amount of fluid resuscitation secondary to the patient's poor ejection fraction.  Hemoglobin had been stable, the patient with significant hyponatremia, prerenal azotemia, total bilirubin of 4.6, albumin 2.1.  No iron deficiency identified, the patient severely coagulopathic initially elevated INR of 9.32, her TEG showed a platelet inhibition also, elevated procalcitonin,  Chest x-ray without cardiopulmonary disease, cardiomegaly is noted  5/16/2022:  Continue present medical management continue with ceftriaxone patient does have positive blood culture bottles gram-negative rods in addition to urinary culture is also positive for E. coli 10-50,000 colonies.  Repeat labs in the morning.  Patient with a MELD score of approximately 27.  Prognosis extremely guarded.  5/17: Patient seen and examined, afebrile, resting in bed, blood cultures 1/2 growing gram negative rods. Urine cultures growing ESBL. I have consulted infection disease  He prognosis is extremely guarded  Consulted palliative  5/18: No overnight events, afebrile, no nausea or vomiting. Now on meropenem for the ESBL in the urin and also in blood  ID following appreciate rec.  Due to her liver failure prognosis is extremely guarded  5/19: Patient resting in bed, afebrile, no overnight events, cont on meropenem as per ID rec.   Prognosis is poor palliative team following   5/20: Patient seen and examined resting in bed, Discussed with patiet again about her severe guarded prognosis due to HF, liver failure and also now with bacteremia.  Patient understand and open to talk with hospice.  Patient  discussed with hospice today and has chosen to transition to comfort care measures   5/21: Patient resting in bed, no overnight events, patient is now on comfort measures  Awaiting placement   5/22:  Patient seen  And examined, on comfort  care measures , accepted by hospice awaiting placement     I have personally seen and examined the patient at bedside. I discussed the plan of care with patient, bedside RN, charge RN, , pharmacy and infectious disease.    Consultants/Specialty  critical care  Infection disease     Code Status  Comfort Care/DNR    Disposition  Patient is not medically cleared for discharge.   Anticipate discharge to to skilled nursing facility.  I have placed the appropriate orders for post-discharge needs.    Review of Systems  Review of Systems   Constitutional: Positive for fever and malaise/fatigue. Negative for chills.   HENT: Positive for nosebleeds.    Eyes: Negative.    Respiratory: Negative.  Negative for cough.    Cardiovascular: Negative.  Negative for chest pain and palpitations.   Gastrointestinal: Negative.  Negative for heartburn, nausea and vomiting.   Genitourinary: Positive for urgency. Negative for dysuria and frequency.   Musculoskeletal: Positive for myalgias. Negative for back pain and neck pain.   Skin: Negative.  Negative for itching and rash.   Neurological: Positive for focal weakness and headaches. Negative for dizziness and weakness.   Endo/Heme/Allergies: Negative.  Negative for polydipsia. Does not bruise/bleed easily.   Psychiatric/Behavioral: Negative for depression. The patient is nervous/anxious.         Physical Exam  Temp:  [36.4 °C (97.6 °F)-37.2 °C (99 °F)] 36.4 °C (97.6 °F)  Pulse:  [60-77] 64  Resp:  [17-18] 17  BP: (136)/(55) 136/55  SpO2:  [92 %-95 %] 95 %    Physical Exam  Vitals and nursing note reviewed.   Constitutional:       Appearance: She is well-developed and overweight. She is ill-appearing. She is not diaphoretic.      Interventions: Nasal cannula in place.   HENT:      Head: Normocephalic and atraumatic.      Comments: Ecchymosis, bruising     Nose: Nose normal.   Eyes:      Conjunctiva/sclera: Conjunctivae normal.      Pupils: Pupils are equal, round, and reactive to  light.   Neck:      Thyroid: No thyromegaly.      Vascular: No JVD.   Cardiovascular:      Rate and Rhythm: Normal rate and regular rhythm.      Heart sounds: Normal heart sounds.     No friction rub. No gallop.   Pulmonary:      Effort: Pulmonary effort is normal.      Breath sounds: Normal breath sounds. No wheezing or rales.   Abdominal:      General: Bowel sounds are normal. There is no distension.      Palpations: Abdomen is soft. There is no mass.      Tenderness: There is no abdominal tenderness. There is no guarding or rebound.   Musculoskeletal:         General: Tenderness present. Normal range of motion.      Cervical back: Normal range of motion and neck supple.   Lymphadenopathy:      Cervical: No cervical adenopathy.   Skin:     General: Skin is warm and dry.      Coloration: Skin is jaundiced.      Findings: Bruising, lesion and rash present.   Neurological:      Mental Status: She is oriented to person, place, and time. She is lethargic.      Cranial Nerves: No cranial nerve deficit.   Psychiatric:         Behavior: Behavior normal.         Fluids    Intake/Output Summary (Last 24 hours) at 5/22/2022 1157  Last data filed at 5/22/2022 0600  Gross per 24 hour   Intake --   Output 1500 ml   Net -1500 ml       Laboratory                        Imaging  DX-CHEST-PORTABLE (1 VIEW)   Final Result         1.  No acute cardiopulmonary disease.   2.  Cardiomegaly   3.  Atherosclerosis           Assessment/Plan  * Epistaxis- (present on admission)  Assessment & Plan  Significant supratherapeutic INR appreciated on labs and we will hold Xarelto and administer Kcentra  Stopped for the time being  Recheck coags, hold antiplatelet and anticoagulation for the time being    Bacteremia  Assessment & Plan  Blood cultures 1/2 growing gram negative rods  Urine cultures growing ESBL  ID consulted appreciate rec.     Patient now transitioned to comfort care     Benign neoplasm of supratentorial region of brain  (HCC)  Assessment & Plan  Reported by the patient's mother, unclear details apparently this was addressed in [place] in the past  Consider MRI for follow-up      Acute cystitis without hematuria  Assessment & Plan  Empiric antibiotics, cultures    Hyponatremia- (present on admission)  Assessment & Plan  Monitor      Septic shock (HCC)  Assessment & Plan  Fluid responsive shock in ED with 2 L of crystalloid fluid   Currently improved  5/16/2022:  Patient does have positive urine culture for E. coli 10-50,000 colonies in addition patient does have positive blood culture bottle for gram-negative rods.  Continue ceftriaxone 1 g continue fluid resuscitation as needed be mindful of fluid resuscitation the patient has decreased albumin likely will end up start third spacing.  Patient continues to have elevated INR no overt signs of bleeding.  Patient with MELD score of 27.    Patient now transitioned to comfort care     Supratherapeutic INR- (present on admission)  Assessment & Plan  Greater than 9 with active bleeding we will administer PCC at this time    Anemia- (present on admission)  Assessment & Plan  Transfuse hemoglobin less than 7        Advance care planning- (present on admission)  Assessment & Plan    CODE STATUS obtained at bedside and patient wishes for DO NOT RESUSCITATE DO NOT INTUBATE CODE STATUS with nurse present/intensivist present and myself and she is alert and oriented x4.    Patient now decided to transition to comfort care     Prolonged Q-T interval on ECG- (present on admission)  Assessment & Plan  Avoid QTC prolonging agents  Ativan for nausea vomiting    Thrombocytopenia (HCC)- (present on admission)  Assessment & Plan  Platelet admission secondary to aspirin use  Current bleeding has stopped, monitor  Consider platelet transfusion if rebleeding      Severe protein-calorie malnutrition (HCC)- (present on admission)  Assessment & Plan  Dietary consult    Dilated cardiomyopathy (HCC)- (present  on admission)  Assessment & Plan  Likely from longstanding methamphetamine abuse/alcoholism leading to her dilated cardiomyopathy.  Last ejection fraction 15% given 2 L in ED  Resume medication regimen once the patient tolerates    Patient transitioned to comfort care     Macrocytosis- (present on admission)  Assessment & Plan  Secondary to longstanding alcoholism      Methamphetamine abuse (HCC)- (present on admission)  Assessment & Plan  Urine drug test ordered and pending  Dilated cardiomyopathy appreciated in history and last echocardiogram ejection fraction 15%    Patient now transitioned to comfort care     Liver cirrhosis (HCC)- (present on admission)  Assessment & Plan  With elevated bilirubin, longstanding alcohol history  MELD score of 28    Patient has transitioned to comfort care today        Medically complex high risk patient    VTE prophylaxis: pharmacologic prophylaxis contraindicated due to Bleeding    I have performed a physical exam and reviewed and updated ROS and Plan today (5/22/2022). In review of yesterday's note (5/21/2022), there are no changes except as documented above.

## 2022-05-23 LAB
GAMMA INTERFERON BACKGROUND BLD IA-ACNC: 0.08 IU/ML
M TB IFN-G BLD-IMP: ABNORMAL
M TB IFN-G CD4+ BCKGRND COR BLD-ACNC: 0 IU/ML
MITOGEN IGNF BCKGRD COR BLD-ACNC: 0.49 IU/ML
QFT TB2 - NIL TBQ2: 0.02 IU/ML

## 2022-05-23 PROCEDURE — A9270 NON-COVERED ITEM OR SERVICE: HCPCS | Performed by: STUDENT IN AN ORGANIZED HEALTH CARE EDUCATION/TRAINING PROGRAM

## 2022-05-23 PROCEDURE — 770001 HCHG ROOM/CARE - MED/SURG/GYN PRIV*

## 2022-05-23 PROCEDURE — 99231 SBSQ HOSP IP/OBS SF/LOW 25: CPT | Performed by: INTERNAL MEDICINE

## 2022-05-23 PROCEDURE — 700102 HCHG RX REV CODE 250 W/ 637 OVERRIDE(OP): Performed by: INTERNAL MEDICINE

## 2022-05-23 PROCEDURE — 700111 HCHG RX REV CODE 636 W/ 250 OVERRIDE (IP): Performed by: INTERNAL MEDICINE

## 2022-05-23 PROCEDURE — A9270 NON-COVERED ITEM OR SERVICE: HCPCS | Performed by: INTERNAL MEDICINE

## 2022-05-23 PROCEDURE — 700102 HCHG RX REV CODE 250 W/ 637 OVERRIDE(OP): Performed by: STUDENT IN AN ORGANIZED HEALTH CARE EDUCATION/TRAINING PROGRAM

## 2022-05-23 RX ADMIN — MORPHINE SULFATE 10 MG: 10 INJECTION INTRAVENOUS at 22:41

## 2022-05-23 RX ADMIN — METOPROLOL SUCCINATE 25 MG: 25 TABLET, EXTENDED RELEASE ORAL at 05:58

## 2022-05-23 RX ADMIN — MORPHINE SULFATE 5 MG: 10 INJECTION INTRAVENOUS at 08:54

## 2022-05-23 RX ADMIN — FUROSEMIDE 20 MG: 20 TABLET ORAL at 05:58

## 2022-05-23 RX ADMIN — MORPHINE SULFATE 5 MG: 10 INJECTION INTRAVENOUS at 12:33

## 2022-05-23 RX ADMIN — ATORVASTATIN CALCIUM 20 MG: 20 TABLET, FILM COATED ORAL at 18:00

## 2022-05-23 RX ADMIN — SCOPALAMINE 1 PATCH: 1 PATCH, EXTENDED RELEASE TRANSDERMAL at 12:33

## 2022-05-23 RX ADMIN — MORPHINE SULFATE 10 MG: 10 INJECTION INTRAVENOUS at 20:09

## 2022-05-23 ASSESSMENT — PAIN DESCRIPTION - PAIN TYPE
TYPE: ACUTE PAIN

## 2022-05-23 ASSESSMENT — ENCOUNTER SYMPTOMS
POLYDIPSIA: 0
FEVER: 1
GASTROINTESTINAL NEGATIVE: 1
NERVOUS/ANXIOUS: 1
MYALGIAS: 1
HEADACHES: 1
BACK PAIN: 0
DIZZINESS: 0
FOCAL WEAKNESS: 1
BRUISES/BLEEDS EASILY: 0
COUGH: 0
CARDIOVASCULAR NEGATIVE: 1
EYES NEGATIVE: 1
VOMITING: 0
NECK PAIN: 0
CHILLS: 0
HEARTBURN: 0
NAUSEA: 0
WEAKNESS: 0
DEPRESSION: 0
PALPITATIONS: 0
RESPIRATORY NEGATIVE: 1

## 2022-05-23 ASSESSMENT — FIBROSIS 4 INDEX: FIB4 SCORE: 9.13

## 2022-05-23 NOTE — PROGRESS NOTES
[facility] Medicine Daily Progress Note    Date of Service  5/23/2022    Chief Complaint  Maral Herrera is a 56 y.o. female admitted 5/14/2022 with severe epistaxis for 3 days    Hospital Course  This is a 56-year-old female with a history of a brain tumor, alcohol use, methamphetamine use, severe dilated cardiomyopathy presumed from methamphetamine use, residing at [facility], had a reported ground-level fall approximately a month ago with periorbital swelling and maxillary and sphenoid ecchymosis, the patient is on aspirin as well as Xarelto at the nursing facility and develops a significant episode of epistaxis.  The patient has a cardiomyopathy with ejection fraction of 15%.  The patient was seen in this facility at the beginning of April, cardiology was consulted, the patient at the time positive for methamphetamine.  The patient has a history of longstanding alcohol abuse.  She does have cirrhosis and reportedly has a TIPS in place.  The patient was on Xarelto what appears to be for DVT prophylaxis at the nursing facility as well as aspirin for her heart condition.  The patient had reversal of her anticoagulation with Kcentra.  I had a conversation with the patient's mother today who gave additional history.  Apparently she had been diagnosed with a brain tumor and during the surgery the patient had severe bleeding and the procedure was aborted.  A recent CT scan of the brain shows encephalomalacia but no definitive tumor residual.  Patient is found to have a UTI, she is febrile and was started on empiric antibiotics with cultures pending.    Interval Problem Update  Patient seen and examined today.  Data, Medication data reviewed.  Case discussed with nursing as available.  Plan of Care reviewed with patient and notified of changes.  5/15, the patient is complaining of achiness, pain in her legs and arms, she is unwell, currently no further nosebleed noted.  T-max 100.3, heart rate in the  90s, respiration unlabored, 4 L nasal cannula oxygen, blood pressure initially down to 70/50, currently 104 with 60, given limited amount of fluid resuscitation secondary to the patient's poor ejection fraction.  Hemoglobin had been stable, the patient with significant hyponatremia, prerenal azotemia, total bilirubin of 4.6, albumin 2.1.  No iron deficiency identified, the patient severely coagulopathic initially elevated INR of 9.32, her TEG showed a platelet inhibition also, elevated procalcitonin,  Chest x-ray without cardiopulmonary disease, cardiomegaly is noted  5/16/2022:  Continue present medical management continue with ceftriaxone patient does have positive blood culture bottles gram-negative rods in addition to urinary culture is also positive for E. coli 10-50,000 colonies.  Repeat labs in the morning.  Patient with a MELD score of approximately 27.  Prognosis extremely guarded.  5/17: Patient seen and examined, afebrile, resting in bed, blood cultures 1/2 growing gram negative rods. Urine cultures growing ESBL. I have consulted infection disease  He prognosis is extremely guarded  Consulted palliative  5/18: No overnight events, afebrile, no nausea or vomiting. Now on meropenem for the ESBL in the urin and also in blood  ID following appreciate rec.  Due to her liver failure prognosis is extremely guarded  5/19: Patient resting in bed, afebrile, no overnight events, cont on meropenem as per ID rec.   Prognosis is poor palliative team following   5/20: Patient seen and examined resting in bed, Discussed with patiet again about her severe guarded prognosis due to HF, liver failure and also now with bacteremia.  Patient understand and open to talk with hospice.  Patient  discussed with hospice today and has chosen to transition to comfort care measures   5/21: Patient resting in bed, no overnight events, patient is now on comfort measures  Awaiting placement   5/22:  Patient seen  And examined, on comfort  care measures , accepted by hospice awaiting placement   5/23: Patient resting in bed,afebrile, no overnight events, no nausea or vomiting.on comfort care measures.  Awaiting placement.    I have personally seen and examined the patient at bedside. I discussed the plan of care with patient, bedside RN, charge RN, , pharmacy and infectious disease.    Consultants/Specialty  critical care  Infection disease     Code Status  Comfort Care/DNR    Disposition  Patient is not medically cleared for discharge.   Anticipate discharge to to skilled nursing facility.  I have placed the appropriate orders for post-discharge needs.    Review of Systems  Review of Systems   Constitutional: Positive for fever and malaise/fatigue. Negative for chills.   HENT: Positive for nosebleeds.    Eyes: Negative.    Respiratory: Negative.  Negative for cough.    Cardiovascular: Negative.  Negative for chest pain and palpitations.   Gastrointestinal: Negative.  Negative for heartburn, nausea and vomiting.   Genitourinary: Positive for urgency. Negative for dysuria and frequency.   Musculoskeletal: Positive for myalgias. Negative for back pain and neck pain.   Skin: Negative.  Negative for itching and rash.   Neurological: Positive for focal weakness and headaches. Negative for dizziness and weakness.   Endo/Heme/Allergies: Negative.  Negative for polydipsia. Does not bruise/bleed easily.   Psychiatric/Behavioral: Negative for depression. The patient is nervous/anxious.         Physical Exam  Temp:  [36.1 °C (97 °F)-36.5 °C (97.7 °F)] 36.5 °C (97.7 °F)  Pulse:  [67-75] 71  Resp:  [18-20] 20  BP: (105-109)/(67-72) 105/67  SpO2:  [92 %] 92 %    Physical Exam  Vitals and nursing note reviewed.   Constitutional:       Appearance: She is well-developed and overweight. She is ill-appearing. She is not diaphoretic.      Interventions: Nasal cannula in place.   HENT:      Head: Normocephalic and atraumatic.      Comments: Ecchymosis,  bruising     Nose: Nose normal.   Eyes:      Conjunctiva/sclera: Conjunctivae normal.      Pupils: Pupils are equal, round, and reactive to light.   Neck:      Thyroid: No thyromegaly.      Vascular: No JVD.   Cardiovascular:      Rate and Rhythm: Normal rate and regular rhythm.      Heart sounds: Normal heart sounds.     No friction rub. No gallop.   Pulmonary:      Effort: Pulmonary effort is normal.      Breath sounds: Normal breath sounds. No wheezing or rales.   Abdominal:      General: Bowel sounds are normal. There is no distension.      Palpations: Abdomen is soft. There is no mass.      Tenderness: There is no abdominal tenderness. There is no guarding or rebound.   Musculoskeletal:         General: Tenderness present. Normal range of motion.      Cervical back: Normal range of motion and neck supple.   Lymphadenopathy:      Cervical: No cervical adenopathy.   Skin:     General: Skin is warm and dry.      Coloration: Skin is jaundiced.      Findings: Bruising, lesion and rash present.   Neurological:      Mental Status: She is oriented to person, place, and time. She is lethargic.      Cranial Nerves: No cranial nerve deficit.   Psychiatric:         Behavior: Behavior normal.         Fluids    Intake/Output Summary (Last 24 hours) at 5/23/2022 1232  Last data filed at 5/23/2022 1100  Gross per 24 hour   Intake 360 ml   Output --   Net 360 ml       Laboratory                        Imaging  DX-CHEST-PORTABLE (1 VIEW)   Final Result         1.  No acute cardiopulmonary disease.   2.  Cardiomegaly   3.  Atherosclerosis           Assessment/Plan  * Epistaxis- (present on admission)  Assessment & Plan  Significant supratherapeutic INR appreciated on labs and we will hold Xarelto and administer Kcentra  Stopped for the time being  Recheck coags, hold antiplatelet and anticoagulation for the time being    Bacteremia  Assessment & Plan  Blood cultures 1/2 growing gram negative rods  Urine cultures growing ESBL  ID  consulted appreciate rec.     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 coli 10-50,000 colonies in addition patient does have positive blood culture bottle for gram-negative rods.  Continue ceftriaxone 1 g continue fluid resuscitation as needed be mindful of fluid resuscitation the patient has decreased albumin likely will end up start third spacing.  Patient continues to have elevated INR no overt signs of bleeding.  Patient with MELD score of 27.    Patient now transitioned to comfort care     Supratherapeutic INR- (present on admission)  Assessment & Plan  Greater than 9 with active bleeding we will administer PCC at this time    Anemia- (present on admission)  Assessment & Plan  Transfuse hemoglobin less than 7        Advance care planning- (present on admission)  Assessment & Plan    CODE STATUS obtained at bedside and patient wishes for DO NOT RESUSCITATE DO NOT INTUBATE CODE STATUS with nurse present/intensivist present and myself and she is alert and oriented x4.    Patient now decided to transition to comfort care     Prolonged Q-T interval on ECG- (present on admission)  Assessment & Plan  Avoid QTC prolonging agents  Ativan for nausea vomiting    Thrombocytopenia (HCC)- (present on admission)  Assessment & Plan  Platelet admission secondary to aspirin use  Current bleeding has stopped, monitor  Consider platelet transfusion if rebleeding      Severe  protein-calorie malnutrition (HCC)- (present on admission)  Assessment & Plan  Dietary consult    Dilated cardiomyopathy (HCC)- (present on admission)  Assessment & Plan  Likely from longstanding methamphetamine abuse/alcoholism leading to her dilated cardiomyopathy.  Last ejection fraction 15% given 2 L in ED  Resume medication regimen once the patient tolerates    Patient transitioned to comfort care     Macrocytosis- (present on admission)  Assessment & Plan  Secondary to longstanding alcoholism      Methamphetamine abuse (HCC)- (present on admission)  Assessment & Plan  Urine drug test ordered and pending  Dilated cardiomyopathy appreciated in history and last echocardiogram ejection fraction 15%    Patient now transitioned to comfort care     Liver cirrhosis (HCC)- (present on admission)  Assessment & Plan  With elevated bilirubin, longstanding alcohol history  MELD score of 28    Patient has transitioned to comfort care today        Medically complex high risk patient    VTE prophylaxis: pharmacologic prophylaxis contraindicated due to Bleeding    I have performed a physical exam and reviewed and updated ROS and Plan today (5/23/2022). In review of yesterday's note (5/22/2022), there are no changes except as documented above.

## 2022-05-23 NOTE — DIETARY
Nutrition Services: Brief Update    RD previously following patient for PO intake. Pt has now transitioned to comfort care.     RD will monitor per dept policy is available PRN.

## 2022-05-23 NOTE — CARE PLAN
The patient is Stable - Low risk of patient condition declining or worsening    Shift Goals  Clinical Goals: Comfort, rest  Patient Goals: Rest  Family Goals: MORALES, family not present    Progress made toward(s) clinical / shift goals:  yes      Problem: Knowledge Deficit - Standard  Goal: Patient and family/care givers will demonstrate understanding of plan of care, disease process/condition, diagnostic tests and medications  Outcome: Progressing     Problem: Pain - Standard  Goal: Alleviation of pain or a reduction in pain to the patient’s comfort goal  Outcome: Progressing     Problem: Skin Integrity  Goal: Skin integrity is maintained or improved  Outcome: Progressing     Problem: Fall Risk  Goal: Patient will remain free from falls  Outcome: Progressing       Patient is not progressing towards the following goals:

## 2022-05-23 NOTE — PROGRESS NOTES
Report received from day shift RN. Patient resting in bed, VSS, able to make needs known, call-light in reach.Pain 0/10. A&O x4

## 2022-05-23 NOTE — DISCHARGE PLANNING
Case Management Discharge Planning    Admission Date: 5/14/2022  GMLOS: 3.4  ALOS: 9    6-Clicks ADL Score: 16  6-Clicks Mobility Score: 13  PT and/or OT Eval ordered: Yes  Post-acute Referrals Ordered: Yes  Post-acute Choice Obtained: Yes  Has referral(s) been sent to post-acute provider:  Yes      Anticipated Discharge Dispo: Discharge Disposition: D/T to hospice home (50)    DME Needed: No    Action(s) Taken: RN BECKY Trent and [facility] DANNY Kraft have both been unable to get a hold of pt's caregiver, Elicia. Per pt she was informed by her mother that Elicia was in the hospital and recently discharged. Multiple voicemails have been left for Elicia, however no call back to verify DC support. Pt does not qualify for the  waiver due to her age. W previously requested [facility] screen pt for Medicaid on 5/16, however there is no update. LSW sent another request to [facility] as pt may need Medicaid for LTC SNF placement with hospice.    Escalations Completed: None    Medically Clear: Yes    Next Steps: Care coordination will follow up with [facility], [facility], and continue to reach out to pt's caregiver Elicia.    Barriers to Discharge: No LTC payor source, pt does not qualify for GH waiver and is unable to afford GH, unable to get a hold of pt's caregiver to confirm DC support.    Is the patient up for discharge tomorrow: No

## 2022-05-23 NOTE — DISCHARGE PLANNING
Case Management Discharge Planning    Admission Date: 5/14/2022  GMLOS: 3.4  ALOS: 9    6-Clicks ADL Score: 16  6-Clicks Mobility Score: 13  PT and/or OT Eval ordered: Yes  Post-acute Referrals Ordered: Yes  Post-acute Choice Obtained: Yes  Has referral(s) been sent to post-acute provider:  Yes      Anticipated Discharge Dispo: Discharge Disposition: D/T to hospice home (50)    DME Needed: No    Action(s) Taken: OTHER - Chart Review    Escalations Completed: None    Medically Clear: Yes    Next Steps: F/U with pt, Elicia, [place]. [employer] in process    Barriers to Discharge: Pending Placement    Is the patient up for discharge: Yes    Is transport arranged for discharge disposition: No - pending

## 2022-05-24 ENCOUNTER — APPOINTMENT (OUTPATIENT)
Dept: RADIOLOGY | Facility: MEDICAL CENTER | Age: 57
DRG: 871 | End: 2022-05-24
Attending: STUDENT IN AN ORGANIZED HEALTH CARE EDUCATION/TRAINING PROGRAM
Payer: MEDICARE

## 2022-05-24 PROCEDURE — 700102 HCHG RX REV CODE 250 W/ 637 OVERRIDE(OP): Performed by: STUDENT IN AN ORGANIZED HEALTH CARE EDUCATION/TRAINING PROGRAM

## 2022-05-24 PROCEDURE — 770001 HCHG ROOM/CARE - MED/SURG/GYN PRIV*

## 2022-05-24 PROCEDURE — 99231 SBSQ HOSP IP/OBS SF/LOW 25: CPT | Performed by: STUDENT IN AN ORGANIZED HEALTH CARE EDUCATION/TRAINING PROGRAM

## 2022-05-24 PROCEDURE — 700111 HCHG RX REV CODE 636 W/ 250 OVERRIDE (IP): Performed by: INTERNAL MEDICINE

## 2022-05-24 PROCEDURE — A9270 NON-COVERED ITEM OR SERVICE: HCPCS | Performed by: STUDENT IN AN ORGANIZED HEALTH CARE EDUCATION/TRAINING PROGRAM

## 2022-05-24 PROCEDURE — 71045 X-RAY EXAM CHEST 1 VIEW: CPT

## 2022-05-24 RX ORDER — METOPROLOL TARTRATE 1 MG/ML
INJECTION, SOLUTION INTRAVENOUS
Status: DISCONTINUED
Start: 2022-05-24 | End: 2022-05-24

## 2022-05-24 RX ADMIN — MORPHINE SULFATE 5 MG: 10 INJECTION INTRAVENOUS at 17:25

## 2022-05-24 RX ADMIN — METOPROLOL SUCCINATE 25 MG: 25 TABLET, EXTENDED RELEASE ORAL at 05:34

## 2022-05-24 RX ADMIN — FUROSEMIDE 20 MG: 20 TABLET ORAL at 05:34

## 2022-05-24 RX ADMIN — MORPHINE SULFATE 5 MG: 10 INJECTION INTRAVENOUS at 11:55

## 2022-05-24 RX ADMIN — ATORVASTATIN CALCIUM 20 MG: 20 TABLET, FILM COATED ORAL at 17:25

## 2022-05-24 ASSESSMENT — ENCOUNTER SYMPTOMS
HEADACHES: 1
BACK PAIN: 0
NECK PAIN: 0
WEAKNESS: 0
CHILLS: 0
MYALGIAS: 1
COUGH: 0
FOCAL WEAKNESS: 1
PALPITATIONS: 0
RESPIRATORY NEGATIVE: 1
DEPRESSION: 0
CARDIOVASCULAR NEGATIVE: 1
GASTROINTESTINAL NEGATIVE: 1
NAUSEA: 0
VOMITING: 0
HEARTBURN: 0
BRUISES/BLEEDS EASILY: 0
DIZZINESS: 0
POLYDIPSIA: 0
FEVER: 0
NERVOUS/ANXIOUS: 1
EYES NEGATIVE: 1

## 2022-05-24 ASSESSMENT — FIBROSIS 4 INDEX: FIB4 SCORE: 9.13

## 2022-05-24 ASSESSMENT — PAIN DESCRIPTION - PAIN TYPE
TYPE: ACUTE PAIN

## 2022-05-24 NOTE — PROGRESS NOTES
[facility] Medicine Daily Progress Note    Date of Service  5/24/2022    Chief Complaint  Maral Herrera is a 56 y.o. female admitted 5/14/2022 with severe epistaxis for 3 days    Hospital Course  This is a 56-year-old female with a history of a brain tumor, alcohol use, methamphetamine use, severe dilated cardiomyopathy presumed from methamphetamine use, residing at [facility], had a reported ground-level fall approximately a month ago with periorbital swelling and maxillary and sphenoid ecchymosis, the patient is on aspirin as well as Xarelto at the nursing facility and develops a significant episode of epistaxis.  The patient has a cardiomyopathy with ejection fraction of 15%.  The patient was seen in this facility at the beginning of April, cardiology was consulted, the patient at the time positive for methamphetamine.  The patient has a history of longstanding alcohol abuse.  She does have cirrhosis and reportedly has a TIPS in place.  The patient was on Xarelto what appears to be for DVT prophylaxis at the nursing facility as well as aspirin for her heart condition.  The patient had reversal of her anticoagulation with Kcentra.  I had a conversation with the patient's mother today who gave additional history.  Apparently she had been diagnosed with a brain tumor and during the surgery the patient had severe bleeding and the procedure was aborted.  A recent CT scan of the brain shows encephalomalacia but no definitive tumor residual.  Patient is found to have a UTI, she is febrile and was started on empiric antibiotics with cultures pending.    Interval Problem Update  Patient seen and examined today.  Data, Medication data reviewed.  Case discussed with nursing as available.  Plan of Care reviewed with patient and notified of changes.  5/15, the patient is complaining of achiness, pain in her legs and arms, she is unwell, currently no further nosebleed noted.  T-max 100.3, heart rate in the  90s, respiration unlabored, 4 L nasal cannula oxygen, blood pressure initially down to 70/50, currently 104 with 60, given limited amount of fluid resuscitation secondary to the patient's poor ejection fraction.  Hemoglobin had been stable, the patient with significant hyponatremia, prerenal azotemia, total bilirubin of 4.6, albumin 2.1.  No iron deficiency identified, the patient severely coagulopathic initially elevated INR of 9.32, her TEG showed a platelet inhibition also, elevated procalcitonin,  Chest x-ray without cardiopulmonary disease, cardiomegaly is noted  5/16/2022:  Continue present medical management continue with ceftriaxone patient does have positive blood culture bottles gram-negative rods in addition to urinary culture is also positive for E. coli 10-50,000 colonies.  Repeat labs in the morning.  Patient with a MELD score of approximately 27.  Prognosis extremely guarded.  5/17: Patient seen and examined, afebrile, resting in bed, blood cultures 1/2 growing gram negative rods. Urine cultures growing ESBL. I have consulted infection disease  He prognosis is extremely guarded  Consulted palliative  5/18: No overnight events, afebrile, no nausea or vomiting. Now on meropenem for the ESBL in the urin and also in blood  ID following appreciate rec.  Due to her liver failure prognosis is extremely guarded  5/19: Patient resting in bed, afebrile, no overnight events, cont on meropenem as per ID rec.   Prognosis is poor palliative team following   5/20: Patient seen and examined resting in bed, Discussed with patiet again about her severe guarded prognosis due to HF, liver failure and also now with bacteremia.  Patient understand and open to talk with hospice.  Patient  discussed with hospice today and has chosen to transition to comfort care measures   5/21: Patient resting in bed, no overnight events, patient is now on comfort measures  Awaiting placement   5/22:  Patient seen  And examined, on comfort  care measures , accepted by hospice awaiting placement   5/23: Patient resting in bed,afebrile, no overnight events, no nausea or vomiting.on comfort care measures.  Awaiting placement.    5/24/2022:  Patient had agreeable quality are ongoing we will order chest x-ray for further elucidation patient had no acute events overnight offers no complaints this time denies objective fevers or chills no episodes of nosebleed.  Patient is awaiting placement to [facility] home.  Continue present medical management.  I have personally seen and examined the patient at bedside. I discussed the plan of care with patient, bedside RN, charge RN, , pharmacy and infectious disease.    Consultants/Specialty  critical care  Infection disease     Code Status  Comfort Care/DNR    Disposition  Patient is not medically cleared for discharge.   Anticipate discharge to to skilled nursing facility.  I have placed the appropriate orders for post-discharge needs.    Review of Systems  Review of Systems   Constitutional: Negative for chills, fever and malaise/fatigue.   HENT: Negative for nosebleeds.    Eyes: Negative.    Respiratory: Negative.  Negative for cough.    Cardiovascular: Negative.  Negative for chest pain and palpitations.   Gastrointestinal: Negative.  Negative for heartburn, nausea and vomiting.   Genitourinary: Positive for urgency. Negative for dysuria and frequency.   Musculoskeletal: Positive for myalgias. Negative for back pain and neck pain.   Skin: Negative.  Negative for itching and rash.   Neurological: Positive for focal weakness and headaches. Negative for dizziness and weakness.   Endo/Heme/Allergies: Negative.  Negative for polydipsia. Does not bruise/bleed easily.   Psychiatric/Behavioral: Negative for depression. The patient is nervous/anxious.         Physical Exam  Temp:  [36.3 °C (97.3 °F)-36.6 °C (97.8 °F)] 36.3 °C (97.3 °F)  Pulse:  [66-68] 67  Resp:  [17-20] 17  BP: (109-127)/() 109/75  SpO2:  [89  %-94 %] 91 %    Physical Exam  Vitals and nursing note reviewed.   Constitutional:       Appearance: She is well-developed and overweight. She is ill-appearing. She is not diaphoretic.      Interventions: Nasal cannula in place.   HENT:      Head: Normocephalic and atraumatic.      Comments: Ecchymosis, bruising     Nose: Nose normal.   Eyes:      Conjunctiva/sclera: Conjunctivae normal.      Pupils: Pupils are equal, round, and reactive to light.   Neck:      Thyroid: No thyromegaly.      Vascular: No JVD.   Cardiovascular:      Rate and Rhythm: Normal rate and regular rhythm.      Heart sounds: Normal heart sounds.     No friction rub. No gallop.   Pulmonary:      Effort: Pulmonary effort is normal.      Breath sounds: Normal breath sounds. No wheezing or rales.   Abdominal:      General: Bowel sounds are normal. There is no distension.      Palpations: Abdomen is soft. There is no mass.      Tenderness: There is no abdominal tenderness. There is no guarding or rebound.   Musculoskeletal:         General: Tenderness present. Normal range of motion.      Cervical back: Normal range of motion and neck supple.   Lymphadenopathy:      Cervical: No cervical adenopathy.   Skin:     General: Skin is warm and dry.      Coloration: Skin is jaundiced.      Findings: Bruising, lesion and rash present.   Neurological:      Mental Status: She is oriented to person, place, and time. She is lethargic.      Cranial Nerves: No cranial nerve deficit.   Psychiatric:         Behavior: Behavior normal.         Fluids    Intake/Output Summary (Last 24 hours) at 5/24/2022 1236  Last data filed at 5/24/2022 1155  Gross per 24 hour   Intake 360 ml   Output 200 ml   Net 160 ml       Laboratory                        Imaging  DX-CHEST-PORTABLE (1 VIEW)   Final Result         1.  No acute cardiopulmonary disease.   2.  Cardiomegaly   3.  Atherosclerosis      DX-CHEST-PORTABLE (1 VIEW)    (Results Pending)        Assessment/Plan  * Epistaxis-  (present on admission)  Assessment & Plan  Significant supratherapeutic INR appreciated on labs and we will hold Xarelto and administer Kcentra  Stopped for the time being  Recheck coags, hold antiplatelet and anticoagulation for the time being    Bacteremia  Assessment & Plan  Blood cultures 1/2 growing gram negative rods  Urine cultures growing ESBL  ID consulted appreciate rec.     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 coli 10-50,000 colonies in addition patient does have positive blood culture bottle for gram-negative rods.  Continue ceftriaxone 1 g continue fluid resuscitation as needed be mindful of fluid resuscitation the patient has decreased albumin likely will end up start third spacing.  Patient continues to have elevated INR no overt signs of bleeding.  Patient with MELD score of 27.    Patient now transitioned to comfort care     Supratherapeutic INR- (present on admission)  Assessment & Plan  Greater than 9 with active bleeding we will administer PCC at this time    Anemia- (present on admission)  Assessment & Plan  Transfuse hemoglobin less than 7        Advance care planning- (present on admission)  Assessment & Plan    CODE STATUS obtained at bedside and patient wishes for DO NOT RESUSCITATE DO NOT INTUBATE CODE STATUS with nurse present/intensivist present and myself and she is alert and oriented x4.    Patient now decided to transition to comfort  care     Prolonged Q-T interval on ECG- (present on admission)  Assessment & Plan  Avoid QTC prolonging agents  Ativan for nausea vomiting    Thrombocytopenia (HCC)- (present on admission)  Assessment & Plan  Platelet admission secondary to aspirin use  Current bleeding has stopped, monitor  Consider platelet transfusion if rebleeding      Severe protein-calorie malnutrition (HCC)- (present on admission)  Assessment & Plan  Dietary consult    Dilated cardiomyopathy (HCC)- (present on admission)  Assessment & Plan  Likely from longstanding methamphetamine abuse/alcoholism leading to her dilated cardiomyopathy.  Last ejection fraction 15% given 2 L in ED  Resume medication regimen once the patient tolerates    Patient transitioned to comfort care     Macrocytosis- (present on admission)  Assessment & Plan  Secondary to longstanding alcoholism      Methamphetamine abuse (HCC)- (present on admission)  Assessment & Plan  Urine drug test ordered and pending  Dilated cardiomyopathy appreciated in history and last echocardiogram ejection fraction 15%    Patient now transitioned to comfort care     Liver cirrhosis (HCC)- (present on admission)  Assessment & Plan  With elevated bilirubin, longstanding alcohol history  MELD score of 28    Patient has transitioned to comfort care today        Medically complex high risk patient    VTE prophylaxis: pharmacologic prophylaxis contraindicated due to Bleeding    I have performed a physical exam and reviewed and updated ROS and Plan today (5/24/2022). In review of yesterday's note (5/23/2022), there are no changes except as documented above.

## 2022-05-24 NOTE — DISCHARGE PLANNING
Case Management Discharge Planning    Admission Date: 5/14/2022  GMLOS: 3.4  ALOS: 10    6-Clicks ADL Score: 16  6-Clicks Mobility Score: 13  PT and/or OT Eval ordered: Yes  Post-acute Referrals Ordered: Yes  Post-acute Choice Obtained: Yes  Has referral(s) been sent to post-acute provider:  Yes  [facility]    Anticipated Discharge Dispo: Discharge Disposition: D/T to hospice home (50)    DME Needed: No    Action(s) Taken: OTHER - chart review     Escalations Completed: Provider    Medically Clear: Yes    Next Steps: f/u with Elicia Liu, Pt. [employer] resulted [employer]; requested Chest X-ray from MD    Barriers to Discharge: if pt cannot go home to prior living situation, pt will need group home placement    Is the patient up for discharge: pending

## 2022-05-24 NOTE — CARE PLAN
The patient is Stable - Low risk of patient condition declining or worsening    Shift Goals  Clinical Goals: Comfort, rest  Patient Goals: Pain control  Family Goals: N/A    Progress made toward(s) clinical / shift goals:  yes      Problem: Knowledge Deficit - Standard  Goal: Patient and family/care givers will demonstrate understanding of plan of care, disease process/condition, diagnostic tests and medications  Outcome: Progressing     Problem: Pain - Standard  Goal: Alleviation of pain or a reduction in pain to the patient’s comfort goal  Outcome: Progressing     Problem: Fall Risk  Goal: Patient will remain free from falls  Outcome: Progressing       Patient is not progressing towards the following goals:

## 2022-05-24 NOTE — PROGRESS NOTES
Palliative Care Advance Care Planning Progress Note    General: Maral Herrera is a 56-year-old female with known history of severe dilated cardiomyopathy, cirrhosis, benign brain tumor previously treated in [facility] transferred from [facility] for severe epistaxis.  Patient transition to comfort care 5/20/2022 under the care of hospitlaist Dr. Alicea.  Patient was originally seen by palliative care 5/18/2022 for advance care planning.     Discussion: Received phone call to palliative care office from hospitalist requesting clarification regarding patient's goals of care.  Met with patient.  [facility] liaison leaving room. She confirmed patient's friend Elicia is willing to care for patient but has to get a roommate to move out first.  She is requesting 1 to 1.5 weeks time to do this.  In the meantime hospice liaison will work to find placement for patient at either SNF versus group home.    Discussed patient with Dr. Linda. Current POLST is for DNR/selective treatment. Met with patient to update/revise POLST.  While doing so discussed patient's wishes for in-hospital care.  She confirms she is agreeable to minor medical care including laboratory work, imaging, and antimicrobial therapy if needed.  She confirms she would not likely want ICU admission or escalation of care if needed considering her goal is to go home on hospice and comfort measures only.  Confirmed that I would assist patient with completion of new POLST form prior to discharge or hospice team can assist once patient has discharged.  She denied having any questions/needs at this time.    Provided therapeutic communication including open-ended questions, therapeutic presence/silence, reflective listening, and validation of thoughts/feelings throughout encounter. Encouraged patient to call with any questions or needs.     Outcome: Patient okay with DNR/selective conservative medical management while she remains in the hospital.     Plan: POLST  revision prior to DC if able, otherwise [facility] team is able to follow-up.     Updated: Dr. Linda    Please call our team with questions and/or additional needs.    Total visit time was 20 minutes discussing advance care planning.    SPRING Vu.  Palliative Care Nurse Practitioner  [phone]

## 2022-05-24 NOTE — PROGRESS NOTES
Bedside report received from NOC RN. Assumed care of pt. Pt awake, laying in bed. A/Ox4, VSS. No concerns, complaints or distress. Pt educated to call before getting out of bed. POC reviewed and white board updated. Pt is medical. Call light in reach. Bed locked in lowest position with 2 upper bed rails up. Bed alarm on.

## 2022-05-25 PROCEDURE — 770001 HCHG ROOM/CARE - MED/SURG/GYN PRIV*

## 2022-05-25 PROCEDURE — 700111 HCHG RX REV CODE 636 W/ 250 OVERRIDE (IP): Performed by: INTERNAL MEDICINE

## 2022-05-25 PROCEDURE — A9270 NON-COVERED ITEM OR SERVICE: HCPCS | Performed by: STUDENT IN AN ORGANIZED HEALTH CARE EDUCATION/TRAINING PROGRAM

## 2022-05-25 PROCEDURE — 700102 HCHG RX REV CODE 250 W/ 637 OVERRIDE(OP): Performed by: STUDENT IN AN ORGANIZED HEALTH CARE EDUCATION/TRAINING PROGRAM

## 2022-05-25 PROCEDURE — 99231 SBSQ HOSP IP/OBS SF/LOW 25: CPT | Performed by: STUDENT IN AN ORGANIZED HEALTH CARE EDUCATION/TRAINING PROGRAM

## 2022-05-25 RX ADMIN — METOPROLOL SUCCINATE 25 MG: 25 TABLET, EXTENDED RELEASE ORAL at 05:20

## 2022-05-25 RX ADMIN — FUROSEMIDE 20 MG: 20 TABLET ORAL at 05:20

## 2022-05-25 RX ADMIN — ATORVASTATIN CALCIUM 20 MG: 20 TABLET, FILM COATED ORAL at 19:02

## 2022-05-25 RX ADMIN — MORPHINE SULFATE 10 MG: 10 INJECTION INTRAVENOUS at 05:25

## 2022-05-25 ASSESSMENT — ENCOUNTER SYMPTOMS
HEARTBURN: 0
EYES NEGATIVE: 1
MYALGIAS: 1
VOMITING: 0
PALPITATIONS: 0
NAUSEA: 0
BRUISES/BLEEDS EASILY: 0
FOCAL WEAKNESS: 1
CHILLS: 0
WEAKNESS: 0
NERVOUS/ANXIOUS: 1
BACK PAIN: 0
HEADACHES: 1
GASTROINTESTINAL NEGATIVE: 1
DIZZINESS: 0
COUGH: 0
NECK PAIN: 0
RESPIRATORY NEGATIVE: 1
POLYDIPSIA: 0
FEVER: 0
DEPRESSION: 0
CARDIOVASCULAR NEGATIVE: 1

## 2022-05-25 ASSESSMENT — FIBROSIS 4 INDEX: FIB4 SCORE: 9.13

## 2022-05-25 NOTE — PROGRESS NOTES
[facility] Medicine Daily Progress Note    Date of Service  5/25/2022    Chief Complaint  Maral Herrera is a 56 y.o. female admitted 5/14/2022 with severe epistaxis for 3 days    Hospital Course  This is a 56-year-old female with a history of a brain tumor, alcohol use, methamphetamine use, severe dilated cardiomyopathy presumed from methamphetamine use, residing at [facility], had a reported ground-level fall approximately a month ago with periorbital swelling and maxillary and sphenoid ecchymosis, the patient is on aspirin as well as Xarelto at the nursing facility and develops a significant episode of epistaxis.  The patient has a cardiomyopathy with ejection fraction of 15%.  The patient was seen in this facility at the beginning of April, cardiology was consulted, the patient at the time positive for methamphetamine.  The patient has a history of longstanding alcohol abuse.  She does have cirrhosis and reportedly has a TIPS in place.  The patient was on Xarelto what appears to be for DVT prophylaxis at the nursing facility as well as aspirin for her heart condition.  The patient had reversal of her anticoagulation with Kcentra.  I had a conversation with the patient's mother today who gave additional history.  Apparently she had been diagnosed with a brain tumor and during the surgery the patient had severe bleeding and the procedure was aborted.  A recent CT scan of the brain shows encephalomalacia but no definitive tumor residual.  Patient is found to have a UTI, she is febrile and was started on empiric antibiotics with cultures pending.    Interval Problem Update  Patient seen and examined today.  Data, Medication data reviewed.  Case discussed with nursing as available.  Plan of Care reviewed with patient and notified of changes.  5/15, the patient is complaining of achiness, pain in her legs and arms, she is unwell, currently no further nosebleed noted.  T-max 100.3, heart rate in the  90s, respiration unlabored, 4 L nasal cannula oxygen, blood pressure initially down to 70/50, currently 104 with 60, given limited amount of fluid resuscitation secondary to the patient's poor ejection fraction.  Hemoglobin had been stable, the patient with significant hyponatremia, prerenal azotemia, total bilirubin of 4.6, albumin 2.1.  No iron deficiency identified, the patient severely coagulopathic initially elevated INR of 9.32, her TEG showed a platelet inhibition also, elevated procalcitonin,  Chest x-ray without cardiopulmonary disease, cardiomegaly is noted  5/16/2022:  Continue present medical management continue with ceftriaxone patient does have positive blood culture bottles gram-negative rods in addition to urinary culture is also positive for E. coli 10-50,000 colonies.  Repeat labs in the morning.  Patient with a MELD score of approximately 27.  Prognosis extremely guarded.  5/17: Patient seen and examined, afebrile, resting in bed, blood cultures 1/2 growing gram negative rods. Urine cultures growing ESBL. I have consulted infection disease  He prognosis is extremely guarded  Consulted palliative  5/18: No overnight events, afebrile, no nausea or vomiting. Now on meropenem for the ESBL in the urin and also in blood  ID following appreciate rec.  Due to her liver failure prognosis is extremely guarded  5/19: Patient resting in bed, afebrile, no overnight events, cont on meropenem as per ID rec.   Prognosis is poor palliative team following   5/20: Patient seen and examined resting in bed, Discussed with patiet again about her severe guarded prognosis due to HF, liver failure and also now with bacteremia.  Patient understand and open to talk with hospice.  Patient  discussed with hospice today and has chosen to transition to comfort care measures   5/21: Patient resting in bed, no overnight events, patient is now on comfort measures  Awaiting placement   5/22:  Patient seen  And examined, on comfort  care measures , accepted by hospice awaiting placement   5/23: Patient resting in bed,afebrile, no overnight events, no nausea or vomiting.on comfort care measures.  Awaiting placement.    5/24/2022:  Patient had agreeable quality are ongoing we will order chest x-ray for further elucidation patient had no acute events overnight offers no complaints this time denies objective fevers or chills no episodes of nosebleed.  Patient is awaiting placement to group home.  Continue present medical management.    5/25/2022:  Continue to look for group home for this patient no acute events overnight.  Continue Lasix dosing patient denies subjective fevers rigors chills.    I have personally seen and examined the patient at bedside. I discussed the plan of care with patient, bedside RN, charge RN, , pharmacy and infectious disease.    Consultants/Specialty  critical care  Infection disease     Code Status  DNAR/DNI    Disposition  Patient is not medically cleared for discharge.   Anticipate discharge to to skilled nursing facility.  I have placed the appropriate orders for post-discharge needs.    Review of Systems  Review of Systems   Constitutional: Negative for chills, fever and malaise/fatigue.   HENT: Negative for nosebleeds.    Eyes: Negative.    Respiratory: Negative.  Negative for cough.    Cardiovascular: Negative.  Negative for chest pain and palpitations.   Gastrointestinal: Negative.  Negative for heartburn, nausea and vomiting.   Genitourinary: Positive for urgency. Negative for dysuria and frequency.   Musculoskeletal: Positive for myalgias. Negative for back pain and neck pain.   Skin: Negative.  Negative for itching and rash.   Neurological: Positive for focal weakness and headaches. Negative for dizziness and weakness.   Endo/Heme/Allergies: Negative.  Negative for polydipsia. Does not bruise/bleed easily.   Psychiatric/Behavioral: Negative for depression. The patient is nervous/anxious.          Physical Exam  Temp:  [35.9 °C (96.7 °F)-36.6 °C (97.9 °F)] 35.9 °C (96.7 °F)  Pulse:  [68-89] 71  Resp:  [16-18] 18  BP: ()/(62-74) 113/68  SpO2:  [92 %-100 %] 92 %    Physical Exam  Vitals and nursing note reviewed.   Constitutional:       Appearance: She is well-developed and overweight. She is ill-appearing. She is not diaphoretic.      Interventions: Nasal cannula in place.   HENT:      Head: Normocephalic and atraumatic.      Comments: Ecchymosis, bruising     Nose: Nose normal.   Eyes:      Conjunctiva/sclera: Conjunctivae normal.      Pupils: Pupils are equal, round, and reactive to light.   Neck:      Thyroid: No thyromegaly.      Vascular: No JVD.   Cardiovascular:      Rate and Rhythm: Normal rate and regular rhythm.      Heart sounds: Normal heart sounds.     No friction rub. No gallop.   Pulmonary:      Effort: Pulmonary effort is normal.      Breath sounds: Normal breath sounds. No wheezing or rales.   Abdominal:      General: Bowel sounds are normal. There is no distension.      Palpations: Abdomen is soft. There is no mass.      Tenderness: There is no abdominal tenderness. There is no guarding or rebound.   Musculoskeletal:         General: Tenderness present. Normal range of motion.      Cervical back: Normal range of motion and neck supple.   Lymphadenopathy:      Cervical: No cervical adenopathy.   Skin:     General: Skin is warm and dry.      Coloration: Skin is jaundiced.      Findings: Bruising, lesion and rash present.   Neurological:      Mental Status: She is oriented to person, place, and time. She is lethargic.      Cranial Nerves: No cranial nerve deficit.   Psychiatric:         Behavior: Behavior normal.         Fluids    Intake/Output Summary (Last 24 hours) at 5/25/2022 1622  Last data filed at 5/25/2022 0345  Gross per 24 hour   Intake --   Output 800 ml   Net -800 ml       Laboratory                        Imaging  DX-CHEST-PORTABLE (1 VIEW)   Final Result      No  radiographic evidence of acute pulmonary tuberculosis.      DX-CHEST-PORTABLE (1 VIEW)   Final Result         1.  No acute cardiopulmonary disease.   2.  Cardiomegaly   3.  Atherosclerosis           Assessment/Plan  * Epistaxis- (present on admission)  Assessment & Plan  Significant supratherapeutic INR appreciated on labs and we will hold Xarelto and administer Kcentra  Stopped for the time being  Recheck coags, hold antiplatelet and anticoagulation for the time being    Bacteremia  Assessment & Plan  Blood cultures 1/2 growing gram negative rods  Urine cultures growing ESBL  ID consulted appreciate rec.     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 coli 10-50,000 colonies in addition patient does have positive blood culture bottle for gram-negative rods.  Continue ceftriaxone 1 g continue fluid resuscitation as needed be mindful of fluid resuscitation the patient has decreased albumin likely will end up start third spacing.  Patient continues to have elevated INR no overt signs of bleeding.  Patient with MELD score of 27.    Patient now transitioned to comfort care     Supratherapeutic INR- (present on admission)  Assessment & Plan  Greater than 9 with active bleeding we will administer PCC at this time    Anemia- (present on admission)  Assessment & Plan  Transfuse hemoglobin less than 7        Advance care planning- (present on admission)  Assessment & Plan    CODE  STATUS obtained at bedside and patient wishes for DO NOT RESUSCITATE DO NOT INTUBATE CODE STATUS with nurse present/intensivist present and myself and she is alert and oriented x4.    Patient now decided to transition to comfort care     Prolonged Q-T interval on ECG- (present on admission)  Assessment & Plan  Avoid QTC prolonging agents  Ativan for nausea vomiting    Thrombocytopenia (HCC)- (present on admission)  Assessment & Plan  Platelet admission secondary to aspirin use  Current bleeding has stopped, monitor  Consider platelet transfusion if rebleeding      Severe protein-calorie malnutrition (HCC)- (present on admission)  Assessment & Plan  Dietary consult    Dilated cardiomyopathy (HCC)- (present on admission)  Assessment & Plan  Likely from longstanding methamphetamine abuse/alcoholism leading to her dilated cardiomyopathy.  Last ejection fraction 15% given 2 L in ED  Resume medication regimen once the patient tolerates    Patient transitioned to comfort care     Macrocytosis- (present on admission)  Assessment & Plan  Secondary to longstanding alcoholism      Methamphetamine abuse (HCC)- (present on admission)  Assessment & Plan  Urine drug test ordered and pending  Dilated cardiomyopathy appreciated in history and last echocardiogram ejection fraction 15%    Patient now transitioned to comfort care     Liver cirrhosis (HCC)- (present on admission)  Assessment & Plan  With elevated bilirubin, longstanding alcohol history  MELD score of 28    Patient has transitioned to comfort care today        Medically complex high risk patient    VTE prophylaxis: pharmacologic prophylaxis contraindicated due to Bleeding    I have performed a physical exam and reviewed and updated ROS and Plan today (5/25/2022). In review of yesterday's note (5/24/2022), there are no changes except as documented above.

## 2022-05-25 NOTE — DISCHARGE PLANNING
RNCM PC to [facility] to screen pt for Medicaid. Spoke to Cassie regarding pt does not have her cell phone bedside so she will not receive the VM left by Whit Banks on 5/24/22/. Cassie stated she will request screener to come bedside.

## 2022-05-25 NOTE — CARE PLAN
The patient is Stable - Low risk of patient condition declining or worsening    Shift Goals  Clinical Goals: Rest, pain control  Patient Goals: Rest  Family Goals: N/A    Progress made toward(s) clinical / shift goals:  yes      Problem: Knowledge Deficit - Standard  Goal: Patient and family/care givers will demonstrate understanding of plan of care, disease process/condition, diagnostic tests and medications  Outcome: Progressing     Problem: Pain - Standard  Goal: Alleviation of pain or a reduction in pain to the patient’s comfort goal  Outcome: Progressing     Problem: Psychosocial  Goal: Patient's level of anxiety will decrease  Outcome: Progressing     Problem: Communication  Goal: The ability to communicate needs accurately and effectively will improve  Outcome: Progressing     Problem: Fluid Volume  Goal: Fluid volume balance will be maintained  Outcome: Progressing       Patient is not progressing towards the following goals:

## 2022-05-25 NOTE — CARE PLAN
The patient is Stable - Low risk of patient condition declining or worsening    Shift Goals  Clinical Goals: pain control, comfort,  rest  Patient Goals: pain control, cmfort, rest  Family Goals: N/A    Progress made toward(s) clinical / shift goals:  yes    Patient is not progressing towards the following goals:n/a    Problem: Pain - Standard  Goal: Alleviation of pain or a reduction in pain to the patient’s comfort goal  Outcome: Progressing     Problem: Psychosocial  Goal: Patient's level of anxiety will decrease  Outcome: Progressing

## 2022-05-26 PROCEDURE — A9270 NON-COVERED ITEM OR SERVICE: HCPCS | Performed by: STUDENT IN AN ORGANIZED HEALTH CARE EDUCATION/TRAINING PROGRAM

## 2022-05-26 PROCEDURE — A9270 NON-COVERED ITEM OR SERVICE: HCPCS | Performed by: INTERNAL MEDICINE

## 2022-05-26 PROCEDURE — 99231 SBSQ HOSP IP/OBS SF/LOW 25: CPT | Performed by: STUDENT IN AN ORGANIZED HEALTH CARE EDUCATION/TRAINING PROGRAM

## 2022-05-26 PROCEDURE — 700102 HCHG RX REV CODE 250 W/ 637 OVERRIDE(OP): Performed by: STUDENT IN AN ORGANIZED HEALTH CARE EDUCATION/TRAINING PROGRAM

## 2022-05-26 PROCEDURE — 770001 HCHG ROOM/CARE - MED/SURG/GYN PRIV*

## 2022-05-26 PROCEDURE — 700102 HCHG RX REV CODE 250 W/ 637 OVERRIDE(OP): Performed by: INTERNAL MEDICINE

## 2022-05-26 RX ORDER — HYDROMORPHONE HYDROCHLORIDE 1 MG/ML
0.25 INJECTION, SOLUTION INTRAMUSCULAR; INTRAVENOUS; SUBCUTANEOUS
Status: DISCONTINUED | OUTPATIENT
Start: 2022-05-26 | End: 2022-06-02

## 2022-05-26 RX ORDER — OXYCODONE HYDROCHLORIDE 5 MG/1
5 TABLET ORAL
Status: DISCONTINUED | OUTPATIENT
Start: 2022-05-26 | End: 2022-06-02

## 2022-05-26 RX ORDER — OXYCODONE HYDROCHLORIDE 5 MG/1
2.5 TABLET ORAL
Status: DISCONTINUED | OUTPATIENT
Start: 2022-05-26 | End: 2022-06-02

## 2022-05-26 RX ADMIN — OXYCODONE 2.5 MG: 5 TABLET ORAL at 16:45

## 2022-05-26 RX ADMIN — SCOPALAMINE 1 PATCH: 1 PATCH, EXTENDED RELEASE TRANSDERMAL at 11:58

## 2022-05-26 RX ADMIN — OXYCODONE 5 MG: 5 TABLET ORAL at 23:18

## 2022-05-26 RX ADMIN — FUROSEMIDE 20 MG: 20 TABLET ORAL at 05:10

## 2022-05-26 RX ADMIN — OXYCODONE 5 MG: 5 TABLET ORAL at 09:57

## 2022-05-26 RX ADMIN — ATORVASTATIN CALCIUM 20 MG: 20 TABLET, FILM COATED ORAL at 16:45

## 2022-05-26 ASSESSMENT — ENCOUNTER SYMPTOMS
BRUISES/BLEEDS EASILY: 0
DEPRESSION: 0
HEADACHES: 1
GASTROINTESTINAL NEGATIVE: 1
DIZZINESS: 0
RESPIRATORY NEGATIVE: 1
WEAKNESS: 0
PALPITATIONS: 0
NECK PAIN: 0
COUGH: 0
MYALGIAS: 1
CARDIOVASCULAR NEGATIVE: 1
EYES NEGATIVE: 1
FOCAL WEAKNESS: 1
VOMITING: 0
FEVER: 0
NAUSEA: 0
NERVOUS/ANXIOUS: 1
POLYDIPSIA: 0
CHILLS: 0
BACK PAIN: 0
HEARTBURN: 0

## 2022-05-26 ASSESSMENT — PAIN DESCRIPTION - PAIN TYPE
TYPE: ACUTE PAIN;CHRONIC PAIN
TYPE: ACUTE PAIN
TYPE: ACUTE PAIN;CHRONIC PAIN

## 2022-05-26 NOTE — CARE PLAN
The patient is Stable - Low risk of patient condition declining or worsening    Shift Goals  Clinical Goals: Rest, comfort  Patient Goals: Comfort  Family Goals: N/A    Progress made toward(s) clinical / shift goals:  yes      Problem: Knowledge Deficit - Standard  Goal: Patient and family/care givers will demonstrate understanding of plan of care, disease process/condition, diagnostic tests and medications  Outcome: Progressing     Problem: Pain - Standard  Goal: Alleviation of pain or a reduction in pain to the patient’s comfort goal  Outcome: Progressing     Problem: Fall Risk  Goal: Patient will remain free from falls  Outcome: Progressing     Problem: Psychosocial  Goal: Patient's level of anxiety will decrease  Outcome: Progressing       Patient is not progressing towards the following goals:

## 2022-05-26 NOTE — DISCHARGE PLANNING
"Case Management Discharge Planning    Admission Date: 5/14/2022  GMLOS: 3.4  ALOS: 12    6-Clicks ADL Score: 16  6-Clicks Mobility Score: 13  PT and/or OT Eval ordered: Yes  Post-acute Referrals Ordered: Yes  Post-acute Choice Obtained: Yes  Has referral(s) been sent to post-acute provider:  Yes      Anticipated Discharge Dispo: Discharge Disposition: D/T to hospice home (50)    DME Needed: No    Action(s) Taken: OTHER    Escalations Completed: Leadership    Medically Clear: Yes    Next Steps: Late afternoon on 5/25/22 Bill from [facility] evaluated pt bedside. Bill updated RNCM that he will accept pt with terms \"1 month up to 6months unless placement found sooner or pt passes.\" RNCM requested HOSSEIN from leadership.    Barriers to Discharge: Pending Placement        1415: RNCM checked in with Leadership on status of HOSSEIN, awaiting reply.  "

## 2022-05-26 NOTE — PROGRESS NOTES
[facility] Medicine Daily Progress Note    Date of Service  5/26/2022    Chief Complaint  Maral Herrera is a 56 y.o. female admitted 5/14/2022 with severe epistaxis for 3 days    Hospital Course  This is a 56-year-old female with a history of a brain tumor, alcohol use, methamphetamine use, severe dilated cardiomyopathy presumed from methamphetamine use, residing at [facility], had a reported ground-level fall approximately a month ago with periorbital swelling and maxillary and sphenoid ecchymosis, the patient is on aspirin as well as Xarelto at the nursing facility and develops a significant episode of epistaxis.  The patient has a cardiomyopathy with ejection fraction of 15%.  The patient was seen in this facility at the beginning of April, cardiology was consulted, the patient at the time positive for methamphetamine.  The patient has a history of longstanding alcohol abuse.  She does have cirrhosis and reportedly has a TIPS in place.  The patient was on Xarelto what appears to be for DVT prophylaxis at the nursing facility as well as aspirin for her heart condition.  The patient had reversal of her anticoagulation with Kcentra.  I had a conversation with the patient's mother today who gave additional history.  Apparently she had been diagnosed with a brain tumor and during the surgery the patient had severe bleeding and the procedure was aborted.  A recent CT scan of the brain shows encephalomalacia but no definitive tumor residual.  Patient is found to have a UTI, she is febrile and was started on empiric antibiotics with cultures pending.    Interval Problem Update  Patient seen and examined today.  Data, Medication data reviewed.  Case discussed with nursing as available.  Plan of Care reviewed with patient and notified of changes.  5/15, the patient is complaining of achiness, pain in her legs and arms, she is unwell, currently no further nosebleed noted.  T-max 100.3, heart rate in the  90s, respiration unlabored, 4 L nasal cannula oxygen, blood pressure initially down to 70/50, currently 104 with 60, given limited amount of fluid resuscitation secondary to the patient's poor ejection fraction.  Hemoglobin had been stable, the patient with significant hyponatremia, prerenal azotemia, total bilirubin of 4.6, albumin 2.1.  No iron deficiency identified, the patient severely coagulopathic initially elevated INR of 9.32, her TEG showed a platelet inhibition also, elevated procalcitonin,  Chest x-ray without cardiopulmonary disease, cardiomegaly is noted  5/16/2022:  Continue present medical management continue with ceftriaxone patient does have positive blood culture bottles gram-negative rods in addition to urinary culture is also positive for E. coli 10-50,000 colonies.  Repeat labs in the morning.  Patient with a MELD score of approximately 27.  Prognosis extremely guarded.  5/17: Patient seen and examined, afebrile, resting in bed, blood cultures 1/2 growing gram negative rods. Urine cultures growing ESBL. I have consulted infection disease  He prognosis is extremely guarded  Consulted palliative  5/18: No overnight events, afebrile, no nausea or vomiting. Now on meropenem for the ESBL in the urin and also in blood  ID following appreciate rec.  Due to her liver failure prognosis is extremely guarded  5/19: Patient resting in bed, afebrile, no overnight events, cont on meropenem as per ID rec.   Prognosis is poor palliative team following   5/20: Patient seen and examined resting in bed, Discussed with patiet again about her severe guarded prognosis due to HF, liver failure and also now with bacteremia.  Patient understand and open to talk with hospice.  Patient  discussed with hospice today and has chosen to transition to comfort care measures   5/21: Patient resting in bed, no overnight events, patient is now on comfort measures  Awaiting placement   5/22:  Patient seen  And examined, on comfort  care measures , accepted by hospice awaiting placement   5/23: Patient resting in bed,afebrile, no overnight events, no nausea or vomiting.on comfort care measures.  Awaiting placement.    5/24/2022:  Patient had agreeable quality are ongoing we will order chest x-ray for further elucidation patient had no acute events overnight offers no complaints this time denies objective fevers or chills no episodes of nosebleed.  Patient is awaiting placement to group home.  Continue present medical management.    5/25/2022:  Continue to look for group home for this patient no acute events overnight.  Continue Lasix dosing patient denies subjective fevers rigors chills.    5/26/2022  Hospice consult was placed hospice graciously ordered at his group home pending final approval from administration otherwise no acute events overnight patient denies subjective fevers rigors chills continue present medical management.    I have personally seen and examined the patient at bedside. I discussed the plan of care with patient, bedside RN, charge RN, , pharmacy and infectious disease.    Consultants/Specialty  critical care  Infection disease     Code Status  DNAR/DNI    Disposition  Patient is not medically cleared for discharge.   Anticipate discharge to to skilled nursing facility.  I have placed the appropriate orders for post-discharge needs.    Review of Systems  Review of Systems   Constitutional: Negative for chills, fever and malaise/fatigue.   HENT: Negative for nosebleeds.    Eyes: Negative.    Respiratory: Negative.  Negative for cough.    Cardiovascular: Negative.  Negative for chest pain and palpitations.   Gastrointestinal: Negative.  Negative for heartburn, nausea and vomiting.   Genitourinary: Positive for urgency. Negative for dysuria and frequency.   Musculoskeletal: Positive for myalgias. Negative for back pain and neck pain.   Skin: Negative.  Negative for itching and rash.   Neurological: Positive for  focal weakness and headaches. Negative for dizziness and weakness.   Endo/Heme/Allergies: Negative.  Negative for polydipsia. Does not bruise/bleed easily.   Psychiatric/Behavioral: Negative for depression. The patient is nervous/anxious.         Physical Exam  Temp:  [35.9 °C (96.7 °F)-36.6 °C (97.9 °F)] 36.6 °C (97.9 °F)  Pulse:  [58-75] 58  Resp:  [16-18] 16  BP: ()/(61-68) 102/62  SpO2:  [91 %-94 %] 92 %    Physical Exam  Vitals and nursing note reviewed.   Constitutional:       Appearance: She is well-developed and overweight. She is ill-appearing. She is not diaphoretic.      Interventions: Nasal cannula in place.   HENT:      Head: Normocephalic and atraumatic.      Comments: Ecchymosis, bruising     Nose: Nose normal.   Eyes:      Conjunctiva/sclera: Conjunctivae normal.      Pupils: Pupils are equal, round, and reactive to light.   Neck:      Thyroid: No thyromegaly.      Vascular: No JVD.   Cardiovascular:      Rate and Rhythm: Normal rate and regular rhythm.      Heart sounds: Normal heart sounds.     No friction rub. No gallop.   Pulmonary:      Effort: Pulmonary effort is normal.      Breath sounds: Normal breath sounds. No wheezing or rales.   Abdominal:      General: Bowel sounds are normal. There is no distension.      Palpations: Abdomen is soft. There is no mass.      Tenderness: There is no abdominal tenderness. There is no guarding or rebound.   Musculoskeletal:         General: Tenderness present. Normal range of motion.      Cervical back: Normal range of motion and neck supple.   Lymphadenopathy:      Cervical: No cervical adenopathy.   Skin:     General: Skin is warm and dry.      Coloration: Skin is jaundiced.      Findings: Bruising, lesion and rash present.   Neurological:      Mental Status: She is oriented to person, place, and time. She is lethargic.      Cranial Nerves: No cranial nerve deficit.   Psychiatric:         Behavior: Behavior normal.         Fluids    Intake/Output  Summary (Last 24 hours) at 5/26/2022 1437  Last data filed at 5/26/2022 1200  Gross per 24 hour   Intake --   Output 1750 ml   Net -1750 ml       Laboratory                        Imaging  DX-CHEST-PORTABLE (1 VIEW)   Final Result      No radiographic evidence of acute pulmonary tuberculosis.      DX-CHEST-PORTABLE (1 VIEW)   Final Result         1.  No acute cardiopulmonary disease.   2.  Cardiomegaly   3.  Atherosclerosis           Assessment/Plan  * Epistaxis- (present on admission)  Assessment & Plan  Significant supratherapeutic INR appreciated on labs and we will hold Xarelto and administer Kcentra  Stopped for the time being  Recheck coags, hold antiplatelet and anticoagulation for the time being    Bacteremia  Assessment & Plan  Blood cultures 1/2 growing gram negative rods  Urine cultures growing ESBL  ID consulted appreciate rec.     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 coli 10-50,000 colonies in addition patient does have positive blood culture bottle for gram-negative rods.  Continue ceftriaxone 1 g continue fluid resuscitation as needed be mindful of fluid resuscitation the patient has decreased albumin likely will end up start third spacing.  Patient continues to have elevated INR no overt signs of bleeding.  Patient with MELD score of 27.    Patient now transitioned to comfort care     Supratherapeutic INR- (present on admission)  Assessment &  Plan  Greater than 9 with active bleeding we will administer PCC at this time    Anemia- (present on admission)  Assessment & Plan  Transfuse hemoglobin less than 7        Advance care planning- (present on admission)  Assessment & Plan    CODE STATUS obtained at bedside and patient wishes for DO NOT RESUSCITATE DO NOT INTUBATE CODE STATUS with nurse present/intensivist present and myself and she is alert and oriented x4.    Patient now decided to transition to comfort care     Prolonged Q-T interval on ECG- (present on admission)  Assessment & Plan  Avoid QTC prolonging agents  Ativan for nausea vomiting    Thrombocytopenia (HCC)- (present on admission)  Assessment & Plan  Platelet admission secondary to aspirin use  Current bleeding has stopped, monitor  Consider platelet transfusion if rebleeding      Severe protein-calorie malnutrition (HCC)- (present on admission)  Assessment & Plan  Dietary consult    Dilated cardiomyopathy (HCC)- (present on admission)  Assessment & Plan  Likely from longstanding methamphetamine abuse/alcoholism leading to her dilated cardiomyopathy.  Last ejection fraction 15% given 2 L in ED  Resume medication regimen once the patient tolerates    Patient transitioned to comfort care     Macrocytosis- (present on admission)  Assessment & Plan  Secondary to longstanding alcoholism      Methamphetamine abuse (HCC)- (present on admission)  Assessment & Plan  Urine drug test ordered and pending  Dilated cardiomyopathy appreciated in history and last echocardiogram ejection fraction 15%    Patient now transitioned to comfort care     Liver cirrhosis (HCC)- (present on admission)  Assessment & Plan  With elevated bilirubin, longstanding alcohol history  MELD score of 28    Patient has transitioned to comfort care today        Medically complex high risk patient    VTE prophylaxis: pharmacologic prophylaxis contraindicated due to Bleeding    I have performed a physical exam and reviewed and  updated ROS and Plan today (5/26/2022). In review of yesterday's note (5/25/2022), there are no changes except as documented above.

## 2022-05-26 NOTE — PROGRESS NOTES
Assumed care of patient. Pt is A+O x4. No chest pain or SOB. No active bleeding noted. Informed of safety and call system and bed alarm on.

## 2022-05-27 PROCEDURE — 770004 HCHG ROOM/CARE - ONCOLOGY PRIVATE *

## 2022-05-27 PROCEDURE — A9270 NON-COVERED ITEM OR SERVICE: HCPCS | Performed by: STUDENT IN AN ORGANIZED HEALTH CARE EDUCATION/TRAINING PROGRAM

## 2022-05-27 PROCEDURE — 700102 HCHG RX REV CODE 250 W/ 637 OVERRIDE(OP): Performed by: STUDENT IN AN ORGANIZED HEALTH CARE EDUCATION/TRAINING PROGRAM

## 2022-05-27 PROCEDURE — 99231 SBSQ HOSP IP/OBS SF/LOW 25: CPT | Performed by: STUDENT IN AN ORGANIZED HEALTH CARE EDUCATION/TRAINING PROGRAM

## 2022-05-27 RX ADMIN — METOPROLOL SUCCINATE 25 MG: 25 TABLET, EXTENDED RELEASE ORAL at 05:49

## 2022-05-27 RX ADMIN — FUROSEMIDE 20 MG: 20 TABLET ORAL at 05:49

## 2022-05-27 RX ADMIN — OXYCODONE 5 MG: 5 TABLET ORAL at 05:49

## 2022-05-27 RX ADMIN — ATORVASTATIN CALCIUM 20 MG: 20 TABLET, FILM COATED ORAL at 18:33

## 2022-05-27 RX ADMIN — OXYCODONE 5 MG: 5 TABLET ORAL at 22:03

## 2022-05-27 RX ADMIN — OXYCODONE 5 MG: 5 TABLET ORAL at 18:36

## 2022-05-27 ASSESSMENT — ENCOUNTER SYMPTOMS
POLYDIPSIA: 0
RESPIRATORY NEGATIVE: 1
CHILLS: 0
BRUISES/BLEEDS EASILY: 0
CARDIOVASCULAR NEGATIVE: 1
WEAKNESS: 0
DEPRESSION: 0
DIZZINESS: 0
MYALGIAS: 1
VOMITING: 0
HEARTBURN: 0
HEADACHES: 1
NAUSEA: 0
COUGH: 0
BACK PAIN: 0
GASTROINTESTINAL NEGATIVE: 1
NERVOUS/ANXIOUS: 1
NECK PAIN: 0
FOCAL WEAKNESS: 1
EYES NEGATIVE: 1
PALPITATIONS: 0
FEVER: 0

## 2022-05-27 ASSESSMENT — PAIN DESCRIPTION - PAIN TYPE
TYPE: ACUTE PAIN;CHRONIC PAIN

## 2022-05-27 ASSESSMENT — FIBROSIS 4 INDEX: FIB4 SCORE: 9.13

## 2022-05-27 NOTE — DISCHARGE PLANNING
Case Management Discharge Planning    Admission Date: 5/14/2022  GMLOS: 3.4  ALOS: 13    6-Clicks ADL Score: 16  6-Clicks Mobility Score: 13    Anticipated Discharge Dispo: Discharge Disposition: D/T to hospice home (50)    Action(s) Taken: OTHER    Escalations Completed: Provider and Leadership    Medically Clear: Yes    Next Steps: RNCM email [facility] group home the contract form CM Leadership at 0830 on 5/27/22. RNCM placed f/u call to  owner, Charanjit [phone]. Charanjit stated he wants the terms to be longer than the 3 months stated in the contract. RNCM requested Charanjit to send over the  assessment paperwork so I can get MD to fill-out and sign same day so I can return to Charanjit prior to holiday weekend. Charanjit stated he will fax the  assessment to [phone] while BECKY requests leadership for an extended terms. RNCM will also call ARANZA [phone] to inquire about projected room availability at her house for the patient.     Barriers to Discharge: Pending Placement      RNCM placed call to Aranza; no answer and mailbox full.  RNCM requested from MD current MELD score. (Per MD, MELD score is 40).    RNCM gave report to new floor's RNCM. This RNCM to call Charanjit and Alexa to update pt's room change.    1445: RNCM placed call to Charanjit; Charanjit requesting [employer] contract to read upto 6 months or until pt passes. Leadership notified of request. - Per [facility] Director: after 3 months Charanjit can request extension of time.    1515: RNCM printed [place] form. Abena Liu stated she is coming to talk to RNCM in person.

## 2022-05-27 NOTE — CARE PLAN
The patient is Stable - Low risk of patient condition declining or worsening    Shift Goals  Clinical Goals: Safety, Pain management, Monitor VS  Patient Goals: Comfort  Family Goals: MORALES    Progress made toward(s) clinical / shift goals:    Problem: Knowledge Deficit - Standard  Goal: Patient and family/care givers will demonstrate understanding of plan of care, disease process/condition, diagnostic tests and medications  Outcome: Progressing     Problem: Pain - Standard  Goal: Alleviation of pain or a reduction in pain to the patient’s comfort goal  Outcome: Progressing     Problem: Skin Integrity  Goal: Skin integrity is maintained or improved  Outcome: Progressing     Problem: Fall Risk  Goal: Patient will remain free from falls  Outcome: Progressing     Problem: Psychosocial  Goal: Patient's ability to re-evaluate and adapt role responsibilities will improve  Outcome: Progressing     Problem: Hemodynamics  Goal: Patient's hemodynamics, fluid balance and neurologic status will be stable or improve  Outcome: Progressing     Problem: Self Care  Goal: Patient will have the ability to perform ADLs independently or with assistance (bathe, groom, dress, toilet and feed)  Outcome: Progressing

## 2022-05-27 NOTE — PROGRESS NOTES
Bedside report received and patient care assumed. Pt is resting in bed, A&Ox4, with no complaints of pain, and is on RA. All fall precautions are in place, belongings at bedside table.  Pt was updated on POC, no questions or concerns. Pt educated on use of call light for assistance.

## 2022-05-27 NOTE — DISCHARGE INSTRUCTIONS
"  Discharge Instructions per Dr. Fredy Woodward D.O.    DIET: Supplements  Diet Order Diet: Cardiac (\"re-firing\" per Graciela, not showing on her end.); Second Modifier: (optional): 2 Gram Sodium; Fluid modifications: (optional): 1500 ml Fluid Restriction    ACTIVITY: As tolerated    A proper diet that is low in grease, fat, and salt, along with 30 minutes of exercise per day will lead to weight loss, and better controlled blood sugar and blood pressure.    DIAGNOSIS: Epistaxis    Follow up with your Primary Care Provider Pcp as scheduled or sooner if your symptoms persist or worsen.  Return to Emergency Room for sever chest pain, shortness of breath, signs of a stroke, or any other emergencies.      Discharge Instructions    Discharged to [facility] home by medical transportation with escort. Discharged via wheelchair, hospital escort: Refused.  Special equipment needed: Not Applicable    Be sure to schedule a follow-up appointment with your primary care doctor or any specialists as instructed.     Discharge Plan:        I understand that a diet low in cholesterol, fat, and sodium is recommended for good health. Unless I have been given specific instructions below for another diet, I accept this instruction as my diet prescription.       Special Instructions:   [facility] Patient Discharge Instructions  Monitor your weight daily, and maintain a weight chart, to track your weight changes.   Activity as tolerated, unless your Doctor has ordered otherwise.   Follow a low fat, low cholesterol, low salt diet unless instructed otherwise by your Doctor. Read the labels on the back of food products and track your intake of fat, cholesterol and salt.   Fluid Restriction No. If a Fluid Restriction has been ordered by your Doctor, measure fluids with a measuring cup to ensure that you are not exceeding the restriction.   No smoking.  Oxygen No. If your Doctor has ordered that you wear Oxygen at home, it is important to wear it as " ordered.  Did you receive an explanation from staff on the importance of taking each of your medications and why it is necessary to keep taking them unless your doctor says to stop? Yes  Were all of your questions answered about how to manage your heart failure and what to do if you have increased signs and symptoms after you go home? Yes  Do you feel like your heart failure care team involved you in the care treatment plan and allowed you to make decisions regarding your care while in the hospital and addressed any discharge needs you might have? Yes    See the educational handout provided at discharge for more information on monitoring your daily weight, activity and diet. This also explains more about Heart Failure, symptoms of a flare-up and some of the tests that you have undergone.     Warning Signs of a Flare-Up include:  Swelling in the ankles or lower legs.  Shortness of breath, while at rest, or while doing normal activities.   Shortness of breath at night when in bed, or coughing in bed.   Requiring more pillows to sleep at night, or needing to sit up at night to sleep.  Feeling weak, dizzy or fatigued.     When to call your Doctor:  [facility] seven days a week from 8:00 a.m. to 8:00 p.m. for medical questions [phone].  Call your Primary Care Physician or Cardiologist if:   You experience any pain radiating to your jaw or neck.  You have any difficulty breathing.  You experience weight gain of 3 lbs in a day or 5 lbs in a week.   You feel any palpitations or irregular heartbeats.  You become dizzy or lose consciousness.   If you have had an angiogram or had a pacemaker or AICD placed, and experience:  Bleeding, drainage or swelling at the surgical / puncture site.  Fever greater than 100.0 F  Shock from internal defibrillator.  Cool and / or numb extremities.    Is patient discharged on Warfarin / Coumadin?   No     Depression / Suicide Risk    As you are discharged from this  [facility] facility, it is important to learn how to keep safe from harming yourself.    Recognize the warning signs:  Abrupt changes in personality, positive or negative- including increase in energy   Giving away possessions  Change in eating patterns- significant weight changes-  positive or negative  Change in sleeping patterns- unable to sleep or sleeping all the time   Unwillingness or inability to communicate  Depression  Unusual sadness, discouragement and loneliness  Talk of wanting to die  Neglect of personal appearance   Rebelliousness- reckless behavior  Withdrawal from people/activities they love  Confusion- inability to concentrate     If you or a loved one observes any of these behaviors or has concerns about self-harm, here's what you can do:  Talk about it- your feelings and reasons for harming yourself  Remove any means that you might use to hurt yourself (examples: pills, rope, extension cords, firearm)  Get professional help from the community (Mental Health, Substance Abuse, psychological counseling)  Do not be alone:Call your Safe Contact- someone whom you trust who will be there for you.  Call your local CRISIS HOTLINE 749-5225 or 658-322-4251  Call your local Children's Mobile Crisis Response Team [place] (213) 079-3423 or www.Ideal [employer]  Call the toll free National Suicide Prevention Hotlines   National Suicide Prevention Lifeline 257-172-YZLL (5523)  National Hope Line Network 800-SUICIDE (469-9780)    Hospice  Hospice is a service that is designed to provide people who are terminally ill and their families with medical, spiritual, and psychological support. Its aim is to improve your quality of life by keeping you as comfortable as possible in the final stages of life.  Who will be my providers when I begin hospice care?  Hospice teams often include:  A nurse.  A doctor. The hospice doctor will be available for your care, but you can include your regular doctor or nurse  practitioner.  A .  A counselor.  A  (such as a ).  A dietitian.  Therapists.  Trained volunteers who can help with care.  What services does hospice provide?  Hospice services can vary depending on the center or organization. Generally, they include:  Ways to keep you comfortable, such as:  Providing care in your home or in a home-like setting.  Working with your family and friends to help meet your needs.  Allowing you to enjoy the support of loved ones by receiving much of your basic care from family and friends.  Pain relief and symptom management. The staff will supply all necessary medicines and equipment so that you can stay comfortable and alert enough to enjoy the company of your friends and family.  Visits or care from a nurse and doctor. This may include 24-hour on-call services.  Companionship when you are alone.  Allowing you and your family to rest. Hospice staff may do light housekeeping, prepare meals, and run errands.  Counseling. They will make sure your emotional, spiritual, and social needs are being met, as well as those needs of your family members.  Spiritual care. This will be individualized to meet your needs and your family's needs. It may involve:  Helping you and your family understand the dying process.  Helping you say goodbye to your family and friends.  Performing a specific Zoroastrianism ceremony or ritual.  Massage.  Nutrition therapy.  Physical and occupational therapy.  Short-term inpatient care, if something cannot be managed in the home.  Art or music therapy.  Bereavement support for grieving family members.  When should hospice care begin?  Most people who use hospice are believed to have less than 6 months to live.  Your family and health care providers can help you decide when hospice services should begin.  If you live longer than 6 months but your condition does not improve, your doctor may be able to approve you for continued hospice  care.  If your condition improves, you may discontinue the program.  What should I consider before selecting a program?  Most hospice programs are run by nonprofit, independent organizations. Some are affiliated with hospitals, nursing homes, or home health care agencies. Hospice programs can take place in your home or at a hospice center, hospital, or skilled nursing facility. When choosing a hospice program, ask the following questions:  What services are available to me?  What services will be offered to my loved ones?  How involved will my loved ones be?  How involved will my health care provider be?  Who makes up the hospice care team? How are they trained or screened?  How will my pain and symptoms be managed?  If my circumstances change, can the services be provided in a different setting, such as my home or in the hospital?  Is the program reviewed and licensed by the state or certified in some other way?  What does it cost? Is it covered by insurance?  If I choose a hospice center or nursing home, where is the hospice center located? Is it convenient for family and friends?  If I choose a hospice center or nursing home, can my family and friends visit any time?  Will you provide emotional and spiritual support?  Who can my family call with questions?  Where can I learn more about hospice?  You can learn about existing hospice programs in your area from your health care providers. You can also read more about hospice online. The websites of the following organizations have helpful information:  National Hospice and Palliative Care Organization (NHPCO): www.nhpco.org  National Association for Home Care & Hospice (NAHC): www.nahc.org  Hospice Foundation of [place] (HFA): www.hospicefoundation.org  American Cancer Society (ACS): www.cancer.org  Hospice Net: www.hospicenet.org  Visiting Nurse Associations of [place] (VNAA): www.vnaa.org  You may also find more information by contacting the following agencies:  A  local agency on aging.  Your local [facility] chapter.  Your state's department of health or .  Summary  Hospice is a service that is designed to provide people who are terminally ill and their families with medical, spiritual, and psychological support.  Hospice aims to improve your quality of life by keeping you as comfortable as possible in the final stages of life.  Hospice teams often include a doctor, nurse, , counselor, ,dietitian, therapists, and volunteers.  Hospice care generally includes medicine for symptom management, visits from doctors and nurses, physical and occupational therapy, nutrition counseling, spiritual and emotional counseling, caregiver support, and bereavement support for grieving family members.  Hospice programs can take place in your home or at a hospice center, hospital, or skilled nursing facility.  This information is not intended to replace advice given to you by your health care provider. Make sure you discuss any questions you have with your health care provider.  Document Released: 04/05/2005 Document Revised: 11/30/2018 Document Reviewed: 01/09/2018  Elsevier Patient Education © 2020 Elsevier Inc.

## 2022-05-27 NOTE — PROGRESS NOTES
[facility] Daily Progress Note    Date of Service  5/27/2022    Chief Complaint  Maral Herrera is a 56 y.o. female admitted 5/14/2022 with severe epistaxis for 3 days    Hospital Course  This is a 56-year-old female with a history of a brain tumor, alcohol use, methamphetamine use, severe dilated cardiomyopathy presumed from methamphetamine use, residing at [facility], had a reported ground-level fall approximately a month ago with periorbital swelling and maxillary and sphenoid ecchymosis, the patient is on aspirin as well as Xarelto at the nursing facility and develops a significant episode of epistaxis.  The patient has a cardiomyopathy with ejection fraction of 15%.  The patient was seen in this facility at the beginning of April, cardiology was consulted, the patient at the time positive for methamphetamine.  The patient has a history of longstanding alcohol abuse.  She does have cirrhosis and reportedly has a TIPS in place.  The patient was on Xarelto what appears to be for DVT prophylaxis at the nursing facility as well as aspirin for her heart condition.  The patient had reversal of her anticoagulation with Kcentra.  I had a conversation with the patient's mother today who gave additional history.  Apparently she had been diagnosed with a brain tumor and during the surgery the patient had severe bleeding and the procedure was aborted.  A recent CT scan of the brain shows encephalomalacia but no definitive tumor residual.  Patient is found to have a UTI, she is febrile and was started on empiric antibiotics with cultures pending.    Interval Problem Update  Patient seen and examined today.  Data, Medication data reviewed.  Case discussed with nursing as available.  Plan of Care reviewed with patient and notified of changes.  5/15, the patient is complaining of achiness, pain in her legs and arms, she is unwell, currently no further nosebleed noted.  T-max 100.3, heart rate in the  90s, respiration unlabored, 4 L nasal cannula oxygen, blood pressure initially down to 70/50, currently 104 with 60, given limited amount of fluid resuscitation secondary to the patient's poor ejection fraction.  Hemoglobin had been stable, the patient with significant hyponatremia, prerenal azotemia, total bilirubin of 4.6, albumin 2.1.  No iron deficiency identified, the patient severely coagulopathic initially elevated INR of 9.32, her TEG showed a platelet inhibition also, elevated procalcitonin,  Chest x-ray without cardiopulmonary disease, cardiomegaly is noted  5/16/2022:  Continue present medical management continue with ceftriaxone patient does have positive blood culture bottles gram-negative rods in addition to urinary culture is also positive for E. coli 10-50,000 colonies.  Repeat labs in the morning.  Patient with a MELD score of approximately 27.  Prognosis extremely guarded.  5/17: Patient seen and examined, afebrile, resting in bed, blood cultures 1/2 growing gram negative rods. Urine cultures growing ESBL. I have consulted infection disease  He prognosis is extremely guarded  Consulted palliative  5/18: No overnight events, afebrile, no nausea or vomiting. Now on meropenem for the ESBL in the urin and also in blood  ID following appreciate rec.  Due to her liver failure prognosis is extremely guarded  5/19: Patient resting in bed, afebrile, no overnight events, cont on meropenem as per ID rec.   Prognosis is poor palliative team following   5/20: Patient seen and examined resting in bed, Discussed with patiet again about her severe guarded prognosis due to HF, liver failure and also now with bacteremia.  Patient understand and open to talk with hospice.  Patient  discussed with hospice today and has chosen to transition to comfort care measures   5/21: Patient resting in bed, no overnight events, patient is now on comfort measures  Awaiting placement   5/22:  Patient seen  And examined, on comfort  care measures , accepted by hospice awaiting placement   5/23: Patient resting in bed,afebrile, no overnight events, no nausea or vomiting.on comfort care measures.  Awaiting placement.    5/24/2022:  Patient had agreeable quality are ongoing we will order chest x-ray for further elucidation patient had no acute events overnight offers no complaints this time denies objective fevers or chills no episodes of nosebleed.  Patient is awaiting placement to group home.  Continue present medical management.    5/25/2022:  Continue to look for group home for this patient no acute events overnight.  Continue Lasix dosing patient denies subjective fevers rigors chills.    5/26/2022  Hospice consult was placed hospice graciously ordered at his group home pending final approval from administration otherwise no acute events overnight patient denies subjective fevers rigors chills continue present medical management.    5/27/2022:  No acute changes patient has been pending hospice home for some time.  Patient has WARREN D score approximately 40 indicating significantly high mortality of approximately 60% in the next 3 months.  Patient will be transition to hospice group home when appropriate and accepted by facility.      I have personally seen and examined the patient at bedside. I discussed the plan of care with patient, bedside RN, charge RN, , pharmacy and infectious disease.    Consultants/Specialty  critical care  Infection disease     Code Status  DNAR/DNI    Disposition  Patient is not medically cleared for discharge.   Anticipate discharge to to skilled nursing facility.  I have placed the appropriate orders for post-discharge needs.    Review of Systems  Review of Systems   Constitutional: Negative for chills, fever and malaise/fatigue.   HENT: Negative for nosebleeds.    Eyes: Negative.    Respiratory: Negative.  Negative for cough.    Cardiovascular: Negative.  Negative for chest pain and palpitations.    Gastrointestinal: Negative.  Negative for heartburn, nausea and vomiting.   Genitourinary: Positive for urgency. Negative for dysuria and frequency.   Musculoskeletal: Positive for myalgias. Negative for back pain and neck pain.   Skin: Negative.  Negative for itching and rash.   Neurological: Positive for focal weakness and headaches. Negative for dizziness and weakness.   Endo/Heme/Allergies: Negative.  Negative for polydipsia. Does not bruise/bleed easily.   Psychiatric/Behavioral: Negative for depression. The patient is nervous/anxious.         Physical Exam  Temp:  [36.4 °C (97.5 °F)-37.4 °C (99.3 °F)] 37.4 °C (99.3 °F)  Pulse:  [54-68] 55  Resp:  [14-17] 14  BP: ()/(50-65) 98/65  SpO2:  [90 %-93 %] 93 %    Physical Exam  Vitals and nursing note reviewed.   Constitutional:       Appearance: She is well-developed and overweight. She is ill-appearing. She is not diaphoretic.      Interventions: Nasal cannula in place.   HENT:      Head: Normocephalic and atraumatic.      Comments: Ecchymosis, bruising     Nose: Nose normal.   Eyes:      Conjunctiva/sclera: Conjunctivae normal.      Pupils: Pupils are equal, round, and reactive to light.   Neck:      Thyroid: No thyromegaly.      Vascular: No JVD.   Cardiovascular:      Rate and Rhythm: Normal rate and regular rhythm.      Heart sounds: Normal heart sounds.     No friction rub. No gallop.   Pulmonary:      Effort: Pulmonary effort is normal.      Breath sounds: Normal breath sounds. No wheezing or rales.   Abdominal:      General: Bowel sounds are normal. There is no distension.      Palpations: Abdomen is soft. There is no mass.      Tenderness: There is no abdominal tenderness. There is no guarding or rebound.   Musculoskeletal:         General: Tenderness present. Normal range of motion.      Cervical back: Normal range of motion and neck supple.   Lymphadenopathy:      Cervical: No cervical adenopathy.   Skin:     General: Skin is warm and dry.       Coloration: Skin is jaundiced.      Findings: Bruising, lesion and rash present.   Neurological:      Mental Status: She is oriented to person, place, and time. She is lethargic.      Cranial Nerves: No cranial nerve deficit.   Psychiatric:         Behavior: Behavior normal.         Fluids    Intake/Output Summary (Last 24 hours) at 5/27/2022 1355  Last data filed at 5/27/2022 1051  Gross per 24 hour   Intake 580 ml   Output 700 ml   Net -120 ml       Laboratory                        Imaging  DX-CHEST-PORTABLE (1 VIEW)   Final Result      No radiographic evidence of acute pulmonary tuberculosis.      DX-CHEST-PORTABLE (1 VIEW)   Final Result         1.  No acute cardiopulmonary disease.   2.  Cardiomegaly   3.  Atherosclerosis           Assessment/Plan  * Epistaxis- (present on admission)  Assessment & Plan  Significant supratherapeutic INR appreciated on labs and we will hold Xarelto and administer Kcentra  Stopped for the time being  Recheck coags, hold antiplatelet and anticoagulation for the time being    Bacteremia  Assessment & Plan  Blood cultures 1/2 growing gram negative rods  Urine cultures growing ESBL  ID consulted appreciate rec.     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 coli 10-50,000 colonies in addition patient does have positive blood culture bottle for gram-negative rods.  Continue ceftriaxone 1 g continue fluid resuscitation as needed be mindful of  fluid resuscitation the patient has decreased albumin likely will end up start third spacing.  Patient continues to have elevated INR no overt signs of bleeding.  Patient with MELD score of 27.    Patient now transitioned to comfort care     Supratherapeutic INR- (present on admission)  Assessment & Plan  Greater than 9 with active bleeding we will administer PCC at this time  5/27/2022-improved    Anemia- (present on admission)  Assessment & Plan  Transfuse hemoglobin less than 7        Advance care planning- (present on admission)  Assessment & Plan    CODE STATUS obtained at bedside and patient wishes for DO NOT RESUSCITATE DO NOT INTUBATE CODE STATUS with nurse present/intensivist present and myself and she is alert and oriented x4.    Patient now decided to transition to comfort care     Prolonged Q-T interval on ECG- (present on admission)  Assessment & Plan  Avoid QTC prolonging agents  Ativan for nausea vomiting    Thrombocytopenia (HCC)- (present on admission)  Assessment & Plan  Platelet admission secondary to aspirin use  Current bleeding has stopped, monitor  Consider platelet transfusion if rebleeding  5/27/2022-improved      Severe protein-calorie malnutrition (HCC)- (present on admission)  Assessment & Plan  Dietary consult    Dilated cardiomyopathy (HCC)- (present on admission)  Assessment & Plan  Likely from longstanding methamphetamine abuse/alcoholism leading to her dilated cardiomyopathy.  Last ejection fraction 15% given 2 L in ED  Resume medication regimen once the patient tolerates    Patient transitioned to comfort care  5/27/2022  Symptomatic support    Macrocytosis- (present on admission)  Assessment & Plan  Secondary to longstanding alcoholism      Methamphetamine abuse (HCC)- (present on admission)  Assessment & Plan  Urine drug test ordered and pending  Dilated cardiomyopathy appreciated in history and last echocardiogram ejection fraction 15%    Patient now transitioned to comfort care      Liver cirrhosis (HCC)- (present on admission)  Assessment & Plan  Recall mutation of patient's meld score indicates a MELD score of approximately 40.       Medically complex high risk patient    VTE prophylaxis: pharmacologic prophylaxis contraindicated due to Bleeding    I have performed a physical exam and reviewed and updated ROS and Plan today (5/27/2022). In review of yesterday's note (5/26/2022), there are no changes except as documented above.

## 2022-05-27 NOTE — CARE PLAN
Problem: Knowledge Deficit - Standard  Goal: Patient and family/care givers will demonstrate understanding of plan of care, disease process/condition, diagnostic tests and medications  Outcome: Progressing     Problem: Pain - Standard  Goal: Alleviation of pain or a reduction in pain to the patient’s comfort goal  Outcome: Progressing     Problem: Skin Integrity  Goal: Skin integrity is maintained or improved  Outcome: Progressing     Problem: Fall Risk  Goal: Patient will remain free from falls  Outcome: Progressing     Problem: Psychosocial  Goal: Patient and family will demonstrate ability to cope with life altering diagnosis and/or procedure  Outcome: Progressing  Goal: Spiritual and cultural needs incorporated into hospitalization  Outcome: Progressing     Problem: Discharge Barriers/Planning  Goal: Patient's continuum of care needs are met  Outcome: Progressing  Flowsheets (Taken 5/27/2022 1219)  Continuum of Care Needs:   Assessed for discharge barriers   Involved patient/family/support system in discharge process   Collaborated with Case Management/  Note: Pt to discharge soon.      Problem: Hemodynamics  Goal: Patient's hemodynamics, fluid balance and neurologic status will be stable or improve  Outcome: Progressing     Problem: Mobility  Goal: Patient's capacity to carry out activities will improve  Outcome: Progressing  Flowsheets  Taken 5/27/2022 1219  Level of Mobility: Level IV  Assistance: Assistance of One  Taken 5/27/2022 0700  Activity Performed:   Ambulate   Back to bed     Problem: Self Care  Goal: Patient will have the ability to perform ADLs independently or with assistance (bathe, groom, dress, toilet and feed)  Outcome: Progressing       The patient is Stable - Low risk of patient condition declining or worsening    Shift Goals  Clinical Goals: Monitor patient; maintain safety and comfort; discharge planning.  Patient Goals: Comfort.  Family Goals: N/A    Progress made  toward(s) clinical / shift goals:  Progressing; pt to discharge soon to group home with hospice per .     Patient is not progressing towards the following goals:

## 2022-05-27 NOTE — CARE PLAN
Problem: Knowledge Deficit - Standard  Goal: Patient and family/care givers will demonstrate understanding of plan of care, disease process/condition, diagnostic tests and medications  Outcome: Progressing     Problem: Pain - Standard  Goal: Alleviation of pain or a reduction in pain to the patient’s comfort goal  Outcome: Progressing     Problem: Skin Integrity  Goal: Skin integrity is maintained or improved  Outcome: Progressing     Problem: Fall Risk  Goal: Patient will remain free from falls  Outcome: Progressing     Problem: Psychosocial  Goal: Patient's ability to re-evaluate and adapt role responsibilities will improve  Outcome: Progressing  Goal: Patient and family will demonstrate ability to cope with life altering diagnosis and/or procedure  Outcome: Progressing  Goal: Spiritual and cultural needs incorporated into hospitalization  Outcome: Progressing     Problem: Discharge Barriers/Planning  Goal: Patient's continuum of care needs are met  Outcome: Progressing     Problem: Hemodynamics  Goal: Patient's hemodynamics, fluid balance and neurologic status will be stable or improve  Outcome: Progressing     Problem: Fluid Volume  Goal: Fluid volume balance will be maintained  Outcome: Progressing     Problem: Gastrointestinal Irritability  Goal: Nausea and vomiting will be absent or improve  Outcome: Progressing     Problem: Mobility  Goal: Patient's capacity to carry out activities will improve  Outcome: Progressing     Problem: Self Care  Goal: Patient will have the ability to perform ADLs independently or with assistance (bathe, groom, dress, toilet and feed)  Outcome: Progressing     Problem: Infection - Standard  Goal: Patient will remain free from infection  Outcome: Progressing       The patient is Stable - Low risk of patient condition declining or worsening    Shift Goals  Clinical Goals: Maintain stable hemodynamics and comfort; dishcarge planning.  Patient Goals: Comfort.  Family Goals:  N/A    Progress made toward(s) clinical / shift goals:  Progressing    Patient is not progressing towards the following goals:

## 2022-05-27 NOTE — PROGRESS NOTES
Assumed care of patient. Pt is A+O x4. No chest pain or SOB. No active bleeding noted. Informed of safety and call system; call light within reach and bed alarm on.

## 2022-05-28 PROBLEM — D75.89 MACROCYTOSIS: Status: RESOLVED | Noted: 2022-04-10 | Resolved: 2022-05-28

## 2022-05-28 PROBLEM — R94.31 PROLONGED Q-T INTERVAL ON ECG: Status: RESOLVED | Noted: 2022-05-14 | Resolved: 2022-05-28

## 2022-05-28 PROCEDURE — 700102 HCHG RX REV CODE 250 W/ 637 OVERRIDE(OP): Performed by: INTERNAL MEDICINE

## 2022-05-28 PROCEDURE — A9270 NON-COVERED ITEM OR SERVICE: HCPCS | Performed by: STUDENT IN AN ORGANIZED HEALTH CARE EDUCATION/TRAINING PROGRAM

## 2022-05-28 PROCEDURE — 700102 HCHG RX REV CODE 250 W/ 637 OVERRIDE(OP): Performed by: STUDENT IN AN ORGANIZED HEALTH CARE EDUCATION/TRAINING PROGRAM

## 2022-05-28 PROCEDURE — A9270 NON-COVERED ITEM OR SERVICE: HCPCS | Performed by: INTERNAL MEDICINE

## 2022-05-28 PROCEDURE — 99231 SBSQ HOSP IP/OBS SF/LOW 25: CPT | Performed by: INTERNAL MEDICINE

## 2022-05-28 PROCEDURE — 770004 HCHG ROOM/CARE - ONCOLOGY PRIVATE *

## 2022-05-28 RX ORDER — DIPHENHYDRAMINE HCL 25 MG
25 TABLET ORAL EVERY 6 HOURS PRN
Status: DISCONTINUED | OUTPATIENT
Start: 2022-05-28 | End: 2022-06-17 | Stop reason: HOSPADM

## 2022-05-28 RX ORDER — ATROPINE SULFATE 10 MG/ML
2 SOLUTION/ DROPS OPHTHALMIC EVERY 4 HOURS PRN
Status: CANCELLED | OUTPATIENT
Start: 2022-05-28

## 2022-05-28 RX ADMIN — METOPROLOL SUCCINATE 25 MG: 25 TABLET, EXTENDED RELEASE ORAL at 05:17

## 2022-05-28 RX ADMIN — OXYCODONE 5 MG: 5 TABLET ORAL at 02:45

## 2022-05-28 RX ADMIN — ATORVASTATIN CALCIUM 20 MG: 20 TABLET, FILM COATED ORAL at 17:53

## 2022-05-28 RX ADMIN — OXYCODONE 5 MG: 5 TABLET ORAL at 21:40

## 2022-05-28 RX ADMIN — OXYCODONE 5 MG: 5 TABLET ORAL at 08:14

## 2022-05-28 RX ADMIN — DIPHENHYDRAMINE HYDROCHLORIDE 25 MG: 25 TABLET ORAL at 21:31

## 2022-05-28 RX ADMIN — FUROSEMIDE 20 MG: 20 TABLET ORAL at 05:18

## 2022-05-28 RX ADMIN — OXYCODONE 5 MG: 5 TABLET ORAL at 17:53

## 2022-05-28 ASSESSMENT — PAIN DESCRIPTION - PAIN TYPE
TYPE: ACUTE PAIN;CHRONIC PAIN
TYPE: ACUTE PAIN

## 2022-05-28 ASSESSMENT — ENCOUNTER SYMPTOMS
NEUROLOGICAL NEGATIVE: 1
MUSCULOSKELETAL NEGATIVE: 1
CARDIOVASCULAR NEGATIVE: 1
GASTROINTESTINAL NEGATIVE: 1
RESPIRATORY NEGATIVE: 1
EYES NEGATIVE: 1
PSYCHIATRIC NEGATIVE: 1

## 2022-05-28 NOTE — PROGRESS NOTES
Hospital Medicine Daily Progress Note    Date of Service  5/28/2022    Chief Complaint  Maral Herrera is a 56 y.o. female admitted 5/14/2022 with 3-day history of severe epistaxis.  She has a known brain tumor, substance abuse (methamphetamine, alcohol, marijuana), severe dilated cardiomyopathy with echocardiogram on 4/11/2022 showing LVEF of about 15-20% (resumed to be due to methamphetamine use), cirrhosis (reportedly has TIPS in place).  She resides in a nursing home and was on Xarelto for DVT prophylaxis.  Patient underwent surgery for her brain tumor, procedure was aborted due to severe bleeding.    Hospital Course  On admission, patient was febrile with a UTI.  Blood and urine cultures from 5/15/2022 grew ESBL E. Coli.  Patient developed septic shock which resolved after antibiotics and fluid resuscitation.  She completed her course of antibiotics with meropenem.  She has been thrombocytopenic since admission.    Due to the patient's multiple underlying conditions including brain tumor, severe cardiomyopathy with LVEF of 15%, cirrhosis (meld score of about 40), resulting in guarded prognosis, patient decided to pursue hospice with transition to comfort care.    Interval Problem Update  Patient was transferred to oncology floor on 5/27/2022.  She is afebrile and hemodynamically stable.  Her pain is controlled.    I have personally seen and examined the patient at bedside. I discussed the plan of care with bedside RN.    Consultants/Specialty  critical care and infectious disease    Code Status  DNAR/DNI    Disposition  Patient is medically cleared for discharge.   Anticipate discharge to to hospice.  I have placed the appropriate orders for post-discharge needs.    Review of Systems  Review of Systems   Constitutional: Positive for malaise/fatigue.   HENT: Negative.    Eyes: Negative.    Respiratory: Negative.    Cardiovascular: Negative.    Gastrointestinal: Negative.    Genitourinary: Negative.     Musculoskeletal: Negative.    Skin: Negative.    Neurological: Negative.    Endo/Heme/Allergies: Negative.    Psychiatric/Behavioral: Negative.         Physical Exam  Temp:  [36.4 °C (97.6 °F)-37.4 °C (99.3 °F)] 37.1 °C (98.7 °F)  Pulse:  [55-73] 60  Resp:  [14-18] 18  BP: ()/(60-71) 131/60  SpO2:  [90 %-95 %] 93 %    Physical Exam  Constitutional:       Appearance: She is ill-appearing.      Comments: cachectic   HENT:      Head: Normocephalic.      Nose: Nose normal.      Mouth/Throat:      Mouth: Mucous membranes are dry.   Eyes:      Pupils: Pupils are equal, round, and reactive to light.   Cardiovascular:      Rate and Rhythm: Normal rate.   Pulmonary:      Effort: Pulmonary effort is normal.   Abdominal:      Palpations: Abdomen is soft.   Musculoskeletal:      Cervical back: Normal range of motion.      Right lower leg: No edema.      Left lower leg: No edema.   Skin:     General: Skin is warm.      Coloration: Skin is jaundiced.   Neurological:      Mental Status: She is alert. Mental status is at baseline.   Psychiatric:         Mood and Affect: Mood normal.         Fluids    Intake/Output Summary (Last 24 hours) at 5/28/2022 1233  Last data filed at 5/28/2022 0822  Gross per 24 hour   Intake 120 ml   Output --   Net 120 ml       Laboratory                        Imaging  DX-CHEST-PORTABLE (1 VIEW)   Final Result      No radiographic evidence of acute pulmonary tuberculosis.      DX-CHEST-PORTABLE (1 VIEW)   Final Result         1.  No acute cardiopulmonary disease.   2.  Cardiomegaly   3.  Atherosclerosis           Assessment/Plan  * Epistaxis- (present on admission)  Assessment & Plan  Resolved.  INR was elevated on admission.  On comfort care    Bacteremia  Assessment & Plan  Completed antibiotic course for ESBL E.Coli bacteremia. 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 Awaiting Hospice arrangements    Acute cystitis without  hematuria  Assessment & Plan  Completed antibiotic course    Hyponatremia- (present on admission)  Assessment & Plan  Secondary to cirrhosis.  On comfort care     Septic shock (HCC)  Assessment & Plan  Resolved.  Secondary to E. coli ESBL bacteremia and UTI.  Now on comfort care, pending placement for hospice    Supratherapeutic INR- (present on admission)  Assessment & Plan  INR was supratherapeutic on admission, likely secondary to liver failure. Improved.    Anemia- (present on admission)  Assessment & Plan  Comfort Care    Advance care planning- (present on admission)  Assessment & Plan  CODE STATUS was obtained. Patient is DNR/DNI. On comfort care.    Thrombocytopenia (HCC)- (present on admission)  Assessment & Plan  Likely secondary to liver cirrhosis.  On comfort care, awaiting placement for hospice.    Severe protein-calorie malnutrition (HCC)- (present on admission)  Assessment & Plan  On comfort care    Dilated cardiomyopathy (HCC)- (present on admission)  Assessment & Plan  Secondary to substance abuse.  Echocardiogram from 4/11/2022 showed LVEF of ~ 15-20%. On comfort Care    Methamphetamine abuse (HCC)- (present on admission)  Assessment & Plan  On comfort care    Liver cirrhosis (HCC)- (present on admission)  Assessment & Plan  Latest meld score was about 40.  Comfort care       VTE prophylaxis: Comfort Care

## 2022-05-28 NOTE — CARE PLAN
The patient is Stable - Low risk of patient condition declining or worsening    Shift Goals  Clinical Goals: Monitor patient; maintain safety and comfort; discharge planning.  Patient Goals: Comfort.  Family Goals: N/A    Progress made toward(s) clinical / shift goals:    Problem: Fall Risk  Goal: Patient will remain free from falls  Outcome: Progressing     Problem: Discharge Barriers/Planning  Goal: Patient's continuum of care needs are met  Outcome: Progressing       Patient is not progressing towards the following goals:

## 2022-05-28 NOTE — CARE PLAN
The patient is Stable - Low risk of patient condition declining or worsening    Shift Goals  Clinical Goals: Maintain safety and comfort, d/c to  with hospice  Patient Goals: comfort, pain control  Family Goals: N/A    Progress made toward(s) clinical / shift goals: POC/medications discussed with pt. Pt reported of lower/mid back pain rated 8/10. Medicated per MAR for pain. All fall precautions in place. Bed alarm is on. Pt is to be discharge soon to  with hospice.      Problem: Knowledge Deficit - Standard  Goal: Patient and family/care givers will demonstrate understanding of plan of care, disease process/condition, diagnostic tests and medications  Outcome: Progressing     Problem: Pain - Standard  Goal: Alleviation of pain or a reduction in pain to the patient’s comfort goal  Outcome: Progressing     Problem: Fall Risk  Goal: Patient will remain free from falls  Outcome: Progressing     Problem: Discharge Barriers/Planning  Goal: Patient's continuum of care needs are met  Outcome: Progressing

## 2022-05-28 NOTE — PROGRESS NOTES
Bedside report received from nurse. Assumed care of pt. Pt resting comfortably in bed. A/Ox4, VSS, RA, medicated per MAR for pain, no signs of bleeding, still has right sided weakness, purewick in place, snack provided and all needs met. POC reviewed and white board updated. Call light in reach. Bed locked in lowest position with 2 upper bed rails up.

## 2022-05-28 NOTE — PROGRESS NOTES
4 Eyes Skin Assessment Completed by DANNY Daly and Lizz RN.    Head WDL  Ears WDL  Nose WDL  Mouth WDL  Neck WDL  Breast/Chest WDL  Shoulder Blades WDL  Spine WDL  (R) Arm/Elbow/Hand WDL  (L) Arm/Elbow/Hand WDL  Abdomen WDL  Groin WDL  Scrotum/Coccyx/Buttocks WDL  (R) Leg WDL  (L) Leg Abrasion  (R) Heel/Foot/Toe Scar  (L) Heel/Foot/Toe WDL          Devices In Places Pulse Ox      Interventions In Place Pillows    Possible Skin Injury No    Pictures Uploaded Into Epic N/A  Wound Consult Placed N/A  RN Wound Prevention Protocol Ordered No

## 2022-05-29 PROCEDURE — A9270 NON-COVERED ITEM OR SERVICE: HCPCS | Performed by: STUDENT IN AN ORGANIZED HEALTH CARE EDUCATION/TRAINING PROGRAM

## 2022-05-29 PROCEDURE — 700102 HCHG RX REV CODE 250 W/ 637 OVERRIDE(OP): Performed by: STUDENT IN AN ORGANIZED HEALTH CARE EDUCATION/TRAINING PROGRAM

## 2022-05-29 PROCEDURE — 770004 HCHG ROOM/CARE - ONCOLOGY PRIVATE *

## 2022-05-29 PROCEDURE — A9270 NON-COVERED ITEM OR SERVICE: HCPCS | Performed by: INTERNAL MEDICINE

## 2022-05-29 PROCEDURE — 99231 SBSQ HOSP IP/OBS SF/LOW 25: CPT | Performed by: INTERNAL MEDICINE

## 2022-05-29 PROCEDURE — 700102 HCHG RX REV CODE 250 W/ 637 OVERRIDE(OP): Performed by: INTERNAL MEDICINE

## 2022-05-29 RX ADMIN — ATORVASTATIN CALCIUM 20 MG: 20 TABLET, FILM COATED ORAL at 18:34

## 2022-05-29 RX ADMIN — SCOPALAMINE 1 PATCH: 1 PATCH, EXTENDED RELEASE TRANSDERMAL at 12:04

## 2022-05-29 RX ADMIN — METOPROLOL SUCCINATE 25 MG: 25 TABLET, EXTENDED RELEASE ORAL at 04:47

## 2022-05-29 RX ADMIN — OXYCODONE 5 MG: 5 TABLET ORAL at 12:00

## 2022-05-29 RX ADMIN — OXYCODONE 5 MG: 5 TABLET ORAL at 18:37

## 2022-05-29 RX ADMIN — OXYCODONE 5 MG: 5 TABLET ORAL at 02:43

## 2022-05-29 RX ADMIN — FUROSEMIDE 20 MG: 20 TABLET ORAL at 04:47

## 2022-05-29 RX ADMIN — LORAZEPAM 1 MG: 2 SOLUTION, CONCENTRATE ORAL at 01:11

## 2022-05-29 ASSESSMENT — PAIN DESCRIPTION - PAIN TYPE
TYPE: ACUTE PAIN

## 2022-05-29 NOTE — CARE PLAN
The patient is Stable - Low risk of patient condition declining or worsening    Shift Goals  Clinical Goals: safety  Patient Goals: rest  Family Goals: N/A    Progress made toward(s) clinical / shift goals:    Problem: Skin Integrity  Goal: Skin integrity is maintained or improved  Outcome: Progressing  Note: Purewick in use, pts skin noted to be scratched, pt endorses scratching. Order obtained for prn Benadryl, pt wanted to wait until later to take. Lotion applied to dry skin areas and educated pt on not scratching and risk of infection if she breaks the skin. Pt verbalized understanding.      Problem: Fall Risk  Goal: Patient will remain free from falls  Outcome: Progressing  Note: Pt calls for assistance. Bed alarm on.        Patient is not progressing towards the following goals:

## 2022-05-29 NOTE — PROGRESS NOTES
Hospital Medicine Daily Progress Note    Date of Service  5/29/2022    Chief Complaint  Maral Herrera is a 56 y.o. female admitted 5/14/2022 with 3-day history of severe epistaxis.  She has a known brain tumor, substance abuse (methamphetamine, alcohol, marijuana), severe dilated cardiomyopathy with echocardiogram on 4/11/2022 showing LVEF of about 15-20% (resumed to be due to methamphetamine use), cirrhosis (reportedly has TIPS in place).  She resides in a nursing home and was on Xarelto for DVT prophylaxis.  Patient underwent surgery for her brain tumor, procedure was aborted due to severe bleeding.     Hospital Course  On admission, patient was febrile with a UTI.  Blood and urine cultures from 5/15/2022 grew ESBL E. Coli.  Patient developed septic shock which resolved after antibiotics and fluid resuscitation.  She completed her course of antibiotics with meropenem.  She has been thrombocytopenic since admission.     Due to the patient's multiple underlying conditions including brain tumor, severe cardiomyopathy with LVEF of 15%, cirrhosis (meld score of about 40), resulting in guarded prognosis, patient decided to pursue hospice with transition to comfort care.     Interval Problem Update  Patient was transferred to oncology floor on 5/27/2022.  She is afebrile and hemodynamically stable.  Her pain is controlled.     I have personally seen and examined the patient at bedside. I discussed the plan of care with bedside RN.     Consultants/Specialty  critical care and infectious disease     Code Status  DNAR/DNI     Disposition  Patient is medically cleared for discharge.   Anticipate discharge to to hospice.  I have placed the appropriate orders for post-discharge needs.     Review of Systems  Review of Systems   Constitutional: Positive for malaise/fatigue.   HENT: Negative.    Eyes: Negative.    Respiratory: Negative.    Cardiovascular: Negative.    Gastrointestinal: Negative.    Genitourinary: Negative.     Musculoskeletal: Negative.    Skin: Negative.    Neurological: Negative.    Endo/Heme/Allergies: Negative.    Psychiatric/Behavioral: Negative.        Physical Exam  Temp:  [37 °C (98.6 °F)-37.2 °C (99 °F)] 37.2 °C (98.9 °F)  Pulse:  [60-69] 69  Resp:  [18] 18  BP: (101-131)/(51-63) 108/63  SpO2:  [90 %-94 %] 90 %      Physical Exam  Constitutional:       Appearance: She is ill-appearing.      Comments: cachectic   HENT:      Head: Normocephalic.      Nose: Nose normal.      Mouth/Throat:      Mouth: Mucous membranes are dry.   Eyes:      Pupils: Pupils are equal, round, and reactive to light.   Cardiovascular:      Rate and Rhythm: Normal rate.   Pulmonary:      Effort: Pulmonary effort is normal.   Abdominal:      Palpations: Abdomen is soft.   Musculoskeletal:      Cervical back: Normal range of motion.      Right lower leg: No edema.      Left lower leg: No edema.   Skin:     General: Skin is warm.      Coloration: Skin is jaundiced.   Neurological:      Mental Status: She is alert. Mental status is at baseline.   Psychiatric:         Mood and Affect: Mood normal.       Fluids    Intake/Output Summary (Last 24 hours) at 5/29/2022 0752  Last data filed at 5/29/2022 0650  Gross per 24 hour   Intake 1020 ml   Output 200 ml   Net 820 ml       Laboratory                        Imaging  DX-CHEST-PORTABLE (1 VIEW)   Final Result      No radiographic evidence of acute pulmonary tuberculosis.      DX-CHEST-PORTABLE (1 VIEW)   Final Result         1.  No acute cardiopulmonary disease.   2.  Cardiomegaly   3.  Atherosclerosis           Assessment/Plan  * Epistaxis- (present on admission)  Assessment & Plan  Resolved.  INR was elevated on admission.  On comfort care    Bacteremia  Assessment & Plan  Completed antibiotic course for ESBL E.Coli bacteremia. 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 Awaiting Hospice arrangements    Acute cystitis without  hematuria  Assessment & Plan  Completed antibiotic course    Hyponatremia- (present on admission)  Assessment & Plan  Secondary to cirrhosis.  On comfort care     Septic shock (HCC)  Assessment & Plan  Resolved.  Secondary to E. coli ESBL bacteremia and UTI.  Now on comfort care, pending placement for hospice    Supratherapeutic INR- (present on admission)  Assessment & Plan  INR was supratherapeutic on admission, likely secondary to liver failure. Improved.    Anemia- (present on admission)  Assessment & Plan  Comfort Care    Advance care planning- (present on admission)  Assessment & Plan  CODE STATUS was obtained. Patient is DNR/DNI. On comfort care.    Thrombocytopenia (HCC)- (present on admission)  Assessment & Plan  Likely secondary to liver cirrhosis.  On comfort care, awaiting placement for hospice.    Severe protein-calorie malnutrition (HCC)- (present on admission)  Assessment & Plan  On comfort care    Dilated cardiomyopathy (HCC)- (present on admission)  Assessment & Plan  Secondary to substance abuse.  Echocardiogram from 4/11/2022 showed LVEF of ~ 15-20%. On comfort Care    Methamphetamine abuse (HCC)- (present on admission)  Assessment & Plan  On comfort care    Liver cirrhosis (HCC)- (present on admission)  Assessment & Plan  Latest meld score was about 40.  Comfort care         VTE prophylaxis: Comfort Care    I have performed a physical exam and reviewed and updated ROS and Plan today (5/29/2022). In review of yesterday's note (5/28/2022), there are no changes except as documented above.

## 2022-05-29 NOTE — CARE PLAN
Problem: Pain - Standard  Goal: Alleviation of pain or a reduction in pain to the patient’s comfort goal  Outcome: Progressing  Note: Patient c/o 7/10 back pain with some relief from prn pain med.     Problem: Skin Integrity  Goal: Skin integrity is maintained or improved  Outcome: Progressing  Note: No new skin breakdown noted, moves well in bed, prn benadryl given for itching.     Problem: Hemodynamics  Goal: Patient's hemodynamics, fluid balance and neurologic status will be stable or improve  Outcome: Progressing  Note: VSS with no signs of respiratory distress at room air, alert and orientedx3, occasionally forgetful, appetite good, contact isolation observed for esbl urine as ordered   The patient is Stable - Low risk of patient condition declining or worsening    Shift Goals  Clinical Goals: comfort; safety  Patient Goals: rest  Family Goals: N/A    Progress made toward(s) clinical / shift goals:      Patient is not progressing towards the following goals:

## 2022-05-30 PROCEDURE — 700102 HCHG RX REV CODE 250 W/ 637 OVERRIDE(OP): Performed by: STUDENT IN AN ORGANIZED HEALTH CARE EDUCATION/TRAINING PROGRAM

## 2022-05-30 PROCEDURE — 770004 HCHG ROOM/CARE - ONCOLOGY PRIVATE *

## 2022-05-30 PROCEDURE — A9270 NON-COVERED ITEM OR SERVICE: HCPCS | Performed by: STUDENT IN AN ORGANIZED HEALTH CARE EDUCATION/TRAINING PROGRAM

## 2022-05-30 PROCEDURE — 99231 SBSQ HOSP IP/OBS SF/LOW 25: CPT | Performed by: INTERNAL MEDICINE

## 2022-05-30 RX ADMIN — METOPROLOL SUCCINATE 25 MG: 25 TABLET, EXTENDED RELEASE ORAL at 06:26

## 2022-05-30 RX ADMIN — FUROSEMIDE 20 MG: 20 TABLET ORAL at 06:26

## 2022-05-30 RX ADMIN — OXYCODONE 5 MG: 5 TABLET ORAL at 16:04

## 2022-05-30 RX ADMIN — ATORVASTATIN CALCIUM 20 MG: 20 TABLET, FILM COATED ORAL at 16:04

## 2022-05-30 ASSESSMENT — COGNITIVE AND FUNCTIONAL STATUS - GENERAL
DAILY ACTIVITIY SCORE: 18
WALKING IN HOSPITAL ROOM: A LOT
DRESSING REGULAR LOWER BODY CLOTHING: A LITTLE
SUGGESTED CMS G CODE MODIFIER MOBILITY: CL
MOBILITY SCORE: 14
STANDING UP FROM CHAIR USING ARMS: A LOT
DRESSING REGULAR UPPER BODY CLOTHING: A LITTLE
SUGGESTED CMS G CODE MODIFIER DAILY ACTIVITY: CK
HELP NEEDED FOR BATHING: A LOT
CLIMB 3 TO 5 STEPS WITH RAILING: A LOT
MOVING FROM LYING ON BACK TO SITTING ON SIDE OF FLAT BED: A LOT
MOVING TO AND FROM BED TO CHAIR: A LOT
TOILETING: A LOT

## 2022-05-30 ASSESSMENT — PAIN DESCRIPTION - PAIN TYPE
TYPE: ACUTE PAIN
TYPE: ACUTE PAIN

## 2022-05-30 NOTE — PROGRESS NOTES
Hospital Medicine Daily Progress Note    Date of Service  5/30/2022    Chief Complaint  Maral Herrera is a 56 y.o. female admitted 5/14/2022 with 3-day history of severe epistaxis.  She has a known brain tumor, substance abuse (methamphetamine, alcohol, marijuana), severe dilated cardiomyopathy with echocardiogram on 4/11/2022 showing LVEF of about 15-20% (resumed to be due to methamphetamine use), cirrhosis (reportedly has TIPS in place).  She resides in a nursing home and was on Xarelto for DVT prophylaxis.  Patient underwent surgery for her brain tumor, procedure was aborted due to severe bleeding.     Hospital Course  On admission, patient was febrile with a UTI.  Blood and urine cultures from 5/15/2022 grew ESBL E. Coli.  Patient developed septic shock which resolved after antibiotics and fluid resuscitation.  She completed her course of antibiotics with meropenem.  She has been thrombocytopenic since admission.     Due to the patient's multiple underlying conditions including brain tumor, severe cardiomyopathy with LVEF of 15%, cirrhosis (meld score of about 40), resulting in guarded prognosis, patient decided to pursue hospice with transition to comfort care.     Interval Problem Update  Patient was transferred to oncology floor on 5/27/2022.  She is afebrile and hemodynamically stable.  Her pain is controlled.     I have personally seen and examined the patient at bedside. I discussed the plan of care with bedside RN.     Consultants/Specialty  critical care and infectious disease     Code Status  DNAR/DNI     Disposition  Patient is medically cleared for discharge.   Anticipate discharge to to hospice.  I have placed the appropriate orders for post-discharge needs.     Review of Systems  Review of Systems   Constitutional: Positive for malaise/fatigue.   HENT: Negative.    Eyes: Negative.    Respiratory: Negative.    Cardiovascular: Negative.    Gastrointestinal: Negative.    Genitourinary: Negative.     Musculoskeletal: Negative.    Skin: Negative.    Neurological: Negative.    Endo/Heme/Allergies: Negative.    Psychiatric/Behavioral: Negative.         Physical Exam  Temp:  [36.1 °C (97 °F)-36.9 °C (98.4 °F)] 36.1 °C (97 °F)  Pulse:  [57-69] 65  Resp:  [14-16] 14  BP: (101-107)/(62-72) 107/72  SpO2:  [91 %-92 %] 91 %    Physical Exam  Constitutional:       Appearance: She is ill-appearing.      Comments: cachectic   HENT:      Head: Normocephalic.      Nose: Nose normal.      Mouth/Throat:      Mouth: Mucous membranes are dry.   Eyes:      Pupils: Pupils are equal, round, and reactive to light.   Cardiovascular:      Rate and Rhythm: Normal rate.   Pulmonary:      Effort: Pulmonary effort is normal.   Abdominal:      Palpations: Abdomen is soft.   Musculoskeletal:      Cervical back: Normal range of motion.      Right lower leg: No edema.      Left lower leg: No edema.   Skin:     General: Skin is warm.      Coloration: Skin is jaundiced.   Neurological:      Mental Status: She is alert. Mental status is at baseline.   Psychiatric:         Mood and Affect: Mood normal.     Fluids    Intake/Output Summary (Last 24 hours) at 5/30/2022 0731  Last data filed at 5/30/2022 0355  Gross per 24 hour   Intake 718 ml   Output 1000 ml   Net -282 ml       Laboratory                        Imaging  DX-CHEST-PORTABLE (1 VIEW)   Final Result      No radiographic evidence of acute pulmonary tuberculosis.      DX-CHEST-PORTABLE (1 VIEW)   Final Result         1.  No acute cardiopulmonary disease.   2.  Cardiomegaly   3.  Atherosclerosis           Assessment/Plan  * Epistaxis- (present on admission)  Assessment & Plan  Resolved.  INR was elevated on admission.  On comfort care    Bacteremia  Assessment & Plan  Completed antibiotic course for ESBL E.Coli bacteremia. 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 Awaiting Hospice arrangements    Acute cystitis without  hematuria  Assessment & Plan  Completed antibiotic course    Hyponatremia- (present on admission)  Assessment & Plan  Secondary to cirrhosis.  On comfort care     Septic shock (HCC)  Assessment & Plan  Resolved.  Secondary to E. coli ESBL bacteremia and UTI.  Now on comfort care, pending placement for hospice    Supratherapeutic INR- (present on admission)  Assessment & Plan  INR was supratherapeutic on admission, likely secondary to liver failure. Improved.    Anemia- (present on admission)  Assessment & Plan  Comfort Care    Advance care planning- (present on admission)  Assessment & Plan  CODE STATUS was obtained. Patient is DNR/DNI. On comfort care.    Thrombocytopenia (HCC)- (present on admission)  Assessment & Plan  Likely secondary to liver cirrhosis.  On comfort care, awaiting placement for hospice.    Severe protein-calorie malnutrition (HCC)- (present on admission)  Assessment & Plan  On comfort care    Dilated cardiomyopathy (HCC)- (present on admission)  Assessment & Plan  Secondary to substance abuse.  Echocardiogram from 4/11/2022 showed LVEF of ~ 15-20%. On comfort Care    Methamphetamine abuse (HCC)- (present on admission)  Assessment & Plan  On comfort care    Liver cirrhosis (HCC)- (present on admission)  Assessment & Plan  Latest meld score was about 40.  Comfort care         VTE prophylaxis: Comfort Care    I have performed a physical exam and reviewed and updated ROS and Plan today (5/30/2022). In review of yesterday's note (5/29/2022), there are no changes except as documented above.

## 2022-05-30 NOTE — CARE PLAN
The patient is Stable - Low risk of patient condition declining or worsening    Shift Goals  Clinical Goals: Safety  Patient Goals: Rest  Family Goals: na    Progress made toward(s) clinical / shift goals:        Problem: Fall Risk  Goal: Patient will remain free from falls  Outcome: Progressing  Note: Pt is A&Ox4, forgetful, R sided weakness, bed alarm on for safety, call light within reach.

## 2022-05-31 PROCEDURE — 700102 HCHG RX REV CODE 250 W/ 637 OVERRIDE(OP): Performed by: STUDENT IN AN ORGANIZED HEALTH CARE EDUCATION/TRAINING PROGRAM

## 2022-05-31 PROCEDURE — 99231 SBSQ HOSP IP/OBS SF/LOW 25: CPT | Performed by: INTERNAL MEDICINE

## 2022-05-31 PROCEDURE — A9270 NON-COVERED ITEM OR SERVICE: HCPCS | Performed by: STUDENT IN AN ORGANIZED HEALTH CARE EDUCATION/TRAINING PROGRAM

## 2022-05-31 PROCEDURE — 770004 HCHG ROOM/CARE - ONCOLOGY PRIVATE *

## 2022-05-31 RX ADMIN — OXYCODONE 5 MG: 5 TABLET ORAL at 05:55

## 2022-05-31 RX ADMIN — ATORVASTATIN CALCIUM 20 MG: 20 TABLET, FILM COATED ORAL at 16:04

## 2022-05-31 ASSESSMENT — PAIN DESCRIPTION - PAIN TYPE
TYPE: ACUTE PAIN

## 2022-05-31 NOTE — CARE PLAN
The patient is Watcher - Medium risk of patient condition declining or worsening    Shift Goals  Clinical Goals: Pain Control / Remain free of falls  Patient Goals: Pain Control  Family Goals: na    Progress made toward(s) clinical / shift goals:    Problem: Pain - Standard  Goal: Alleviation of pain or a reduction in pain to the patient’s comfort goal  Outcome: Progressing     Problem: Fall Risk  Goal: Patient will remain free from falls  Outcome: Progressing     Problem: Hemodynamics  Goal: Patient's hemodynamics, fluid balance and neurologic status will be stable or improve  Outcome: Progressing       Patient is not progressing towards the following goals:

## 2022-05-31 NOTE — CARE PLAN
The patient is Watcher - Medium risk of patient condition declining or worsening    Shift Goals  Clinical Goals: safety  Patient Goals: sleep  Family Goals: na    Progress made toward(s) clinical / shift goals:      Problem: Knowledge Deficit - Standard  Goal: Patient and family/care givers will demonstrate understanding of plan of care, disease process/condition, diagnostic tests and medications  Outcome: Progressing  Note: A/Ox4, can be forgetful, pt is able to understand plan of care at this time. All questions answered at the moment.       Problem: Skin Integrity  Goal: Skin integrity is maintained or improved  Outcome: Progressing  Note: Pt is able to turn self in bed, frequent rounding to ensure pt remains dry.      Problem: Fall Risk  Goal: Patient will remain free from falls  Outcome: Progressing  Note: Fall precautions in place, bed in lowest position, bed alarm on, call light and belongings in reach.   Pt calls appropriately.         Patient is not progressing towards the following goals:

## 2022-05-31 NOTE — PROGRESS NOTES
Hospital Medicine Daily Progress Note    Date of Service  5/31/2022    Chief Complaint  Maral Herrera is a 56 y.o. female admitted 5/14/2022 with 3-day history of severe epistaxis.  She has a known brain tumor, substance abuse (methamphetamine, alcohol, marijuana), severe dilated cardiomyopathy with echocardiogram on 4/11/2022 showing LVEF of about 15-20% (resumed to be due to methamphetamine use), cirrhosis (reportedly has TIPS in place).  She resides in a nursing home and was on Xarelto for DVT prophylaxis.  Patient underwent surgery for her brain tumor, procedure was aborted due to severe bleeding.     Hospital Course  On admission, patient was febrile with a UTI.  Blood and urine cultures from 5/15/2022 grew ESBL E. Coli.  Patient developed septic shock which resolved after antibiotics and fluid resuscitation.  She completed her course of antibiotics with meropenem.  She has been thrombocytopenic since admission.     Due to the patient's multiple underlying conditions including brain tumor, severe cardiomyopathy with LVEF of 15%, cirrhosis (meld score of about 40), resulting in guarded prognosis, patient decided to pursue hospice with transition to comfort care.     Interval Problem Update  Patient was transferred to oncology floor on 5/27/2022.  She is afebrile and hemodynamically stable.  Her pain is controlled.     I have personally seen and examined the patient at bedside. I discussed the plan of care with bedside RN.     Consultants/Specialty  critical care and infectious disease     Code Status  DNAR/DNI     Disposition  Patient is medically cleared for discharge.   Anticipate discharge to to hospice.  I have placed the appropriate orders for post-discharge needs.     Review of Systems  Review of Systems   Constitutional: Positive for malaise/fatigue.   HENT: Negative.    Eyes: Negative.    Respiratory: Negative.    Cardiovascular: Negative.    Gastrointestinal: Negative.    Genitourinary: Negative.     Musculoskeletal: Negative.    Skin: Negative.    Neurological: Negative.    Endo/Heme/Allergies: Negative.    Psychiatric/Behavioral: Negative.      Physical Exam  Temp:  [36.3 °C (97.4 °F)-36.8 °C (98.3 °F)] 36.3 °C (97.4 °F)  Pulse:  [58-69] 58  Resp:  [17-18] 18  BP: ()/(49-65) 102/55  SpO2:  [91 %-96 %] 96 %    Physical Exam  Constitutional:       Appearance: She is ill-appearing.      Comments: cachectic   HENT:      Head: Normocephalic.      Nose: Nose normal.      Mouth/Throat:      Mouth: Mucous membranes are dry.   Eyes:      Pupils: Pupils are equal, round, and reactive to light.   Cardiovascular:      Rate and Rhythm: Normal rate.   Pulmonary:      Effort: Pulmonary effort is normal.   Abdominal:      Palpations: Abdomen is soft.   Musculoskeletal:      Cervical back: Normal range of motion.      Right lower leg: No edema.      Left lower leg: No edema.   Skin:     General: Skin is warm.      Coloration: Skin is jaundiced.   Neurological:      Mental Status: She is alert. Mental status is at baseline.   Psychiatric:         Mood and Affect: Mood normal.     Fluids    Intake/Output Summary (Last 24 hours) at 5/31/2022 1439  Last data filed at 5/31/2022 1323  Gross per 24 hour   Intake 560 ml   Output 800 ml   Net -240 ml       Laboratory                        Imaging  DX-CHEST-PORTABLE (1 VIEW)   Final Result      No radiographic evidence of acute pulmonary tuberculosis.      DX-CHEST-PORTABLE (1 VIEW)   Final Result         1.  No acute cardiopulmonary disease.   2.  Cardiomegaly   3.  Atherosclerosis           Assessment/Plan  * Epistaxis- (present on admission)  Assessment & Plan  Resolved.  INR was elevated on admission.  On comfort care    Bacteremia  Assessment & Plan  Completed antibiotic course for ESBL E.Coli bacteremia. 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 Awaiting Hospice arrangements    Acute cystitis without  hematuria  Assessment & Plan  Completed antibiotic course    Hyponatremia- (present on admission)  Assessment & Plan  Secondary to cirrhosis.  On comfort care     Septic shock (HCC)  Assessment & Plan  Resolved.  Secondary to E. coli ESBL bacteremia and UTI.  Now on comfort care, pending placement for hospice    Supratherapeutic INR- (present on admission)  Assessment & Plan  INR was supratherapeutic on admission, likely secondary to liver failure. Improved.    Anemia- (present on admission)  Assessment & Plan  Comfort Care    Advance care planning- (present on admission)  Assessment & Plan  CODE STATUS was obtained. Patient is DNR/DNI. On comfort care.    Thrombocytopenia (HCC)- (present on admission)  Assessment & Plan  Likely secondary to liver cirrhosis.  On comfort care, awaiting placement for hospice.    Severe protein-calorie malnutrition (HCC)- (present on admission)  Assessment & Plan  On comfort care    Dilated cardiomyopathy (HCC)- (present on admission)  Assessment & Plan  Secondary to substance abuse.  Echocardiogram from 4/11/2022 showed LVEF of ~ 15-20%. On comfort Care    Methamphetamine abuse (HCC)- (present on admission)  Assessment & Plan  On comfort care    Liver cirrhosis (HCC)- (present on admission)  Assessment & Plan  Latest meld score was about 40.  Comfort care         VTE prophylaxis: Comfort Care    I have performed a physical exam and reviewed and updated ROS and Plan today (5/31/2022). In review of yesterday's note (5/30/2022), there are no changes except as documented above.

## 2022-06-01 PROCEDURE — 99231 SBSQ HOSP IP/OBS SF/LOW 25: CPT | Performed by: INTERNAL MEDICINE

## 2022-06-01 PROCEDURE — 700102 HCHG RX REV CODE 250 W/ 637 OVERRIDE(OP): Performed by: INTERNAL MEDICINE

## 2022-06-01 PROCEDURE — A9270 NON-COVERED ITEM OR SERVICE: HCPCS | Performed by: INTERNAL MEDICINE

## 2022-06-01 PROCEDURE — 770004 HCHG ROOM/CARE - ONCOLOGY PRIVATE *

## 2022-06-01 RX ADMIN — SCOPALAMINE 1 PATCH: 1 PATCH, EXTENDED RELEASE TRANSDERMAL at 14:09

## 2022-06-01 ASSESSMENT — PAIN DESCRIPTION - PAIN TYPE
TYPE: ACUTE PAIN
TYPE: ACUTE PAIN

## 2022-06-01 NOTE — WOUND TEAM
Received wound consult for sacrum. Patient refused assessment. Barrier paste left in room and RN updated. Wound consult completed, please re-consult if area worsens or fails to progress.

## 2022-06-01 NOTE — CARE PLAN
The patient is Watcher - Medium risk of patient condition declining or worsening    Shift Goals  Clinical Goals: pain control, safety, comfort  Patient Goals: rest  Family Goals: na    Progress made toward(s) clinical / shift goals:      Problem: Knowledge Deficit - Comfort Care  Goal: Patient and family/care givers will demonstrate understanding of dying process and grieving  Outcome: Progressing  Note: A/Ox4, pt is able to understand plan of care. Pt understands she will d/c with hospice. All questions answered at the moment.       Problem: Fall Risk  Goal: Patient will remain free from falls  Outcome: Progressing  Note: Fall precautions in place, bed in lowest position, bed alarm on, call light and belongings in reach.   Pt calls appropriately.         Patient is not progressing towards the following goals:

## 2022-06-01 NOTE — PROGRESS NOTES
Hospital Medicine Daily Progress Note    Date of Service  6/1/2022    Chief Complaint  Maral Herrera is a 56 y.o. female admitted 5/14/2022 with 3-day history of severe epistaxis.  She has a known brain tumor, substance abuse (methamphetamine, alcohol, marijuana), severe dilated cardiomyopathy with echocardiogram on 4/11/2022 showing LVEF of about 15-20% (resumed to be due to methamphetamine use), cirrhosis (reportedly has TIPS in place).  She resides in a nursing home and was on Xarelto for DVT prophylaxis.  Patient underwent surgery for her brain tumor, procedure was aborted due to severe bleeding.     Hospital Course  On admission, patient was febrile with a UTI.  Blood and urine cultures from 5/15/2022 grew ESBL E. Coli.  Patient developed septic shock which resolved after antibiotics and fluid resuscitation.  She completed her course of antibiotics with meropenem.  She has been thrombocytopenic since admission.     Due to the patient's multiple underlying conditions including brain tumor, severe cardiomyopathy with LVEF of 15%, cirrhosis (meld score of about 40), resulting in guarded prognosis, patient decided to pursue hospice with transition to comfort care.     Interval Problem Update  Patient was transferred to oncology floor on 5/27/2022.  She is afebrile and hemodynamically stable.  Her pain is controlled.     I have personally seen and examined the patient at bedside. I discussed the plan of care with bedside RN.     Consultants/Specialty  critical care and infectious disease     Code Status  DNAR/DNI     Disposition  Patient is medically cleared for discharge.   Anticipate discharge to to hospice.  I have placed the appropriate orders for post-discharge needs.     Review of Systems  Review of Systems   Constitutional: Positive for malaise/fatigue.   HENT: Negative.    Eyes: Negative.    Respiratory: Negative.    Cardiovascular: Negative.    Gastrointestinal: Negative.    Genitourinary: Negative.     Musculoskeletal: Negative.    Skin: Negative.    Neurological: Negative.    Endo/Heme/Allergies: Negative.    Psychiatric/Behavioral: Negative.         Physical Exam  Temp:  [36.7 °C (98 °F)-37.9 °C (100.3 °F)] 36.7 °C (98.1 °F)  Pulse:  [60-64] 64  Resp:  [16-18] 18  BP: ()/(56-60) 100/57  SpO2:  [91 %-98 %] 91 %    Physical Exam  Constitutional:       Appearance: She is ill-appearing.      Comments: cachectic   HENT:      Head: Normocephalic.      Nose: Nose normal.      Mouth/Throat:      Mouth: Mucous membranes are dry.   Eyes:      Pupils: Pupils are equal, round, and reactive to light.   Cardiovascular:      Rate and Rhythm: Normal rate.   Pulmonary:      Effort: Pulmonary effort is normal.   Abdominal:      Palpations: Abdomen is soft.   Musculoskeletal:      Cervical back: Normal range of motion.      Right lower leg: No edema.      Left lower leg: No edema.   Skin:     General: Skin is warm.      Coloration: Skin is jaundiced.   Neurological:      Mental Status: She is alert. Mental status is at baseline.   Psychiatric:         Mood and Affect: Mood normal.     Fluids  No intake or output data in the 24 hours ending 06/01/22 1532    Laboratory                        Imaging  DX-CHEST-PORTABLE (1 VIEW)   Final Result      No radiographic evidence of acute pulmonary tuberculosis.      DX-CHEST-PORTABLE (1 VIEW)   Final Result         1.  No acute cardiopulmonary disease.   2.  Cardiomegaly   3.  Atherosclerosis           Assessment/Plan  * Epistaxis- (present on admission)  Assessment & Plan  Resolved.  INR was elevated on admission.  On comfort care    Bacteremia  Assessment & Plan  Completed antibiotic course for ESBL E.Coli bacteremia. 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 Awaiting Hospice arrangements    Acute cystitis without hematuria  Assessment & Plan  Completed antibiotic course    Hyponatremia- (present on  admission)  Assessment & Plan  Secondary to cirrhosis.  On comfort care     Septic shock (HCC)  Assessment & Plan  Resolved.  Secondary to E. coli ESBL bacteremia and UTI.  Now on comfort care, pending placement for hospice    Supratherapeutic INR- (present on admission)  Assessment & Plan  INR was supratherapeutic on admission, likely secondary to liver failure. Improved.    Anemia- (present on admission)  Assessment & Plan  Comfort Care    Advance care planning- (present on admission)  Assessment & Plan  CODE STATUS was obtained. Patient is DNR/DNI. On comfort care.    Thrombocytopenia (HCC)- (present on admission)  Assessment & Plan  Likely secondary to liver cirrhosis.  On comfort care, awaiting placement for hospice.    Severe protein-calorie malnutrition (HCC)- (present on admission)  Assessment & Plan  On comfort care    Dilated cardiomyopathy (HCC)- (present on admission)  Assessment & Plan  Secondary to substance abuse.  Echocardiogram from 4/11/2022 showed LVEF of ~ 15-20%. On comfort Care    Methamphetamine abuse (HCC)- (present on admission)  Assessment & Plan  On comfort care    Liver cirrhosis (HCC)- (present on admission)  Assessment & Plan  Latest meld score was about 40.  Comfort care         VTE prophylaxis: Comfort Care    I have performed a physical exam and reviewed and updated ROS and Plan today (6/1/2022). In review of yesterday's note (5/31/2022), there are no changes except as documented above.

## 2022-06-01 NOTE — CARE PLAN
The patient is Watcher - Medium risk of patient condition declining or worsening    Shift Goals  Clinical Goals: Comfort / Safety / Discharge planning  Patient Goals: BM Rest  Family Goals: na    Progress made toward(s) clinical / shift goals:    Problem: Knowledge Deficit - Standard  Goal: Patient and family/care givers will demonstrate understanding of plan of care, disease process/condition, diagnostic tests and medications  Outcome: Progressing     Problem: Skin Integrity  Goal: Skin integrity is maintained or improved  Outcome: Progressing     Problem: Fall Risk  Goal: Patient will remain free from falls  Outcome: Progressing     Problem: Mobility  Goal: Patient's capacity to carry out activities will improve  Outcome: Progressing       Patient is not progressing towards the following goals:

## 2022-06-02 PROCEDURE — 700102 HCHG RX REV CODE 250 W/ 637 OVERRIDE(OP)

## 2022-06-02 PROCEDURE — A9270 NON-COVERED ITEM OR SERVICE: HCPCS

## 2022-06-02 PROCEDURE — A9270 NON-COVERED ITEM OR SERVICE: HCPCS | Performed by: STUDENT IN AN ORGANIZED HEALTH CARE EDUCATION/TRAINING PROGRAM

## 2022-06-02 PROCEDURE — 99231 SBSQ HOSP IP/OBS SF/LOW 25: CPT | Performed by: INTERNAL MEDICINE

## 2022-06-02 PROCEDURE — 770004 HCHG ROOM/CARE - ONCOLOGY PRIVATE *

## 2022-06-02 PROCEDURE — 700102 HCHG RX REV CODE 250 W/ 637 OVERRIDE(OP): Performed by: STUDENT IN AN ORGANIZED HEALTH CARE EDUCATION/TRAINING PROGRAM

## 2022-06-02 RX ORDER — HYDROMORPHONE HYDROCHLORIDE 1 MG/ML
0.25 INJECTION, SOLUTION INTRAMUSCULAR; INTRAVENOUS; SUBCUTANEOUS
Status: DISCONTINUED | OUTPATIENT
Start: 2022-06-02 | End: 2022-06-17 | Stop reason: HOSPADM

## 2022-06-02 RX ORDER — LORAZEPAM 0.5 MG/1
0.5 TABLET ORAL EVERY 4 HOURS PRN
Status: DISCONTINUED | OUTPATIENT
Start: 2022-06-02 | End: 2022-06-17 | Stop reason: HOSPADM

## 2022-06-02 RX ORDER — OXYCODONE HYDROCHLORIDE 5 MG/1
5 TABLET ORAL
Status: DISCONTINUED | OUTPATIENT
Start: 2022-06-02 | End: 2022-06-17 | Stop reason: HOSPADM

## 2022-06-02 RX ORDER — OXYCODONE HYDROCHLORIDE 10 MG/1
10 TABLET ORAL
Status: DISCONTINUED | OUTPATIENT
Start: 2022-06-02 | End: 2022-06-17 | Stop reason: HOSPADM

## 2022-06-02 RX ORDER — OXYCODONE HYDROCHLORIDE 5 MG/1
5 TABLET ORAL ONCE
Status: COMPLETED | OUTPATIENT
Start: 2022-06-02 | End: 2022-06-02

## 2022-06-02 RX ADMIN — OXYCODONE 5 MG: 5 TABLET ORAL at 16:25

## 2022-06-02 RX ADMIN — OXYCODONE 5 MG: 5 TABLET ORAL at 18:43

## 2022-06-02 RX ADMIN — ATORVASTATIN CALCIUM 20 MG: 20 TABLET, FILM COATED ORAL at 16:25

## 2022-06-02 ASSESSMENT — PAIN DESCRIPTION - PAIN TYPE: TYPE: ACUTE PAIN

## 2022-06-02 NOTE — PROGRESS NOTES
Hospital Medicine Daily Progress Note    Date of Service  6/2/2022    Chief Complaint  Maral Herrera is a 56 y.o. female admitted 5/14/2022 with 3-day history of severe epistaxis.  She has a known brain tumor, substance abuse (methamphetamine, alcohol, marijuana), severe dilated cardiomyopathy with echocardiogram on 4/11/2022 showing LVEF of about 15-20% (resumed to be due to methamphetamine use), cirrhosis (reportedly has TIPS in place).  She resides in a nursing home and was on Xarelto for DVT prophylaxis.  Patient underwent surgery for her brain tumor, procedure was aborted due to severe bleeding.     Hospital Course  On admission, patient was febrile with a UTI.  Blood and urine cultures from 5/15/2022 grew ESBL E. Coli.  Patient developed septic shock which resolved after antibiotics and fluid resuscitation.  She completed her course of antibiotics with meropenem.  She has been thrombocytopenic since admission.     Due to the patient's multiple underlying conditions including brain tumor, severe cardiomyopathy with LVEF of 15%, cirrhosis (meld score of about 40), resulting in guarded prognosis, patient decided to pursue hospice with transition to comfort care.     Interval Problem Update  Patient was transferred to oncology floor on 5/27/2022.  She is afebrile and hemodynamically stable.  Her pain is controlled.     I have personally seen and examined the patient at bedside. I discussed the plan of care with bedside RN.     Consultants/Specialty  critical care and infectious disease     Code Status  DNAR/DNI     Disposition  Patient is medically cleared for discharge.   Anticipate discharge to to hospice.  I have placed the appropriate orders for post-discharge needs.     Review of Systems  Review of Systems   Constitutional: Positive for malaise/fatigue.   HENT: Negative.    Eyes: Negative.    Respiratory: Negative.    Cardiovascular: Negative.    Gastrointestinal: Negative.    Genitourinary: Negative.     Musculoskeletal: Negative.    Skin: Negative.    Neurological: Negative.    Endo/Heme/Allergies: Negative.    Psychiatric/Behavioral: Negative.         Physical Exam  Temp:  [36.2 °C (97.1 °F)-36.9 °C (98.4 °F)] 36.2 °C (97.1 °F)  Pulse:  [57-72] 57  Resp:  [16-18] 18  BP: (100-114)/(59-71) 114/71  SpO2:  [91 %-96 %] 91 %      Physical Exam  Constitutional:       Appearance: She is ill-appearing.      Comments: cachectic   HENT:      Head: Normocephalic.      Nose: Nose normal.      Mouth/Throat:      Mouth: Mucous membranes are dry.   Eyes:      Pupils: Pupils are equal, round, and reactive to light.   Cardiovascular:      Rate and Rhythm: Normal rate.   Pulmonary:      Effort: Pulmonary effort is normal.   Abdominal:      Palpations: Abdomen is soft.   Musculoskeletal:      Cervical back: Normal range of motion.      Right lower leg: No edema.      Left lower leg: No edema.   Skin:     General: Skin is warm.      Coloration: Skin is jaundiced.   Neurological:      Mental Status: She is alert. Mental status is at baseline.   Psychiatric:         Mood and Affect: Mood normal.     Fluids    Intake/Output Summary (Last 24 hours) at 6/2/2022 1053  Last data filed at 6/1/2022 1800  Gross per 24 hour   Intake 200 ml   Output --   Net 200 ml       Laboratory                        Imaging  DX-CHEST-PORTABLE (1 VIEW)   Final Result      No radiographic evidence of acute pulmonary tuberculosis.      DX-CHEST-PORTABLE (1 VIEW)   Final Result         1.  No acute cardiopulmonary disease.   2.  Cardiomegaly   3.  Atherosclerosis           Assessment/Plan  * Epistaxis- (present on admission)  Assessment & Plan  Resolved.  INR was elevated on admission.  On comfort care    Bacteremia  Assessment & Plan  Completed antibiotic course for ESBL E.Coli bacteremia. 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 Awaiting Hospice arrangements    Acute cystitis without  hematuria  Assessment & Plan  Completed antibiotic course    Hyponatremia- (present on admission)  Assessment & Plan  Secondary to cirrhosis.  On comfort care     Septic shock (HCC)  Assessment & Plan  Resolved.  Secondary to E. coli ESBL bacteremia and UTI.  Now on comfort care, pending placement for hospice    Supratherapeutic INR- (present on admission)  Assessment & Plan  INR was supratherapeutic on admission, likely secondary to liver failure. Improved.    Anemia- (present on admission)  Assessment & Plan  Comfort Care    Advance care planning- (present on admission)  Assessment & Plan  CODE STATUS was obtained. Patient is DNR/DNI. On comfort care.    Thrombocytopenia (HCC)- (present on admission)  Assessment & Plan  Likely secondary to liver cirrhosis.  On comfort care, awaiting placement for hospice.    Severe protein-calorie malnutrition (HCC)- (present on admission)  Assessment & Plan  On comfort care    Dilated cardiomyopathy (HCC)- (present on admission)  Assessment & Plan  Secondary to substance abuse.  Echocardiogram from 4/11/2022 showed LVEF of ~ 15-20%. On comfort Care    Methamphetamine abuse (HCC)- (present on admission)  Assessment & Plan  On comfort care    Liver cirrhosis (HCC)- (present on admission)  Assessment & Plan  Latest meld score was about 40.  Comfort care         VTE prophylaxis: Comfort Care    I have performed a physical exam and reviewed and updated ROS and Plan today (6/2/2022). In review of yesterday's note (6/1/2022), there are no changes except as documented above.

## 2022-06-02 NOTE — CARE PLAN
The patient is Watcher - Medium risk of patient condition declining or worsening    Shift Goals  Clinical Goals: comfort, safety, emotional support  Patient Goals: BM Rest  Family Goals: na    Progress made toward(s) clinical / shift goals:      Problem: Fall Risk  Goal: Patient will remain free from falls  Outcome: Progressing  Note: Fall precautions in place, bed in lowest position, call light and belongings in reach.   Pt calls appropriately.       Problem: Knowledge Deficit - Comfort Care  Goal: Patient and family/care givers will demonstrate understanding of dying process and grieving  Outcome: Progressing  Note: A/Ox4, pt is able to understand plan of care.  All questions answered at the moment.       Patient is not progressing towards the following goals:

## 2022-06-02 NOTE — DISCHARGE PLANNING
Case Management Discharge Planning    Admission Date: 5/14/2022  GMLOS: 4.8  ALOS: 19    6-Clicks ADL Score: 18  6-Clicks Mobility Score: 14  PT and/or OT Eval ordered: Yes   Post-acute Referrals Ordered: Yes  Post-acute Choice Obtained: Yes  Has referral(s) been sent to post-acute provider:  Yes      Anticipated Discharge Dispo: Discharge Disposition: D/T to hospice home (50)    DME Needed: No    Action(s) Taken: Updated Provider/Nurse on Discharge Plan    Escalations Completed: Leadership    Medically Clear: Yes    Next Steps:CM follow with [facility] states Per Elicia [phone] will possible have a bed this weekend, she has a client moving out on Friday.Patient was denied group home with Bill  from [facility]. If patient cant go home with [facility]. CM will try [facility]  Owner Keyonna Jaydavidmando [phone]. CM will continue to follow and assist with discharge needs     Barriers to Discharge: Pending Placement    Is the patient up for discharge tomorrow: No

## 2022-06-02 NOTE — CARE PLAN
The patient is Watcher - Medium risk of patient condition declining or worsening    Shift Goals  Clinical Goals: comfort, safety, emotional support  Patient Goals: BM Rest  Family Goals: na    Progress made toward(s) clinical / shift goals:    Problem: Skin Integrity  Goal: Skin integrity is maintained or improved  Outcome: Progressing     Problem: Fall Risk  Goal: Patient will remain free from falls  Outcome: Progressing       Patient is not progressing towards the following goals:

## 2022-06-03 PROCEDURE — 700102 HCHG RX REV CODE 250 W/ 637 OVERRIDE(OP)

## 2022-06-03 PROCEDURE — 700102 HCHG RX REV CODE 250 W/ 637 OVERRIDE(OP): Performed by: STUDENT IN AN ORGANIZED HEALTH CARE EDUCATION/TRAINING PROGRAM

## 2022-06-03 PROCEDURE — A9270 NON-COVERED ITEM OR SERVICE: HCPCS

## 2022-06-03 PROCEDURE — A9270 NON-COVERED ITEM OR SERVICE: HCPCS | Performed by: STUDENT IN AN ORGANIZED HEALTH CARE EDUCATION/TRAINING PROGRAM

## 2022-06-03 PROCEDURE — 99231 SBSQ HOSP IP/OBS SF/LOW 25: CPT | Performed by: INTERNAL MEDICINE

## 2022-06-03 PROCEDURE — 770004 HCHG ROOM/CARE - ONCOLOGY PRIVATE *

## 2022-06-03 RX ADMIN — FUROSEMIDE 20 MG: 20 TABLET ORAL at 05:48

## 2022-06-03 RX ADMIN — METOPROLOL SUCCINATE 25 MG: 25 TABLET, EXTENDED RELEASE ORAL at 05:47

## 2022-06-03 RX ADMIN — OXYCODONE HYDROCHLORIDE 10 MG: 10 TABLET ORAL at 15:05

## 2022-06-03 RX ADMIN — ATORVASTATIN CALCIUM 20 MG: 20 TABLET, FILM COATED ORAL at 17:44

## 2022-06-03 RX ADMIN — OXYCODONE HYDROCHLORIDE 10 MG: 10 TABLET ORAL at 21:35

## 2022-06-03 ASSESSMENT — PAIN DESCRIPTION - PAIN TYPE
TYPE: ACUTE PAIN
TYPE: ACUTE PAIN

## 2022-06-03 NOTE — CARE PLAN
The patient is Stable - Low risk of patient condition declining or worsening    Shift Goals  Clinical Goals: pain management, safety  Patient Goals: rest  Family Goals: MORALES    Progress made toward(s) clinical / shift goals:    Problem: Knowledge Deficit - Standard  Goal: Patient and family/care givers will demonstrate understanding of plan of care, disease process/condition, diagnostic tests and medications  Outcome: Progressing     Problem: Pain - Standard  Goal: Alleviation of pain or a reduction in pain to the patient’s comfort goal  Outcome: Progressing     Problem: Skin Integrity  Goal: Skin integrity is maintained or improved  Outcome: Progressing     Problem: Fall Risk  Goal: Patient will remain free from falls  Outcome: Progressing

## 2022-06-03 NOTE — PROGRESS NOTES
Hospital Medicine Daily Progress Note    Date of Service  6/3/2022    Chief Complaint  Maral Herrera is a 56 y.o. female admitted 5/14/2022 with 3-day history of severe epistaxis.  She has a known brain tumor, substance abuse (methamphetamine, alcohol, marijuana), severe dilated cardiomyopathy with echocardiogram on 4/11/2022 showing LVEF of about 15-20% (resumed to be due to methamphetamine use), cirrhosis (reportedly has TIPS in place).  She resides in a nursing home and was on Xarelto for DVT prophylaxis.  Patient underwent surgery for her brain tumor, procedure was aborted due to severe bleeding.     Hospital Course  On admission, patient was febrile with a UTI.  Blood and urine cultures from 5/15/2022 grew ESBL E. Coli.  Patient developed septic shock which resolved after antibiotics and fluid resuscitation.  She completed her course of antibiotics with meropenem.  She has been thrombocytopenic since admission.     Due to the patient's multiple underlying conditions including brain tumor, severe cardiomyopathy with LVEF of 15%, cirrhosis (meld score of about 40), resulting in guarded prognosis, patient decided to pursue hospice with transition to comfort care.     Interval Problem Update  Patient was transferred to oncology floor on 5/27/2022.  She is afebrile and hemodynamically stable.  Her pain is controlled.     I have personally seen and examined the patient at bedside. I discussed the plan of care with bedside RN.     Consultants/Specialty  critical care and infectious disease     Code Status  DNAR/DNI     Disposition  Patient is medically cleared for discharge.   Anticipate discharge to to hospice.  I have placed the appropriate orders for post-discharge needs.     Review of Systems  Review of Systems   Constitutional: Positive for malaise/fatigue.   HENT: Negative.    Eyes: Negative.    Respiratory: Negative.    Cardiovascular: Negative.    Gastrointestinal: Negative.    Genitourinary: Negative.     Musculoskeletal: Negative.    Skin: Negative.    Neurological: Negative.    Endo/Heme/Allergies: Negative.    Psychiatric/Behavioral: Negative.         Physical Exam  Temp:  [36.3 °C (97.4 °F)-37.2 °C (98.9 °F)] 36.3 °C (97.4 °F)  Pulse:  [62-90] 62  Resp:  [16-18] 18  BP: (100-115)/(55-69) 103/63  SpO2:  [91 %-98 %] 98 %      Physical Exam  Constitutional:       Appearance: She is ill-appearing.      Comments: cachectic   HENT:      Head: Normocephalic.      Nose: Nose normal.      Mouth/Throat:      Mouth: Mucous membranes are dry.   Eyes:      Pupils: Pupils are equal, round, and reactive to light.   Cardiovascular:      Rate and Rhythm: Normal rate.   Pulmonary:      Effort: Pulmonary effort is normal.   Abdominal:      Palpations: Abdomen is soft.   Musculoskeletal:      Cervical back: Normal range of motion.      Right lower leg: No edema.      Left lower leg: No edema.   Skin:     General: Skin is warm.      Coloration: Skin is jaundiced.   Neurological:      Mental Status: She is alert. Mental status is at baseline.   Psychiatric:         Mood and Affect: Mood normal.       Fluids  No intake or output data in the 24 hours ending 06/03/22 1004    Laboratory                        Imaging  DX-CHEST-PORTABLE (1 VIEW)   Final Result      No radiographic evidence of acute pulmonary tuberculosis.      DX-CHEST-PORTABLE (1 VIEW)   Final Result         1.  No acute cardiopulmonary disease.   2.  Cardiomegaly   3.  Atherosclerosis           Assessment/Plan  * Epistaxis- (present on admission)  Assessment & Plan  Resolved.  INR was elevated on admission.  On comfort care    Bacteremia  Assessment & Plan  Completed antibiotic course for ESBL E.Coli bacteremia. 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 Awaiting Hospice arrangements    Acute cystitis without hematuria  Assessment & Plan  Completed antibiotic course    Hyponatremia- (present on  admission)  Assessment & Plan  Secondary to cirrhosis.  On comfort care     Septic shock (HCC)  Assessment & Plan  Resolved.  Secondary to E. coli ESBL bacteremia and UTI.  Now on comfort care, pending placement for hospice    Supratherapeutic INR- (present on admission)  Assessment & Plan  INR was supratherapeutic on admission, likely secondary to liver failure. Improved.    Anemia- (present on admission)  Assessment & Plan  Comfort Care    Advance care planning- (present on admission)  Assessment & Plan  CODE STATUS was obtained. Patient is DNR/DNI. On comfort care.    Thrombocytopenia (HCC)- (present on admission)  Assessment & Plan  Likely secondary to liver cirrhosis.  On comfort care, awaiting placement for hospice.    Severe protein-calorie malnutrition (HCC)- (present on admission)  Assessment & Plan  On comfort care    Dilated cardiomyopathy (HCC)- (present on admission)  Assessment & Plan  Secondary to substance abuse.  Echocardiogram from 4/11/2022 showed LVEF of ~ 15-20%. On comfort Care    Methamphetamine abuse (HCC)- (present on admission)  Assessment & Plan  On comfort care    Liver cirrhosis (HCC)- (present on admission)  Assessment & Plan  Latest meld score was about 40.  Comfort care         VTE prophylaxis: Comfort Care    I have performed a physical exam and reviewed and updated ROS and Plan today (6/3/2022). In review of yesterday's note (6/2/2022), there are no changes except as documented above.

## 2022-06-03 NOTE — PROGRESS NOTES
I certify that the patient requires continued medically necessary hospital services until she has been placed in Hospice for brain tumor, severe cardiomyopathy (LVVEF ~ 15-20%). Discharge plan is anticipated to be within next few days as soon as Hospice arrangements have been made

## 2022-06-03 NOTE — CARE PLAN
The patient is Stable - Low risk of patient condition declining or worsening    Shift Goals  Clinical Goals: Comfort  Patient Goals: Rest  Family Goals: MORALES    Progress made toward(s) clinical / shift goals:    Problem: Knowledge Deficit - Standard  Goal: Patient and family/care givers will demonstrate understanding of plan of care, disease process/condition, diagnostic tests and medications  Outcome: Progressing     Problem: Pain - Standard  Goal: Alleviation of pain or a reduction in pain to the patient’s comfort goal  Outcome: Progressing     Problem: Skin Integrity  Goal: Skin integrity is maintained or improved  Outcome: Progressing     Problem: Fall Risk  Goal: Patient will remain free from falls  Outcome: Progressing     Problem: Discharge Barriers/Planning  Goal: Patient's continuum of care needs are met  Outcome: Progressing     Problem: Mobility  Goal: Patient's capacity to carry out activities will improve  Outcome: Progressing     Problem: Self Care  Goal: Patient will have the ability to perform ADLs independently or with assistance (bathe, groom, dress, toilet and feed)  Outcome: Progressing     Problem: Knowledge Deficit - Comfort Care  Goal: Patient and family/care givers will demonstrate understanding of dying process and grieving  Outcome: Progressing

## 2022-06-04 PROCEDURE — 700102 HCHG RX REV CODE 250 W/ 637 OVERRIDE(OP): Performed by: STUDENT IN AN ORGANIZED HEALTH CARE EDUCATION/TRAINING PROGRAM

## 2022-06-04 PROCEDURE — 700102 HCHG RX REV CODE 250 W/ 637 OVERRIDE(OP)

## 2022-06-04 PROCEDURE — 770004 HCHG ROOM/CARE - ONCOLOGY PRIVATE *

## 2022-06-04 PROCEDURE — A9270 NON-COVERED ITEM OR SERVICE: HCPCS | Performed by: STUDENT IN AN ORGANIZED HEALTH CARE EDUCATION/TRAINING PROGRAM

## 2022-06-04 PROCEDURE — 700102 HCHG RX REV CODE 250 W/ 637 OVERRIDE(OP): Performed by: INTERNAL MEDICINE

## 2022-06-04 PROCEDURE — A9270 NON-COVERED ITEM OR SERVICE: HCPCS

## 2022-06-04 PROCEDURE — 99231 SBSQ HOSP IP/OBS SF/LOW 25: CPT | Performed by: INTERNAL MEDICINE

## 2022-06-04 PROCEDURE — A9270 NON-COVERED ITEM OR SERVICE: HCPCS | Performed by: INTERNAL MEDICINE

## 2022-06-04 RX ADMIN — FUROSEMIDE 20 MG: 20 TABLET ORAL at 05:04

## 2022-06-04 RX ADMIN — OXYCODONE HYDROCHLORIDE 10 MG: 10 TABLET ORAL at 05:07

## 2022-06-04 RX ADMIN — ATORVASTATIN CALCIUM 20 MG: 20 TABLET, FILM COATED ORAL at 16:26

## 2022-06-04 RX ADMIN — OXYCODONE 5 MG: 5 TABLET ORAL at 21:29

## 2022-06-04 RX ADMIN — SCOPALAMINE 1 PATCH: 1 PATCH, EXTENDED RELEASE TRANSDERMAL at 12:30

## 2022-06-04 RX ADMIN — OXYCODONE HYDROCHLORIDE 10 MG: 10 TABLET ORAL at 16:26

## 2022-06-04 RX ADMIN — OXYCODONE HYDROCHLORIDE 10 MG: 10 TABLET ORAL at 12:30

## 2022-06-04 ASSESSMENT — PAIN DESCRIPTION - PAIN TYPE
TYPE: ACUTE PAIN
TYPE: ACUTE PAIN

## 2022-06-04 NOTE — CARE PLAN
Problem: Knowledge Deficit - Standard  Goal: Patient and family/care givers will demonstrate understanding of plan of care, disease process/condition, diagnostic tests and medications  Outcome: Progressing     Problem: Pain - Standard  Goal: Alleviation of pain or a reduction in pain to the patient’s comfort goal  Outcome: Progressing     Problem: Skin Integrity  Goal: Skin integrity is maintained or improved  Outcome: Progressing     Problem: Fall Risk  Goal: Patient will remain free from falls  Outcome: Progressing   The patient is Stable - Low risk of patient condition declining or worsening    Shift Goals  Clinical Goals: Comfort  Patient Goals: Rest  Family Goals: MORALES    Progress made toward(s) clinical / shift goals:  Pt educated about plan of care for tonight, pain managed with PRN medications, skin precautions in place, bed alarm in place and fall education provided    Patient is not progressing towards the following goals:

## 2022-06-04 NOTE — CARE PLAN
The patient is Stable - Low risk of patient condition declining or worsening    Shift Goals  Clinical Goals: Comfort  Patient Goals: Rest and food  Family Goals: MORALES    Progress made toward(s) clinical / shift goals: Provide prn pain medications as needed per MAR for comfort. Provide food and drinks when patient requests.     Problem: Knowledge Deficit - Standard  Goal: Patient and family/care givers will demonstrate understanding of plan of care, disease process/condition, diagnostic tests and medications  Outcome: Progressing     Problem: Pain - Standard  Goal: Alleviation of pain or a reduction in pain to the patient’s comfort goal  Outcome: Progressing     Problem: Skin Integrity  Goal: Skin integrity is maintained or improved  Outcome: Progressing     Problem: Fall Risk  Goal: Patient will remain free from falls  Outcome: Progressing     Problem: Discharge Barriers/Planning  Goal: Patient's continuum of care needs are met  Outcome: Progressing     Problem: Mobility  Goal: Patient's capacity to carry out activities will improve  Outcome: Progressing     Problem: Self Care  Goal: Patient will have the ability to perform ADLs independently or with assistance (bathe, groom, dress, toilet and feed)  Outcome: Progressing     Problem: Knowledge Deficit - Comfort Care  Goal: Patient and family/care givers will demonstrate understanding of dying process and grieving  Outcome: Progressing

## 2022-06-04 NOTE — PROGRESS NOTES
Hospital Medicine Daily Progress Note    Date of Service  6/4/2022    Chief Complaint  Maral Herrera is a 56 y.o. female admitted 5/14/2022 with 3-day history of severe epistaxis.  She has a known brain tumor, substance abuse (methamphetamine, alcohol, marijuana), severe dilated cardiomyopathy with echocardiogram on 4/11/2022 showing LVEF of about 15-20% (resumed to be due to methamphetamine use), cirrhosis (reportedly has TIPS in place).  She resides in a nursing home and was on Xarelto for DVT prophylaxis.  Patient underwent surgery for her brain tumor, procedure was aborted due to severe bleeding.     Hospital Course  On admission, patient was febrile with a UTI.  Blood and urine cultures from 5/15/2022 grew ESBL E. Coli.  Patient developed septic shock which resolved after antibiotics and fluid resuscitation.  She completed her course of antibiotics with meropenem.  She has been thrombocytopenic since admission.     Due to the patient's multiple underlying conditions including brain tumor, severe cardiomyopathy with LVEF of 15%, cirrhosis (meld score of about 40), resulting in guarded prognosis, patient decided to pursue hospice with transition to comfort care.     Interval Problem Update  Patient was transferred to oncology floor on 5/27/2022.  She is afebrile and hemodynamically stable.  Her pain is controlled.     I have personally seen and examined the patient at bedside. I discussed the plan of care with bedside RN.     Consultants/Specialty  critical care and infectious disease     Code Status  DNAR/DNI     Disposition  Patient is medically cleared for discharge.   Anticipate discharge to to hospice.  I have placed the appropriate orders for post-discharge needs.     Review of Systems  Review of Systems   Constitutional: Positive for malaise/fatigue.   HENT: Negative.    Eyes: Negative.    Respiratory: Negative.    Cardiovascular: Negative.    Gastrointestinal: Negative.    Genitourinary: Negative.     Musculoskeletal: Negative.    Skin: Negative.    Neurological: Negative.    Endo/Heme/Allergies: Negative.    Psychiatric/Behavioral: Negative.         Physical Exam  Temp:  [36.6 °C (97.9 °F)-37.1 °C (98.7 °F)] 37.1 °C (98.7 °F)  Pulse:  [56-62] 60  Resp:  [16-18] 16  BP: (100-103)/(60-65) 100/65  SpO2:  [93 %-96 %] 93 %    Physical Exam  Constitutional:       Appearance: She is ill-appearing.      Comments: cachectic   HENT:      Head: Normocephalic.      Nose: Nose normal.      Mouth/Throat:      Mouth: Mucous membranes are dry.   Eyes:      Pupils: Pupils are equal, round, and reactive to light.   Cardiovascular:      Rate and Rhythm: Normal rate.   Pulmonary:      Effort: Pulmonary effort is normal.   Abdominal:      Palpations: Abdomen is soft.   Musculoskeletal:      Cervical back: Normal range of motion.      Right lower leg: No edema.      Left lower leg: No edema.   Skin:     General: Skin is warm.      Coloration: Skin is jaundiced.   Neurological:      Mental Status: She is alert. Mental status is at baseline.   Psychiatric:         Mood and Affect: Mood normal.       Fluids    Intake/Output Summary (Last 24 hours) at 6/4/2022 1124  Last data filed at 6/4/2022 0900  Gross per 24 hour   Intake 480 ml   Output --   Net 480 ml       Laboratory                        Imaging  DX-CHEST-PORTABLE (1 VIEW)   Final Result      No radiographic evidence of acute pulmonary tuberculosis.      DX-CHEST-PORTABLE (1 VIEW)   Final Result         1.  No acute cardiopulmonary disease.   2.  Cardiomegaly   3.  Atherosclerosis           Assessment/Plan  * Epistaxis- (present on admission)  Assessment & Plan  Resolved.  INR was elevated on admission.  On comfort care    Bacteremia  Assessment & Plan  Completed antibiotic course for ESBL E.Coli bacteremia. 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 Awaiting Hospice arrangements    Acute cystitis without  hematuria  Assessment & Plan  Completed antibiotic course    Hyponatremia- (present on admission)  Assessment & Plan  Secondary to cirrhosis.  On comfort care     Septic shock (HCC)  Assessment & Plan  Resolved.  Secondary to E. coli ESBL bacteremia and UTI.  Now on comfort care, pending placement for hospice    Supratherapeutic INR- (present on admission)  Assessment & Plan  INR was supratherapeutic on admission, likely secondary to liver failure. Improved.    Anemia- (present on admission)  Assessment & Plan  Comfort Care    Advance care planning- (present on admission)  Assessment & Plan  CODE STATUS was obtained. Patient is DNR/DNI. On comfort care.    Thrombocytopenia (HCC)- (present on admission)  Assessment & Plan  Likely secondary to liver cirrhosis.  On comfort care, awaiting placement for hospice.    Severe protein-calorie malnutrition (HCC)- (present on admission)  Assessment & Plan  On comfort care    Dilated cardiomyopathy (HCC)- (present on admission)  Assessment & Plan  Secondary to substance abuse.  Echocardiogram from 4/11/2022 showed LVEF of ~ 15-20%. On comfort Care    Methamphetamine abuse (HCC)- (present on admission)  Assessment & Plan  On comfort care    Liver cirrhosis (HCC)- (present on admission)  Assessment & Plan  Latest meld score was about 40.  Comfort care         VTE prophylaxis: Comfort Care    I have performed a physical exam and reviewed and updated ROS and Plan today (6/4/2022). In review of yesterday's note (6/3/2022), there are no changes except as documented above.

## 2022-06-04 NOTE — PROGRESS NOTES
Received Bedside report. Assumed care at 1900. This pt is AOx4, ambulatory with x1 FWW, voiding adequately, 7/10 pain. Patient and RN discussed plan of care: questions answered. Labs noted, assessment complete, patient tolerating cardiac diet. Pt is on 0L of O2 via RA. Call light in place, fall precautions in place, patient educated on importance of calling for assistance. No additional needs at this time. VSS

## 2022-06-05 PROCEDURE — 99231 SBSQ HOSP IP/OBS SF/LOW 25: CPT | Performed by: INTERNAL MEDICINE

## 2022-06-05 PROCEDURE — 770004 HCHG ROOM/CARE - ONCOLOGY PRIVATE *

## 2022-06-05 PROCEDURE — 700102 HCHG RX REV CODE 250 W/ 637 OVERRIDE(OP): Performed by: INTERNAL MEDICINE

## 2022-06-05 PROCEDURE — A9270 NON-COVERED ITEM OR SERVICE: HCPCS

## 2022-06-05 PROCEDURE — A9270 NON-COVERED ITEM OR SERVICE: HCPCS | Performed by: INTERNAL MEDICINE

## 2022-06-05 PROCEDURE — 700102 HCHG RX REV CODE 250 W/ 637 OVERRIDE(OP)

## 2022-06-05 RX ADMIN — OXYCODONE HYDROCHLORIDE 10 MG: 10 TABLET ORAL at 06:11

## 2022-06-05 RX ADMIN — OXYCODONE HYDROCHLORIDE 10 MG: 10 TABLET ORAL at 08:43

## 2022-06-05 RX ADMIN — OXYCODONE HYDROCHLORIDE 10 MG: 10 TABLET ORAL at 14:10

## 2022-06-05 RX ADMIN — OXYCODONE HYDROCHLORIDE 10 MG: 10 TABLET ORAL at 17:05

## 2022-06-05 RX ADMIN — SCOPALAMINE 1 PATCH: 1 PATCH, EXTENDED RELEASE TRANSDERMAL at 21:05

## 2022-06-05 RX ADMIN — LORAZEPAM 0.5 MG: 0.5 TABLET ORAL at 20:58

## 2022-06-05 NOTE — CARE PLAN
The patient is Stable - Low risk of patient condition declining or worsening    Shift Goals  Clinical Goals: Comfort  Patient Goals: Rest and food  Family Goals: MORALES    Progress made toward(s) clinical / shift goals: Provide prn pain medications as needed per MAR for comfort. Provide food and drinks when patient requests.     Problem: Knowledge Deficit - Standard  Goal: Patient and family/care givers will demonstrate understanding of plan of care, disease process/condition, diagnostic tests and medications  Outcome: Progressing     Problem: Pain - Standard  Goal: Alleviation of pain or a reduction in pain to the patient’s comfort goal  Outcome: Progressing     Problem: Skin Integrity  Goal: Skin integrity is maintained or improved  Outcome: Progressing     Problem: Fall Risk  Goal: Patient will remain free from falls  Outcome: Progressing     Problem: Discharge Barriers/Planning  Goal: Patient's continuum of care needs are met  Outcome: Progressing     Problem: Mobility  Goal: Patient's capacity to carry out activities will improve  Outcome: Progressing     Problem: Self Care  Goal: Patient will have the ability to perform ADLs independently or with assistance (bathe, groom, dress, toilet and feed)  Outcome: Progressing     Problem: Knowledge Deficit - Comfort Care  Goal: Patient and family/care givers will demonstrate understanding of dying process and grieving  Outcome: Progressing           The patient is Stable - Low risk of patient condition declining or worsening    Shift Goals  Clinical Goals: Comfort, pain control  Patient Goals: Comfort, pain control  Family Goals: MORALES    Progress made toward(s) clinical / shift goals:      Patient is not progressing towards the following goals:

## 2022-06-05 NOTE — PROGRESS NOTES
Hospital Medicine Daily Progress Note    Date of Service  6/5/2022    Chief Complaint  Maral Herrera is a 56 y.o. female admitted 5/14/2022 with 3-day history of severe epistaxis.  She has a known brain tumor, substance abuse (methamphetamine, alcohol, marijuana), severe dilated cardiomyopathy with echocardiogram on 4/11/2022 showing LVEF of about 15-20% (resumed to be due to methamphetamine use), cirrhosis (reportedly has TIPS in place).  She resides in a nursing home and was on Xarelto for DVT prophylaxis.  Patient underwent surgery for her brain tumor, procedure was aborted due to severe bleeding.     Hospital Course  On admission, patient was febrile with a UTI.  Blood and urine cultures from 5/15/2022 grew ESBL E. Coli.  Patient developed septic shock which resolved after antibiotics and fluid resuscitation.  She completed her course of antibiotics with meropenem.  She has been thrombocytopenic since admission.     Due to the patient's multiple underlying conditions including brain tumor, severe cardiomyopathy with LVEF of 15%, cirrhosis (meld score of about 40), resulting in guarded prognosis, patient decided to pursue hospice with transition to comfort care.     Interval Problem Update  Patient was transferred to oncology floor on 5/27/2022.  She is afebrile and hemodynamically stable.  Her pain is controlled.     I have personally seen and examined the patient at bedside. I discussed the plan of care with bedside RN.     Consultants/Specialty  critical care and infectious disease     Code Status  DNAR/DNI     Disposition  Patient is medically cleared for discharge.   Anticipate discharge to to hospice.  I have placed the appropriate orders for post-discharge needs.     Review of Systems  Review of Systems   Constitutional: Positive for malaise/fatigue.   HENT: Negative.    Eyes: Negative.    Respiratory: Negative.    Cardiovascular: Negative.    Gastrointestinal: Negative.    Genitourinary: Negative.     Musculoskeletal: Negative.    Skin: Negative.    Neurological: Negative.    Endo/Heme/Allergies: Negative.    Psychiatric/Behavioral: Negative.      Physical Exam  Temp:  [37 °C (98.6 °F)-37.3 °C (99.1 °F)] 37 °C (98.6 °F)  Pulse:  [62-76] 72  Resp:  [16-18] 16  BP: (100-119)/(61-70) 104/67  SpO2:  [91 %] 91 %    Physical Exam  Constitutional:       Appearance: She is ill-appearing.      Comments: cachectic   HENT:      Head: Normocephalic.      Nose: Nose normal.      Mouth/Throat:      Mouth: Mucous membranes are dry.   Eyes:      Pupils: Pupils are equal, round, and reactive to light.   Cardiovascular:      Rate and Rhythm: Normal rate.   Pulmonary:      Effort: Pulmonary effort is normal.   Abdominal:      Palpations: Abdomen is soft.   Musculoskeletal:      Cervical back: Normal range of motion.      Right lower leg: No edema.      Left lower leg: No edema.   Skin:     General: Skin is warm.      Coloration: Skin is jaundiced.   Neurological:      Mental Status: She is alert. Mental status is at baseline.   Psychiatric:         Mood and Affect: Mood normal.     Fluids    Intake/Output Summary (Last 24 hours) at 6/5/2022 0831  Last data filed at 6/4/2022 1745  Gross per 24 hour   Intake 720 ml   Output --   Net 720 ml       Laboratory                        Imaging  DX-CHEST-PORTABLE (1 VIEW)   Final Result      No radiographic evidence of acute pulmonary tuberculosis.      DX-CHEST-PORTABLE (1 VIEW)   Final Result         1.  No acute cardiopulmonary disease.   2.  Cardiomegaly   3.  Atherosclerosis           Assessment/Plan  * Epistaxis- (present on admission)  Assessment & Plan  Resolved.  INR was elevated on admission.  On comfort care    Bacteremia  Assessment & Plan  Completed antibiotic course for ESBL E.Coli bacteremia. 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 Awaiting Hospice arrangements    Acute cystitis without  hematuria  Assessment & Plan  Completed antibiotic course    Hyponatremia- (present on admission)  Assessment & Plan  Secondary to cirrhosis.  On comfort care     Septic shock (HCC)  Assessment & Plan  Resolved.  Secondary to E. coli ESBL bacteremia and UTI.  Now on comfort care, pending placement for hospice    Supratherapeutic INR- (present on admission)  Assessment & Plan  INR was supratherapeutic on admission, likely secondary to liver failure. Improved.    Anemia- (present on admission)  Assessment & Plan  Comfort Care    Advance care planning- (present on admission)  Assessment & Plan  CODE STATUS was obtained. Patient is DNR/DNI. On comfort care.    Thrombocytopenia (HCC)- (present on admission)  Assessment & Plan  Likely secondary to liver cirrhosis.  On comfort care, awaiting placement for hospice.    Severe protein-calorie malnutrition (HCC)- (present on admission)  Assessment & Plan  On comfort care    Dilated cardiomyopathy (HCC)- (present on admission)  Assessment & Plan  Secondary to substance abuse.  Echocardiogram from 4/11/2022 showed LVEF of ~ 15-20%. On comfort Care    Methamphetamine abuse (HCC)- (present on admission)  Assessment & Plan  On comfort care    Liver cirrhosis (HCC)- (present on admission)  Assessment & Plan  Latest meld score was about 40.  Comfort care         VTE prophylaxis: Comfort Care    I have performed a physical exam and reviewed and updated ROS and Plan today (6/5/2022). In review of yesterday's note (6/4/2022), there are no changes except as documented above.

## 2022-06-05 NOTE — CARE PLAN
The patient is Stable - Low risk of patient condition declining or worsening    Shift Goals  Clinical Goals: Comfort, pain control  Patient Goals: Comfort, pain control  Family Goals:     Progress made toward(s) clinical / shift goals:     Problem: Skin Integrity  Goal: Skin integrity is maintained or improved  Outcome: Progressing     Problem: Fall Risk  Goal: Patient will remain free from falls  Outcome: Progressing     Problem: Pain - Standard  Goal: Alleviation of pain or a reduction in pain to the patient’s comfort goal  Outcome: Progressing       Patient is not progressing towards the following goals:

## 2022-06-06 PROCEDURE — 302118 SHAMPOO,NO RINSE: Performed by: INTERNAL MEDICINE

## 2022-06-06 PROCEDURE — 99231 SBSQ HOSP IP/OBS SF/LOW 25: CPT | Performed by: INTERNAL MEDICINE

## 2022-06-06 PROCEDURE — A9270 NON-COVERED ITEM OR SERVICE: HCPCS

## 2022-06-06 PROCEDURE — A9270 NON-COVERED ITEM OR SERVICE: HCPCS | Performed by: INTERNAL MEDICINE

## 2022-06-06 PROCEDURE — 770004 HCHG ROOM/CARE - ONCOLOGY PRIVATE *

## 2022-06-06 PROCEDURE — 700102 HCHG RX REV CODE 250 W/ 637 OVERRIDE(OP)

## 2022-06-06 PROCEDURE — 700102 HCHG RX REV CODE 250 W/ 637 OVERRIDE(OP): Performed by: INTERNAL MEDICINE

## 2022-06-06 RX ADMIN — OXYCODONE HYDROCHLORIDE 10 MG: 10 TABLET ORAL at 15:31

## 2022-06-06 RX ADMIN — LORAZEPAM 0.5 MG: 0.5 TABLET ORAL at 22:28

## 2022-06-06 RX ADMIN — OXYCODONE HYDROCHLORIDE 10 MG: 10 TABLET ORAL at 22:29

## 2022-06-06 RX ADMIN — OXYCODONE HYDROCHLORIDE 10 MG: 10 TABLET ORAL at 08:10

## 2022-06-06 NOTE — CARE PLAN
The patient is Stable - Low risk of patient condition declining or worsening    Shift Goals  Clinical Goals: Comfort  Patient Goals: Comfort  Family Goals: MORALES    Progress made toward(s) clinical / shift goals:      Patient is not progressing towards the following goals:

## 2022-06-06 NOTE — PROGRESS NOTES
Hospital Medicine Daily Progress Note    Date of Service  6/6/2022    Chief Complaint  Maral Herrera is a 56 y.o. female admitted 5/14/2022 with 3-day history of severe epistaxis.  She has a known brain tumor, substance abuse (methamphetamine, alcohol, marijuana), severe dilated cardiomyopathy with echocardiogram on 4/11/2022 showing LVEF of about 15-20% (resumed to be due to methamphetamine use), cirrhosis (reportedly has TIPS in place).  She resides in a nursing home and was on Xarelto for DVT prophylaxis.  Patient underwent surgery for her brain tumor, procedure was aborted due to severe bleeding.     Hospital Course  On admission, patient was febrile with a UTI.  Blood and urine cultures from 5/15/2022 grew ESBL E. Coli.  Patient developed septic shock which resolved after antibiotics and fluid resuscitation.  She completed her course of antibiotics with meropenem.  She has been thrombocytopenic since admission.     Due to the patient's multiple underlying conditions including brain tumor, severe cardiomyopathy with LVEF of 15%, cirrhosis (meld score of about 40), resulting in guarded prognosis, patient decided to pursue hospice with transition to comfort care.     Interval Problem Update  Patient was transferred to oncology floor on 5/27/2022.  She is afebrile and hemodynamically stable.  Her pain is controlled. Awaiting placement.     I have personally seen and examined the patient at bedside. I discussed the plan of care with bedside RN.     Consultants/Specialty  critical care and infectious disease     Code Status  DNAR/DNI     Disposition  Patient is medically cleared for discharge.   Anticipate discharge to to hospice.  I have placed the appropriate orders for post-discharge needs.     Review of Systems  Review of Systems   Constitutional: Positive for malaise/fatigue.   HENT: Negative.    Eyes: Negative.    Respiratory: Negative.    Cardiovascular: Negative.    Gastrointestinal: Negative.     Genitourinary: Negative.    Musculoskeletal: Negative.    Skin: Negative.    Neurological: Negative.    Endo/Heme/Allergies: Negative.    Psychiatric/Behavioral: Negative.      Physical Exam  Temp:  [36.6 °C (97.8 °F)-37.1 °C (98.8 °F)] 36.8 °C (98.3 °F)  Pulse:  [65-80] 80  Resp:  [14-20] 18  BP: (102-125)/(61-76) 102/62  SpO2:  [93 %-95 %] 93 %    Physical Exam  Constitutional:       Appearance: She is ill-appearing.      Comments: cachectic   HENT:      Head: Normocephalic.      Nose: Nose normal.      Mouth/Throat:      Mouth: Mucous membranes are dry.   Eyes:      Pupils: Pupils are equal, round, and reactive to light.   Cardiovascular:      Rate and Rhythm: Normal rate.   Pulmonary:      Effort: Pulmonary effort is normal.   Abdominal:      Palpations: Abdomen is soft.   Musculoskeletal:      Cervical back: Normal range of motion.      Right lower leg: No edema.      Left lower leg: No edema.   Skin:     General: Skin is warm.      Coloration: Skin is jaundiced.   Neurological:      Mental Status: She is alert. Mental status is at baseline.   Psychiatric:         Mood and Affect: Mood normal.     Fluids    Intake/Output Summary (Last 24 hours) at 6/6/2022 1158  Last data filed at 6/6/2022 0400  Gross per 24 hour   Intake 480 ml   Output 350 ml   Net 130 ml       Laboratory                        Imaging  DX-CHEST-PORTABLE (1 VIEW)   Final Result      No radiographic evidence of acute pulmonary tuberculosis.      DX-CHEST-PORTABLE (1 VIEW)   Final Result         1.  No acute cardiopulmonary disease.   2.  Cardiomegaly   3.  Atherosclerosis           Assessment/Plan  * Epistaxis- (present on admission)  Assessment & Plan  Resolved.  INR was elevated on admission.  On comfort care    Bacteremia  Assessment & Plan  Completed antibiotic course for ESBL E.Coli bacteremia. 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 Awaiting Hospice  arrangements    Acute cystitis without hematuria  Assessment & Plan  Completed antibiotic course    Hyponatremia- (present on admission)  Assessment & Plan  Secondary to cirrhosis.  On comfort care     Septic shock (HCC)  Assessment & Plan  Resolved.  Secondary to E. coli ESBL bacteremia and UTI.  Now on comfort care, pending placement for hospice    Supratherapeutic INR- (present on admission)  Assessment & Plan  INR was supratherapeutic on admission, likely secondary to liver failure. Improved.    Anemia- (present on admission)  Assessment & Plan  Comfort Care    Advance care planning- (present on admission)  Assessment & Plan  CODE STATUS was obtained. Patient is DNR/DNI. On comfort care.    Thrombocytopenia (HCC)- (present on admission)  Assessment & Plan  Likely secondary to liver cirrhosis.  On comfort care, awaiting placement for hospice.    Severe protein-calorie malnutrition (HCC)- (present on admission)  Assessment & Plan  On comfort care    Dilated cardiomyopathy (HCC)- (present on admission)  Assessment & Plan  Secondary to substance abuse.  Echocardiogram from 4/11/2022 showed LVEF of ~ 15-20%. On comfort Care    Methamphetamine abuse (HCC)- (present on admission)  Assessment & Plan  On comfort care    Liver cirrhosis (HCC)- (present on admission)  Assessment & Plan  Latest meld score was about 40.  Comfort care         VTE prophylaxis: Comfort Care    I have performed a physical exam and reviewed and updated ROS and Plan today (6/6/2022). In review of yesterday's note (6/5/2022), there are no changes except as documented above.

## 2022-06-06 NOTE — CARE PLAN
The patient is Stable - Low risk of patient condition declining or worsening    Shift Goals  Clinical Goals: Comfort  Patient Goals: Comfort  Family Goals:     Progress made toward(s) clinical / shift goals:     Problem: Pain - Standard  Goal: Alleviation of pain or a reduction in pain to the patient’s comfort goal  Outcome: Progressing     Problem: Skin Integrity  Goal: Skin integrity is maintained or improved  Outcome: Progressing     Problem: Fall Risk  Goal: Patient will remain free from falls  Outcome: Progressing       Patient is not progressing towards the following goals:

## 2022-06-07 PROCEDURE — 770004 HCHG ROOM/CARE - ONCOLOGY PRIVATE *

## 2022-06-07 PROCEDURE — 700102 HCHG RX REV CODE 250 W/ 637 OVERRIDE(OP): Performed by: INTERNAL MEDICINE

## 2022-06-07 PROCEDURE — 700102 HCHG RX REV CODE 250 W/ 637 OVERRIDE(OP)

## 2022-06-07 PROCEDURE — A9270 NON-COVERED ITEM OR SERVICE: HCPCS

## 2022-06-07 PROCEDURE — A9270 NON-COVERED ITEM OR SERVICE: HCPCS | Performed by: INTERNAL MEDICINE

## 2022-06-07 PROCEDURE — 700102 HCHG RX REV CODE 250 W/ 637 OVERRIDE(OP): Performed by: STUDENT IN AN ORGANIZED HEALTH CARE EDUCATION/TRAINING PROGRAM

## 2022-06-07 PROCEDURE — A9270 NON-COVERED ITEM OR SERVICE: HCPCS | Performed by: STUDENT IN AN ORGANIZED HEALTH CARE EDUCATION/TRAINING PROGRAM

## 2022-06-07 PROCEDURE — 99231 SBSQ HOSP IP/OBS SF/LOW 25: CPT | Performed by: INTERNAL MEDICINE

## 2022-06-07 RX ADMIN — LORAZEPAM 0.5 MG: 0.5 TABLET ORAL at 22:54

## 2022-06-07 RX ADMIN — LORAZEPAM 0.5 MG: 0.5 TABLET ORAL at 03:20

## 2022-06-07 RX ADMIN — OXYCODONE HYDROCHLORIDE 10 MG: 10 TABLET ORAL at 20:11

## 2022-06-07 RX ADMIN — ATORVASTATIN CALCIUM 20 MG: 20 TABLET, FILM COATED ORAL at 17:50

## 2022-06-07 RX ADMIN — OXYCODONE HYDROCHLORIDE 10 MG: 10 TABLET ORAL at 03:19

## 2022-06-07 RX ADMIN — SCOPALAMINE 1 PATCH: 1 PATCH, EXTENDED RELEASE TRANSDERMAL at 12:57

## 2022-06-07 ASSESSMENT — PAIN DESCRIPTION - PAIN TYPE
TYPE: ACUTE PAIN
TYPE: ACUTE PAIN

## 2022-06-07 NOTE — PROGRESS NOTES
Received bedside shift report regarding patient and assumed care. Patient is awake, calm and stable, currently positioned in bed for comfort and safety; call light within reach. Denies any pain or discomfort at this time. Patient is on contact isolation for ESBL. Will continue to monitor.

## 2022-06-07 NOTE — DISCHARGE PLANNING
Case Management Discharge Planning    Admission Date: 5/14/2022  GMLOS: 4.8  ALOS: 24    6-Clicks ADL Score: 18  6-Clicks Mobility Score: 14  PT and/or OT Eval ordered: Yes   Post-acute Referrals Ordered: Yes  Post-acute Choice Obtained: Yes  Has referral(s) been sent to post-acute provider:  Yes      Anticipated Discharge Dispo: Discharge Disposition: D/T to hospice home (50)  [facility] is following has accepted    DME Needed: No    Action(s) Taken: Updated Provider/Nurse on Discharge Plan  Escalations Completed: Leadership    Medically Clear: Yes    Next Steps: CM placed a follow up call to Azar pt's care giver [phone] but was unable to leave a message the mailbox is full.CM give pt's mother Diane  [phone]  update on discharge plan. Per Diane her dtr cannot come to live with her because she will not be able to care for her but she is willing to help with payment of long term bed at a SNF. CM gave telephone to contact SNF. CM will follow up with Rehanaia and pt's mom in am.    Barriers to Discharge: Pending Placement    Is the patient up for discharge tomorrow: No

## 2022-06-07 NOTE — CARE PLAN
The patient is Stable - Low risk of patient condition declining or worsening    Shift Goals  Clinical Goals: comfort, pain control  Patient Goals: rest, comfort  Family Goals: MORALES    Progress made toward(s) clinical / shift goals:  Patient resting well throughout shift with no complaints of pain. PRNs available when needed. Patient will move to room 306 closer to nurses' station due to being impulsive and high fall risk.     Problem: Knowledge Deficit - Standard  Goal: Patient and family/care givers will demonstrate understanding of plan of care, disease process/condition, diagnostic tests and medications  Outcome: Progressing     Problem: Skin Integrity  Goal: Skin integrity is maintained or improved  Outcome: Progressing     Problem: Fall Risk  Goal: Patient will remain free from falls  Outcome: Progressing

## 2022-06-07 NOTE — PROGRESS NOTES
Hospital Medicine Daily Progress Note    Date of Service  6/7/2022    Chief Complaint  Maral Herrera is a 56 y.o. female admitted 5/14/2022 with epistaxis    Hospital Course  No notes on file    Interval Problem Update  Patient was seen and examined at bedside.  I have personally reviewed and interpreted vitals, labs, and imaging.    6/7.  Afebrile.  Stable vitals.  On room air.  Patient is sleepy and lethargic.  Reports no acute complaints.  Awaiting placement with hospice.  Accepted by [facility].  Discussed with case management.  Disposition possibly home with caretaker Azar vs SNF    I have personally seen and examined the patient at bedside. I discussed the plan of care with patient, bedside RN and .    Consultants/Specialty  infectious disease, palliative care and psychiatry    Code Status  DNAR/DNI    Disposition  Patient is medically cleared for discharge.   Anticipate discharge to to hospice.  I have placed the appropriate orders for post-discharge needs.    Review of Systems  Review of Systems   Unable to perform ROS: Medical condition        Physical Exam  Temp:  [36.6 °C (97.8 °F)-37 °C (98.6 °F)] 36.6 °C (97.8 °F)  Pulse:  [70-80] 70  Resp:  [18] 18  BP: (102-123)/(62-76) 123/71  SpO2:  [91 %-97 %] 97 %    Physical Exam  Vitals and nursing note reviewed.   Constitutional:       General: She is sleeping.      Appearance: She is ill-appearing.   HENT:      Head: Normocephalic and atraumatic.      Right Ear: External ear normal.      Left Ear: External ear normal.      Nose: Nose normal.      Mouth/Throat:      Mouth: Mucous membranes are dry.      Pharynx: Oropharynx is clear.   Eyes:      Extraocular Movements: Extraocular movements intact.      Conjunctiva/sclera: Conjunctivae normal.   Cardiovascular:      Rate and Rhythm: Normal rate and regular rhythm.      Pulses: Normal pulses.      Heart sounds: Normal heart sounds. No murmur heard.  Pulmonary:      Effort: Pulmonary effort is normal.  No respiratory distress.      Breath sounds: Normal breath sounds. No stridor. No wheezing or rales.   Abdominal:      General: Abdomen is flat. Bowel sounds are normal. There is no distension.      Palpations: Abdomen is soft. There is no mass.      Tenderness: There is no abdominal tenderness.   Musculoskeletal:      Cervical back: Normal range of motion.   Skin:     General: Skin is warm.      Capillary Refill: Capillary refill takes less than 2 seconds.   Neurological:      General: No focal deficit present.      Mental Status: Mental status is at baseline. She is lethargic and disoriented.      Cranial Nerves: No cranial nerve deficit.   Psychiatric:      Comments: Difficult to assess as patient is minimally verbal.         Fluids    Intake/Output Summary (Last 24 hours) at 6/7/2022 0604  Last data filed at 6/7/2022 0523  Gross per 24 hour   Intake --   Output 300 ml   Net -300 ml       Laboratory                        Imaging  DX-CHEST-PORTABLE (1 VIEW)   Final Result      No radiographic evidence of acute pulmonary tuberculosis.      DX-CHEST-PORTABLE (1 VIEW)   Final Result         1.  No acute cardiopulmonary disease.   2.  Cardiomegaly   3.  Atherosclerosis           Assessment/Plan  * Epistaxis- (present on admission)  Assessment & Plan  Resolved.  INR was elevated on admission.  On comfort care    Bacteremia  Assessment & Plan  Completed antibiotic course for ESBL E.Coli bacteremia. 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 Awaiting Hospice arrangements    Acute cystitis without hematuria  Assessment & Plan  Completed antibiotic course    Hyponatremia- (present on admission)  Assessment & Plan  Secondary to cirrhosis.  On comfort care     Septic shock (HCC)  Assessment & Plan  Resolved.  Secondary to E. coli ESBL bacteremia and UTI.  Now on comfort care, pending placement for hospice    Supratherapeutic INR- (present on  admission)  Assessment & Plan  INR was supratherapeutic on admission, likely secondary to liver failure. Improved.    Anemia- (present on admission)  Assessment & Plan  Comfort Care    Advance care planning- (present on admission)  Assessment & Plan  CODE STATUS was obtained. Patient is DNR/DNI. On comfort care.    Thrombocytopenia (HCC)- (present on admission)  Assessment & Plan  Likely secondary to liver cirrhosis.  On comfort care, awaiting placement for hospice.    Severe protein-calorie malnutrition (HCC)- (present on admission)  Assessment & Plan  On comfort care    Dilated cardiomyopathy (HCC)- (present on admission)  Assessment & Plan  Secondary to substance abuse.  Echocardiogram from 4/11/2022 showed LVEF of ~ 15-20%. On comfort Care    Methamphetamine abuse (HCC)- (present on admission)  Assessment & Plan  On comfort care  Counseled about lifestyle changes.    Liver cirrhosis (HCC)- (present on admission)  Assessment & Plan  Latest meld score was about 40.  Comfort care       VTE prophylaxis: pharmacologic prophylaxis contraindicated due to Comfort care    I have performed a physical exam and reviewed and updated ROS and Plan today (6/7/2022). In review of yesterday's note (6/6/2022), there are no changes except as documented above.

## 2022-06-08 PROCEDURE — 770004 HCHG ROOM/CARE - ONCOLOGY PRIVATE *

## 2022-06-08 PROCEDURE — 99231 SBSQ HOSP IP/OBS SF/LOW 25: CPT | Performed by: INTERNAL MEDICINE

## 2022-06-08 PROCEDURE — A9270 NON-COVERED ITEM OR SERVICE: HCPCS | Performed by: STUDENT IN AN ORGANIZED HEALTH CARE EDUCATION/TRAINING PROGRAM

## 2022-06-08 PROCEDURE — 700102 HCHG RX REV CODE 250 W/ 637 OVERRIDE(OP): Performed by: STUDENT IN AN ORGANIZED HEALTH CARE EDUCATION/TRAINING PROGRAM

## 2022-06-08 RX ADMIN — ATORVASTATIN CALCIUM 20 MG: 20 TABLET, FILM COATED ORAL at 19:03

## 2022-06-08 RX ADMIN — FUROSEMIDE 20 MG: 20 TABLET ORAL at 05:06

## 2022-06-08 RX ADMIN — METOPROLOL SUCCINATE 25 MG: 25 TABLET, EXTENDED RELEASE ORAL at 05:06

## 2022-06-08 ASSESSMENT — PAIN DESCRIPTION - PAIN TYPE: TYPE: ACUTE PAIN

## 2022-06-08 NOTE — CARE PLAN
Problem: Knowledge Deficit - Standard  Goal: Patient and family/care givers will demonstrate understanding of plan of care, disease process/condition, diagnostic tests and medications  Outcome: Not Met     Problem: Fall Risk  Goal: Patient will remain free from falls  Outcome: Not Met     Problem: Self Care  Goal: Patient will have the ability to perform ADLs independently or with assistance (bathe, groom, dress, toilet and feed)  Outcome: Not Met     Problem: Knowledge Deficit - Standard  Goal: Patient and family/care givers will demonstrate understanding of plan of care, disease process/condition, diagnostic tests and medications  Outcome: Not Met     Problem: Fall Risk  Goal: Patient will remain free from falls  Outcome: Not Met     Problem: Self Care  Goal: Patient will have the ability to perform ADLs independently or with assistance (bathe, groom, dress, toilet and feed)  Outcome: Not Met     Problem: Pain - Standard  Goal: Alleviation of pain or a reduction in pain to the patient’s comfort goal  Outcome: Progressing     Shift Goals  Clinical Goals: pain control  Patient Goals: sleep  Family Goals: MORALES      Patient is not progressing towards the following goals:      Problem: Knowledge Deficit - Standard  Goal: Patient and family/care givers will demonstrate understanding of plan of care, disease process/condition, diagnostic tests and medications  Outcome: Not Met     Problem: Fall Risk  Goal: Patient will remain free from falls  Outcome: Not Met     Problem: Self Care  Goal: Patient will have the ability to perform ADLs independently or with assistance (bathe, groom, dress, toilet and feed)  Outcome: Not Met

## 2022-06-08 NOTE — PROGRESS NOTES
Hospital Medicine Daily Progress Note    Date of Service  6/8/2022    Chief Complaint  Maral Herrera is a 56 y.o. female admitted 5/14/2022 with epistaxis    Hospital Course  No notes on file    Interval Problem Update  Patient was seen and examined at bedside.  I have personally reviewed and interpreted vitals, labs, and imaging.    6/7.  Afebrile.  Stable vitals.  On room air.  Patient is sleepy and lethargic.  Reports no acute complaints.  Awaiting placement with hospice.  Accepted by [facility].  Discussed with case management.  Disposition possibly home with caretaker Azar vs SNF  6/8.  Afebrile.  Stable vitals.  On room air.  Patient is very lethargic.  No acute complaints.  Mother would like to pay SNF and become POA.  Apparently patient previously said did not discuss case with mother.  She also has a caretaker Elicia who said that she did take the patient home in a few days but has not been answering phone calls.  Apparently her caretaker also takes care of a significant other    I have personally seen and examined the patient at bedside. I discussed the plan of care with patient, bedside RN and .    Consultants/Specialty  infectious disease, palliative care and psychiatry    Code Status  DNAR/DNI    Disposition  Patient is medically cleared for discharge.   Anticipate discharge to to hospice.  I have placed the appropriate orders for post-discharge needs.    Review of Systems  Review of Systems   Unable to perform ROS: Medical condition        Physical Exam  Temp:  [36.6 °C (97.8 °F)-37 °C (98.6 °F)] 37 °C (98.6 °F)  Pulse:  [77-96] 90  Resp:  [17-18] 18  BP: (109-135)/(71-94) 135/94  SpO2:  [93 %-95 %] 94 %    Physical Exam  Vitals and nursing note reviewed.   Constitutional:       General: She is sleeping.      Appearance: She is ill-appearing.   HENT:      Head: Normocephalic and atraumatic.      Right Ear: External ear normal.      Left Ear: External ear normal.      Nose: Nose normal.       Mouth/Throat:      Mouth: Mucous membranes are dry.      Pharynx: Oropharynx is clear.   Eyes:      Extraocular Movements: Extraocular movements intact.      Conjunctiva/sclera: Conjunctivae normal.   Cardiovascular:      Rate and Rhythm: Normal rate and regular rhythm.      Pulses: Normal pulses.      Heart sounds: Normal heart sounds. No murmur heard.  Pulmonary:      Effort: Pulmonary effort is normal. No respiratory distress.      Breath sounds: Normal breath sounds. No stridor. No wheezing or rales.   Abdominal:      General: Abdomen is flat. Bowel sounds are normal. There is no distension.      Palpations: Abdomen is soft. There is no mass.      Tenderness: There is no abdominal tenderness.   Musculoskeletal:      Cervical back: Normal range of motion.   Skin:     General: Skin is warm.      Capillary Refill: Capillary refill takes less than 2 seconds.   Neurological:      General: No focal deficit present.      Mental Status: Mental status is at baseline. She is lethargic and disoriented.      Cranial Nerves: No cranial nerve deficit.   Psychiatric:      Comments: Difficult to assess as patient is minimally verbal.         Fluids    Intake/Output Summary (Last 24 hours) at 6/8/2022 0611  Last data filed at 6/7/2022 2300  Gross per 24 hour   Intake 240 ml   Output 0 ml   Net 240 ml       Laboratory                        Imaging  DX-CHEST-PORTABLE (1 VIEW)   Final Result      No radiographic evidence of acute pulmonary tuberculosis.      DX-CHEST-PORTABLE (1 VIEW)   Final Result         1.  No acute cardiopulmonary disease.   2.  Cardiomegaly   3.  Atherosclerosis           Assessment/Plan  * Epistaxis- (present on admission)  Assessment & Plan  Resolved.  INR was elevated on admission.  On comfort care    Bacteremia  Assessment & Plan  Completed antibiotic course for ESBL E.Coli bacteremia. 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 Awaiting  Hospice arrangements    Acute cystitis without hematuria  Assessment & Plan  Completed antibiotic course    Hyponatremia- (present on admission)  Assessment & Plan  Secondary to cirrhosis.  On comfort care     Septic shock (HCC)  Assessment & Plan  Resolved.  Secondary to E. coli ESBL bacteremia and UTI.  Now on comfort care, pending placement for hospice    Supratherapeutic INR- (present on admission)  Assessment & Plan  INR was supratherapeutic on admission, likely secondary to liver failure. Improved.    Anemia- (present on admission)  Assessment & Plan  Comfort Care    Advance care planning- (present on admission)  Assessment & Plan  CODE STATUS was obtained. Patient is DNR/DNI. On comfort care.    Thrombocytopenia (HCC)- (present on admission)  Assessment & Plan  Likely secondary to liver cirrhosis.  On comfort care, awaiting placement for hospice.    Severe protein-calorie malnutrition (HCC)- (present on admission)  Assessment & Plan  On comfort care    Dilated cardiomyopathy (HCC)- (present on admission)  Assessment & Plan  Secondary to substance abuse.  Echocardiogram from 4/11/2022 showed LVEF of ~ 15-20%. On comfort Care    Methamphetamine abuse (HCC)- (present on admission)  Assessment & Plan  On comfort care  Counseled about lifestyle changes.    Liver cirrhosis (HCC)- (present on admission)  Assessment & Plan  Latest meld score was about 40.  Comfort care       VTE prophylaxis: pharmacologic prophylaxis contraindicated due to Comfort care    I have performed a physical exam and reviewed and updated ROS and Plan today (6/8/2022). In review of yesterday's note (6/7/2022), there are no changes except as documented above.

## 2022-06-09 PROCEDURE — 99231 SBSQ HOSP IP/OBS SF/LOW 25: CPT | Performed by: INTERNAL MEDICINE

## 2022-06-09 PROCEDURE — A9270 NON-COVERED ITEM OR SERVICE: HCPCS

## 2022-06-09 PROCEDURE — 770004 HCHG ROOM/CARE - ONCOLOGY PRIVATE *

## 2022-06-09 PROCEDURE — A9270 NON-COVERED ITEM OR SERVICE: HCPCS | Performed by: STUDENT IN AN ORGANIZED HEALTH CARE EDUCATION/TRAINING PROGRAM

## 2022-06-09 PROCEDURE — 700102 HCHG RX REV CODE 250 W/ 637 OVERRIDE(OP): Performed by: STUDENT IN AN ORGANIZED HEALTH CARE EDUCATION/TRAINING PROGRAM

## 2022-06-09 PROCEDURE — 700102 HCHG RX REV CODE 250 W/ 637 OVERRIDE(OP)

## 2022-06-09 RX ADMIN — FUROSEMIDE 20 MG: 20 TABLET ORAL at 05:02

## 2022-06-09 RX ADMIN — OXYCODONE 5 MG: 5 TABLET ORAL at 21:53

## 2022-06-09 ASSESSMENT — PATIENT HEALTH QUESTIONNAIRE - PHQ9
SUM OF ALL RESPONSES TO PHQ9 QUESTIONS 1 AND 2: 0
2. FEELING DOWN, DEPRESSED, IRRITABLE, OR HOPELESS: NOT AT ALL
1. LITTLE INTEREST OR PLEASURE IN DOING THINGS: NOT AT ALL

## 2022-06-09 ASSESSMENT — PAIN DESCRIPTION - PAIN TYPE: TYPE: ACUTE PAIN

## 2022-06-09 NOTE — CONSULTS
Brief Behavioral Health Note:    Patient observed sleeping, very difficult to aroused. Patient unable to engage in psychotherapy session at this time.    Izabella Bermeo, Ph.D., [facility]

## 2022-06-09 NOTE — PROGRESS NOTES
Hospital Medicine Daily Progress Note    Date of Service  6/9/2022    Chief Complaint  Maral Herrera is a 56 y.o. female admitted 5/14/2022 with epistaxis    Hospital Course  No notes on file    Ms. Maral Herrera is a 56 y.o. female with history of brain tumor, polysubstance abuse (methamphetamine, alcohol, marijuana), heart failure with reduced ejection fraction of 15-20% assumed to be secondary to methamphetamine abuse, cirrhosis status post TIPS, history of blood clots on rivaroxaban who presented on 5/14/2022 with epistaxis, shock, supratherapeutic INR.  Anticoagulation was reversed with Kcentra.  Rivaroxaban was held.  Cultures grew ESBL E. coli bacteremia as well as in the urine culture.  The patient did have septic shock which resolved with fluids and antibiotics.  Infectious disease was following.  She completed course of meropenem.  Due to the patient's multiple underlying conditions including brain tumor, severe cardiomyopathy with LVEF of 15%, cirrhosis (meld score of about 40), resulting in guarded prognosis, patient decided to pursue hospice with transition to comfort care.      Interval Problem Update  Patient was seen and examined at bedside.  I have personally reviewed and interpreted vitals, labs, and imaging.    6/7.  Afebrile.  Stable vitals.  On room air.  Patient is sleepy and lethargic.  Reports no acute complaints.  Awaiting placement with hospice.  Accepted by [facility].  Discussed with case management.  Disposition possibly home with caretaker Azar vs SNF  6/8.  Afebrile.  Stable vitals.  On room air.  Patient is very lethargic.  No acute complaints.  Mother would like to pay SNF and become POA.  Apparently patient previously said did not discuss case with mother.  She also has a caretaker Elicia who said that she did take the patient home in a few days but has not been answering phone calls.  Apparently her caretaker also takes care of a significant other  6/9.  Afebrile.  Has been  hypotensive.  On room air.  Patient slightly more awake today.  She is alert and oriented.  Denies fevers, chills, chest pains, shortness of breath.  Patient states that she would rather go home with her caretaker Elicia rather than being put in a SNF by her mom.    I have personally seen and examined the patient at bedside. I discussed the plan of care with patient, bedside RN and .    Consultants/Specialty  infectious disease, palliative care and psychiatry    Code Status  DNAR/DNI    Disposition  Patient is medically cleared for discharge.   Anticipate discharge to to hospice.  I have placed the appropriate orders for post-discharge needs.    Review of Systems  Review of Systems   Unable to perform ROS: Medical condition        Physical Exam  Temp:  [35.9 °C (96.7 °F)-37.1 °C (98.7 °F)] 35.9 °C (96.7 °F)  Pulse:  [62-73] 65  Resp:  [15-19] 16  BP: ()/(49-79) 97/49  SpO2:  [90 %-97 %] 90 %    Physical Exam  Vitals and nursing note reviewed.   Constitutional:       General: She is sleeping.      Appearance: She is ill-appearing.   HENT:      Head: Normocephalic and atraumatic.      Right Ear: External ear normal.      Left Ear: External ear normal.      Nose: Nose normal.      Mouth/Throat:      Mouth: Mucous membranes are dry.      Pharynx: Oropharynx is clear.   Eyes:      Extraocular Movements: Extraocular movements intact.      Conjunctiva/sclera: Conjunctivae normal.   Cardiovascular:      Rate and Rhythm: Normal rate and regular rhythm.      Pulses: Normal pulses.      Heart sounds: Normal heart sounds. No murmur heard.  Pulmonary:      Effort: Pulmonary effort is normal. No respiratory distress.      Breath sounds: Normal breath sounds. No stridor. No wheezing or rales.   Abdominal:      General: Abdomen is flat. Bowel sounds are normal. There is no distension.      Palpations: Abdomen is soft. There is no mass.      Tenderness: There is no abdominal tenderness.   Musculoskeletal:       Cervical back: Normal range of motion.   Skin:     General: Skin is warm.      Capillary Refill: Capillary refill takes less than 2 seconds.   Neurological:      General: No focal deficit present.      Mental Status: Mental status is at baseline. She is lethargic and disoriented.      Cranial Nerves: No cranial nerve deficit.   Psychiatric:      Comments: Difficult to assess as patient is minimally verbal.         Fluids    Intake/Output Summary (Last 24 hours) at 6/9/2022 0607  Last data filed at 6/8/2022 1404  Gross per 24 hour   Intake --   Output 900 ml   Net -900 ml       Laboratory                        Imaging  DX-CHEST-PORTABLE (1 VIEW)   Final Result      No radiographic evidence of acute pulmonary tuberculosis.      DX-CHEST-PORTABLE (1 VIEW)   Final Result         1.  No acute cardiopulmonary disease.   2.  Cardiomegaly   3.  Atherosclerosis           Assessment/Plan  * Epistaxis- (present on admission)  Assessment & Plan  Resolved.  INR was elevated on admission.  On comfort care    Bacteremia  Assessment & Plan  Completed antibiotic course for ESBL E.Coli bacteremia. 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 Awaiting Hospice arrangements    Acute cystitis without hematuria  Assessment & Plan  Completed antibiotic course    Hyponatremia- (present on admission)  Assessment & Plan  Secondary to cirrhosis.  On comfort care     Septic shock (HCC)  Assessment & Plan  Resolved.  Secondary to E. coli ESBL bacteremia and UTI.  Now on comfort care, pending placement for hospice    Supratherapeutic INR- (present on admission)  Assessment & Plan  INR was supratherapeutic on admission, likely secondary to liver failure. Improved.    Anemia- (present on admission)  Assessment & Plan  Comfort Care    Advance care planning- (present on admission)  Assessment & Plan  CODE STATUS was obtained. Patient is DNR/DNI. On comfort care.    Thrombocytopenia  (HCC)- (present on admission)  Assessment & Plan  Likely secondary to liver cirrhosis.  On comfort care, awaiting placement for hospice.    Severe protein-calorie malnutrition (HCC)- (present on admission)  Assessment & Plan  On comfort care    Dilated cardiomyopathy (HCC)- (present on admission)  Assessment & Plan  Secondary to substance abuse.  Echocardiogram from 4/11/2022 showed LVEF of ~ 15-20%. On comfort Care    Methamphetamine abuse (HCC)- (present on admission)  Assessment & Plan  On comfort care  Counseled about lifestyle changes.    Liver cirrhosis (HCC)- (present on admission)  Assessment & Plan  Latest meld score was about 40.  Comfort care       VTE prophylaxis: pharmacologic prophylaxis contraindicated due to Comfort care    I have performed a physical exam and reviewed and updated ROS and Plan today (6/9/2022). In review of yesterday's note (6/8/2022), there are no changes except as documented above.

## 2022-06-09 NOTE — CARE PLAN
The patient is Watcher - Medium risk of patient condition declining or worsening    Shift Goals  Clinical Goals: Safety, Rest  Patient Goals: Rest  Family Goals: MORALES    Progress made toward(s) clinical / shift goals:    Problem: Pain - Standard  Goal: Alleviation of pain or a reduction in pain to the patient’s comfort goal  Outcome: Progressing     Problem: Skin Integrity  Goal: Skin integrity is maintained or improved  Outcome: Progressing  Note: Q2 turns in place, pt can turn self in bed as well     Problem: Fall Risk  Goal: Patient will remain free from falls  Outcome: Progressing     Problem: Mobility  Goal: Patient's capacity to carry out activities will improve  Outcome: Progressing       Patient is not progressing towards the following goals:

## 2022-06-10 PROCEDURE — 700102 HCHG RX REV CODE 250 W/ 637 OVERRIDE(OP)

## 2022-06-10 PROCEDURE — A9270 NON-COVERED ITEM OR SERVICE: HCPCS | Performed by: INTERNAL MEDICINE

## 2022-06-10 PROCEDURE — 770004 HCHG ROOM/CARE - ONCOLOGY PRIVATE *

## 2022-06-10 PROCEDURE — A9270 NON-COVERED ITEM OR SERVICE: HCPCS | Performed by: STUDENT IN AN ORGANIZED HEALTH CARE EDUCATION/TRAINING PROGRAM

## 2022-06-10 PROCEDURE — 700102 HCHG RX REV CODE 250 W/ 637 OVERRIDE(OP): Performed by: STUDENT IN AN ORGANIZED HEALTH CARE EDUCATION/TRAINING PROGRAM

## 2022-06-10 PROCEDURE — 99231 SBSQ HOSP IP/OBS SF/LOW 25: CPT | Performed by: INTERNAL MEDICINE

## 2022-06-10 PROCEDURE — A9270 NON-COVERED ITEM OR SERVICE: HCPCS

## 2022-06-10 PROCEDURE — 700102 HCHG RX REV CODE 250 W/ 637 OVERRIDE(OP): Performed by: INTERNAL MEDICINE

## 2022-06-10 RX ADMIN — SCOPALAMINE 1 PATCH: 1 PATCH, EXTENDED RELEASE TRANSDERMAL at 11:29

## 2022-06-10 RX ADMIN — OXYCODONE HYDROCHLORIDE 10 MG: 10 TABLET ORAL at 20:28

## 2022-06-10 RX ADMIN — FUROSEMIDE 20 MG: 20 TABLET ORAL at 06:13

## 2022-06-10 ASSESSMENT — ENCOUNTER SYMPTOMS
NERVOUS/ANXIOUS: 0
SHORTNESS OF BREATH: 0
DIZZINESS: 0
BACK PAIN: 1
DEPRESSION: 0
SORE THROAT: 0
ABDOMINAL PAIN: 0
FEVER: 0
VOMITING: 0
NAUSEA: 0
CONSTIPATION: 0
BLURRED VISION: 0
CHILLS: 0
HEADACHES: 0
PALPITATIONS: 0
WEAKNESS: 0
COUGH: 0
DIARRHEA: 0

## 2022-06-10 ASSESSMENT — PAIN DESCRIPTION - PAIN TYPE: TYPE: CHRONIC PAIN

## 2022-06-10 NOTE — DISCHARGE PLANNING
Case Management Discharge Planning    Admission Date: 5/14/2022  GMLOS: 4.8  ALOS: 27    6-Clicks ADL Score: 18  6-Clicks Mobility Score: 14  PT and/or OT Eval ordered: Yes  Post-acute Referrals Ordered: Yes  Post-acute Choice Obtained: Yes  Has referral(s) been sent to post-acute provider:  Yes      Anticipated Discharge Dispo: Discharge Disposition: D/T to hospice home (50)        Action(s) Taken: Updated Provider/Nurse on Discharge Plan    Escalations Completed: None    Medically Clear: No    Next Steps: follow up call to Azar haywood's care giver [phone].  No reply.   left requesting call back.  Follow up with patient re dc location.     Barriers to Discharge: Medical clearance    Is the patient up for discharge tomorrow: No

## 2022-06-10 NOTE — CARE PLAN
The patient is Stable - Low risk of patient condition declining or worsening    Shift Goals  Clinical Goals: safety, diuresis  Patient Goals: Rest  Family Goals: MORALES    Progress made toward(s) clinical / shift goals: mobility     Patient is not progressing towards the following goals: DC date TBD     Problem: Discharge Barriers/Planning  Goal: Patient's continuum of care needs are met  Outcome: Progressing  Note: DC home with hospice, [facility] accepted.      Problem: Self Care  Goal: Patient will have the ability to perform ADLs independently or with assistance (bathe, groom, dress, toilet and feed)  Outcome: Progressing  Note: Pt 1 assist stand pivot to commode, tolerated well.

## 2022-06-10 NOTE — PROGRESS NOTES
Hospital Medicine Daily Progress Note    Date of Service  6/10/2022    Chief Complaint  Maral Herrera is a 56 y.o. female admitted 5/14/2022 with epistaxis    Hospital Course  No notes on file    Ms. Maral Herrera is a 56 y.o. female with history of brain tumor, polysubstance abuse (methamphetamine, alcohol, marijuana), heart failure with reduced ejection fraction of 15-20% assumed to be secondary to methamphetamine abuse, cirrhosis status post TIPS, history of blood clots on rivaroxaban who presented on 5/14/2022 with epistaxis, shock, supratherapeutic INR.  Anticoagulation was reversed with Kcentra.  Rivaroxaban was held.  Cultures grew ESBL E. coli bacteremia as well as in the urine culture.  The patient did have septic shock which resolved with fluids and antibiotics.  Infectious disease was following.  She completed course of meropenem.  Due to the patient's multiple underlying conditions including brain tumor, severe cardiomyopathy with LVEF of 15%, cirrhosis (meld score of about 40), resulting in guarded prognosis, patient decided to pursue hospice with transition to comfort care.      Interval Problem Update  Patient was seen and examined at bedside.  I have personally reviewed and interpreted vitals, labs, and imaging.    6/7.  Afebrile.  Stable vitals.  On room air.  Patient is sleepy and lethargic.  Reports no acute complaints.  Awaiting placement with hospice.  Accepted by [facility].  Discussed with case management.  Disposition possibly home with caretaker Azar vs SNF  6/8.  Afebrile.  Stable vitals.  On room air.  Patient is very lethargic.  No acute complaints.  Mother would like to pay SNF and become POA.  Apparently patient previously said did not discuss case with mother.  She also has a caretaker Elicia who said that she did take the patient home in a few days but has not been answering phone calls.  Apparently her caretaker also takes care of a significant other  6/9.  Afebrile.  Has been  hypotensive.  On room air.  Patient slightly more awake today.  She is alert and oriented.  Denies fevers, chills, chest pains, shortness of breath.  Patient states that she would rather go home with her caretaker Elicia rather than being put in a SNF by her mom.  6/10.  Afebrile.  Stable vitals.  On room air.  Patient much more awake today.  Alert and oriented.  Friend is at bedside.  Denies fever, chills, chest pains, shortness of breath.  She does report some low back pain, which she states is chronic.  Patient would like to go home with caretaker Elicia who we have not been able to get a hold of.    I have personally seen and examined the patient at bedside. I discussed the plan of care with patient, bedside RN and .    Consultants/Specialty  infectious disease, palliative care and psychiatry    Code Status  DNAR/DNI    Disposition  Patient is medically cleared for discharge.   Anticipate discharge to to hospice.  I have placed the appropriate orders for post-discharge needs.    Review of Systems  Review of Systems   Constitutional: Negative for chills and fever.   HENT: Negative for congestion and sore throat.    Eyes: Negative for blurred vision.   Respiratory: Negative for cough and shortness of breath.    Cardiovascular: Negative for chest pain, palpitations and leg swelling.   Gastrointestinal: Negative for abdominal pain, constipation, diarrhea, nausea and vomiting.   Genitourinary: Negative for dysuria, frequency and urgency.   Musculoskeletal: Positive for back pain.   Skin: Negative for rash.   Neurological: Negative for dizziness, weakness and headaches.   Psychiatric/Behavioral: Negative for depression. The patient is not nervous/anxious.    All other systems reviewed and are negative.       Physical Exam  Temp:  [36.1 °C (96.9 °F)-36.7 °C (98.1 °F)] 36.4 °C (97.5 °F)  Pulse:  [61-66] 61  Resp:  [15-17] 16  BP: (102-119)/(59-65) 119/65  SpO2:  [92 %-96 %] 92 %    Physical Exam  Vitals and  nursing note reviewed.   Constitutional:       General: She is sleeping.      Appearance: She is ill-appearing.   HENT:      Head: Normocephalic and atraumatic.      Right Ear: External ear normal.      Left Ear: External ear normal.      Nose: Nose normal.      Mouth/Throat:      Mouth: Mucous membranes are dry.      Pharynx: Oropharynx is clear.   Eyes:      Extraocular Movements: Extraocular movements intact.      Conjunctiva/sclera: Conjunctivae normal.   Cardiovascular:      Rate and Rhythm: Normal rate and regular rhythm.      Pulses: Normal pulses.      Heart sounds: Normal heart sounds. No murmur heard.  Pulmonary:      Effort: Pulmonary effort is normal. No respiratory distress.      Breath sounds: Normal breath sounds. No stridor. No wheezing or rales.   Abdominal:      General: Abdomen is flat. Bowel sounds are normal. There is no distension.      Palpations: Abdomen is soft. There is no mass.      Tenderness: There is no abdominal tenderness.   Musculoskeletal:      Cervical back: Normal range of motion.   Skin:     General: Skin is warm.      Capillary Refill: Capillary refill takes less than 2 seconds.   Neurological:      General: No focal deficit present.      Mental Status: She is oriented to person, place, and time. Mental status is at baseline. She is lethargic.      Cranial Nerves: No cranial nerve deficit.   Psychiatric:      Comments: Difficult to assess as patient is minimally verbal.         Fluids    Intake/Output Summary (Last 24 hours) at 6/10/2022 0619  Last data filed at 6/10/2022 0500  Gross per 24 hour   Intake 400 ml   Output 400 ml   Net 0 ml       Laboratory                        Imaging  DX-CHEST-PORTABLE (1 VIEW)   Final Result      No radiographic evidence of acute pulmonary tuberculosis.      DX-CHEST-PORTABLE (1 VIEW)   Final Result         1.  No acute cardiopulmonary disease.   2.  Cardiomegaly   3.  Atherosclerosis           Assessment/Plan  * Epistaxis- (present on  admission)  Assessment & Plan  Resolved.  INR was elevated on admission.  On comfort care    Bacteremia  Assessment & Plan  Completed antibiotic course for ESBL E.Coli bacteremia. 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 Awaiting Hospice arrangements    Acute cystitis without hematuria  Assessment & Plan  Completed antibiotic course    Hyponatremia- (present on admission)  Assessment & Plan  Secondary to cirrhosis.  On comfort care     Septic shock (HCC)  Assessment & Plan  Resolved.  Secondary to E. coli ESBL bacteremia and UTI.  Now on comfort care, pending placement for hospice    Supratherapeutic INR- (present on admission)  Assessment & Plan  INR was supratherapeutic on admission, likely secondary to liver failure. Improved.    Anemia- (present on admission)  Assessment & Plan  Comfort Care    Advance care planning- (present on admission)  Assessment & Plan  CODE STATUS was obtained. Patient is DNR/DNI. On comfort care.    Thrombocytopenia (HCC)- (present on admission)  Assessment & Plan  Likely secondary to liver cirrhosis.  On comfort care, awaiting placement for hospice.    Severe protein-calorie malnutrition (HCC)- (present on admission)  Assessment & Plan  On comfort care    Dilated cardiomyopathy (HCC)- (present on admission)  Assessment & Plan  Secondary to substance abuse.  Echocardiogram from 4/11/2022 showed LVEF of ~ 15-20%. On comfort Care    Methamphetamine abuse (HCC)- (present on admission)  Assessment & Plan  On comfort care  Counseled about lifestyle changes.    Liver cirrhosis (HCC)- (present on admission)  Assessment & Plan  Latest meld score was about 40.  Comfort care       VTE prophylaxis: pharmacologic prophylaxis contraindicated due to Comfort care    I have performed a physical exam and reviewed and updated ROS and Plan today (6/10/2022). In review of yesterday's note (6/9/2022), there are no changes except as  documented above.

## 2022-06-10 NOTE — CARE PLAN
The patient is Stable - Low risk of patient condition declining or worsening    Shift Goals  Clinical Goals: safety, manage pain  Patient Goals: rest  Family Goals: na    Progress made toward(s) clinical / shift goals:    Problem: Knowledge Deficit - Standard  Goal: Patient and family/care givers will demonstrate understanding of plan of care, disease process/condition, diagnostic tests and medications  Outcome: Progressing     Problem: Pain - Standard  Goal: Alleviation of pain or a reduction in pain to the patient’s comfort goal  Outcome: Progressing  Pt updated on plan of care. Pt encouraged to ask questions and questions were answered. Call light within reach. Pt reminded to use call light whenever needing assistance.   Pt medicated for pain per MAR, will continue to reassess pain and medicate as needed. Pt resting comfortably in bed with no other complaints.

## 2022-06-11 PROCEDURE — A9270 NON-COVERED ITEM OR SERVICE: HCPCS | Performed by: STUDENT IN AN ORGANIZED HEALTH CARE EDUCATION/TRAINING PROGRAM

## 2022-06-11 PROCEDURE — 700102 HCHG RX REV CODE 250 W/ 637 OVERRIDE(OP): Performed by: INTERNAL MEDICINE

## 2022-06-11 PROCEDURE — 700102 HCHG RX REV CODE 250 W/ 637 OVERRIDE(OP): Performed by: STUDENT IN AN ORGANIZED HEALTH CARE EDUCATION/TRAINING PROGRAM

## 2022-06-11 PROCEDURE — 700102 HCHG RX REV CODE 250 W/ 637 OVERRIDE(OP)

## 2022-06-11 PROCEDURE — A9270 NON-COVERED ITEM OR SERVICE: HCPCS | Performed by: INTERNAL MEDICINE

## 2022-06-11 PROCEDURE — 770004 HCHG ROOM/CARE - ONCOLOGY PRIVATE *

## 2022-06-11 PROCEDURE — 99231 SBSQ HOSP IP/OBS SF/LOW 25: CPT | Performed by: INTERNAL MEDICINE

## 2022-06-11 PROCEDURE — A9270 NON-COVERED ITEM OR SERVICE: HCPCS

## 2022-06-11 RX ADMIN — LORAZEPAM 0.5 MG: 0.5 TABLET ORAL at 20:21

## 2022-06-11 RX ADMIN — OXYCODONE HYDROCHLORIDE 10 MG: 10 TABLET ORAL at 14:35

## 2022-06-11 RX ADMIN — OXYCODONE HYDROCHLORIDE 10 MG: 10 TABLET ORAL at 20:21

## 2022-06-11 RX ADMIN — OXYCODONE HYDROCHLORIDE 10 MG: 10 TABLET ORAL at 08:35

## 2022-06-11 RX ADMIN — FUROSEMIDE 20 MG: 20 TABLET ORAL at 05:07

## 2022-06-11 ASSESSMENT — PAIN DESCRIPTION - PAIN TYPE
TYPE: CHRONIC PAIN
TYPE: CHRONIC PAIN
TYPE: ACUTE PAIN
TYPE: CHRONIC PAIN

## 2022-06-11 ASSESSMENT — ENCOUNTER SYMPTOMS
WEAKNESS: 0
SHORTNESS OF BREATH: 0
DIARRHEA: 0
CHILLS: 0
DEPRESSION: 0
DIZZINESS: 0
CONSTIPATION: 0
BACK PAIN: 1
FEVER: 0
NAUSEA: 0
NERVOUS/ANXIOUS: 0
VOMITING: 0
PALPITATIONS: 0
ABDOMINAL PAIN: 0
HEADACHES: 0
COUGH: 0
BLURRED VISION: 0
SORE THROAT: 0

## 2022-06-11 NOTE — CARE PLAN
The patient is Stable - Low risk of patient condition declining or worsening    Shift Goals  Clinical Goals: pain control, comfort  Patient Goals: pain control, rest  Family Goals: MORALES    Progress made toward(s) clinical / shift goals:    Problem: Knowledge Deficit - Standard  Goal: Patient and family/care givers will demonstrate understanding of plan of care, disease process/condition, diagnostic tests and medications  Note: Educated pt on POC, pain control, comfort care measure, safety,  DC planning, all questions answered     Problem: Pain - Standard  Goal: Alleviation of pain or a reduction in pain to the patient’s comfort goal  Note: Medicated with oxy 10 per MAR with adequate pain control, hourly rounding in progress       Patient is not progressing towards the following goals:

## 2022-06-11 NOTE — CARE PLAN
Problem: Pain - Standard  Goal: Alleviation of pain or a reduction in pain to the patient’s comfort goal  Outcome: Not Met     Problem: Knowledge Deficit - Standard  Goal: Patient and family/care givers will demonstrate understanding of plan of care, disease process/condition, diagnostic tests and medications  Outcome: Progressing     Problem: Fall Risk  Goal: Patient will remain free from falls  Outcome: Progressing   The patient is stable  Shift Goals  Clinical Goals: pain control  Patient Goals: sleep  Family Goals: MORALES    Progress made toward(s) clinical / shift goals:  pain lessened  Patient is not progressing towards the following goals:      Problem: Pain - Standard  Goal: Alleviation of pain or a reduction in pain to the patient’s comfort goal  Outcome: Not Met

## 2022-06-11 NOTE — PROGRESS NOTES
Report received. Assumed care. Pt in bed awake. A/O x4. VSS. Responds appropriately. C/O pain, medicated per MAR, no SOB. Assessment complete. Discussed POC, , pt verbalizes understanding. Explained importance of calling before getting OOB. Call light and belongings within reach. Bed alarm on. Bed in the lowest position. Treaded socks in place. Hourly rounding in progress. Will continue to monitor .

## 2022-06-11 NOTE — PROGRESS NOTES
Hospital Medicine Daily Progress Note    Date of Service  6/11/2022    Chief Complaint  Maral Herrera is a 56 y.o. female admitted 5/14/2022 with epistaxis    Hospital Course  No notes on file    Ms. Maral Herrera is a 56 y.o. female with history of brain tumor, polysubstance abuse (methamphetamine, alcohol, marijuana), heart failure with reduced ejection fraction of 15-20% assumed to be secondary to methamphetamine abuse, cirrhosis status post TIPS, history of blood clots on rivaroxaban who presented on 5/14/2022 with epistaxis, shock, supratherapeutic INR.  Anticoagulation was reversed with Kcentra.  Rivaroxaban was held.  Cultures grew ESBL E. coli bacteremia as well as in the urine culture.  The patient did have septic shock which resolved with fluids and antibiotics.  Infectious disease was following.  She completed course of meropenem.  Due to the patient's multiple underlying conditions including brain tumor, severe cardiomyopathy with LVEF of 15%, cirrhosis (meld score of about 40), resulting in guarded prognosis, patient decided to pursue hospice with transition to comfort care.      Interval Problem Update  Patient was seen and examined at bedside.  I have personally reviewed and interpreted vitals, labs, and imaging.    6/7.  Afebrile.  Stable vitals.  On room air.  Patient is sleepy and lethargic.  Reports no acute complaints.  Awaiting placement with hospice.  Accepted by [facility].  Discussed with case management.  Disposition possibly home with caretaker Azar vs SNF  6/8.  Afebrile.  Stable vitals.  On room air.  Patient is very lethargic.  No acute complaints.  Mother would like to pay SNF and become POA.  Apparently patient previously said did not discuss case with mother.  She also has a caretaker Elicia who said that she did take the patient home in a few days but has not been answering phone calls.  Apparently her caretaker also takes care of a significant other  6/9.  Afebrile.  Has been  hypotensive.  On room air.  Patient slightly more awake today.  She is alert and oriented.  Denies fevers, chills, chest pains, shortness of breath.  Patient states that she would rather go home with her caretaker Elicia rather than being put in a SNF by her mom.  6/10.  Afebrile.  Stable vitals.  On room air.  Patient much more awake today.  Alert and oriented.  Friend is at bedside.  Denies fever, chills, chest pains, shortness of breath.  She does report some low back pain, which she states is chronic.  Patient would like to go home with caretaker Elicia who we have not been able to get a hold of.  6/11.  Afebrile.  Stable vitals.  On room air.  Awake and alert.  Alert and oriented x3.  Denies fever, chills, chest pains, shortness of breath.  Complains of low back pain which has been chronic.  Still prefers to go home with Elicia.  Has not been able to get in touch with her but she will try again today.    I have personally seen and examined the patient at bedside. I discussed the plan of care with patient, bedside RN and .    Consultants/Specialty  infectious disease, palliative care and psychiatry    Code Status  DNAR/DNI    Disposition  Patient is medically cleared for discharge.   Anticipate discharge to to hospice.  I have placed the appropriate orders for post-discharge needs.    Review of Systems  Review of Systems   Constitutional: Negative for chills and fever.   HENT: Negative for congestion and sore throat.    Eyes: Negative for blurred vision.   Respiratory: Negative for cough and shortness of breath.    Cardiovascular: Negative for chest pain, palpitations and leg swelling.   Gastrointestinal: Negative for abdominal pain, constipation, diarrhea, nausea and vomiting.   Genitourinary: Negative for dysuria, frequency and urgency.   Musculoskeletal: Positive for back pain.   Skin: Negative for rash.   Neurological: Negative for dizziness, weakness and headaches.   Psychiatric/Behavioral:  Negative for depression. The patient is not nervous/anxious.    All other systems reviewed and are negative.       Physical Exam  Temp:  [36.4 °C (97.6 °F)-37.2 °C (98.9 °F)] 36.6 °C (97.9 °F)  Pulse:  [60-66] 60  Resp:  [16-17] 17  BP: (101-121)/(61-72) 105/66  SpO2:  [90 %-94 %] 94 %    Physical Exam  Vitals and nursing note reviewed.   Constitutional:       General: She is sleeping.      Appearance: She is ill-appearing.   HENT:      Head: Normocephalic and atraumatic.      Right Ear: External ear normal.      Left Ear: External ear normal.      Nose: Nose normal.      Mouth/Throat:      Mouth: Mucous membranes are dry.      Pharynx: Oropharynx is clear.   Eyes:      Extraocular Movements: Extraocular movements intact.      Conjunctiva/sclera: Conjunctivae normal.   Cardiovascular:      Rate and Rhythm: Normal rate and regular rhythm.      Pulses: Normal pulses.      Heart sounds: Normal heart sounds. No murmur heard.  Pulmonary:      Effort: Pulmonary effort is normal. No respiratory distress.      Breath sounds: Normal breath sounds. No stridor. No wheezing or rales.   Abdominal:      General: Abdomen is flat. Bowel sounds are normal. There is no distension.      Palpations: Abdomen is soft. There is no mass.      Tenderness: There is no abdominal tenderness.   Musculoskeletal:      Cervical back: Normal range of motion.   Skin:     General: Skin is warm.      Capillary Refill: Capillary refill takes less than 2 seconds.   Neurological:      General: No focal deficit present.      Mental Status: She is oriented to person, place, and time. Mental status is at baseline. She is lethargic.      Cranial Nerves: No cranial nerve deficit.   Psychiatric:      Comments: Difficult to assess as patient is minimally verbal.         Fluids    Intake/Output Summary (Last 24 hours) at 6/11/2022 0916  Last data filed at 6/10/2022 2200  Gross per 24 hour   Intake 240 ml   Output --   Net 240 ml       Laboratory                         Imaging  DX-CHEST-PORTABLE (1 VIEW)   Final Result      No radiographic evidence of acute pulmonary tuberculosis.      DX-CHEST-PORTABLE (1 VIEW)   Final Result         1.  No acute cardiopulmonary disease.   2.  Cardiomegaly   3.  Atherosclerosis           Assessment/Plan  * Epistaxis- (present on admission)  Assessment & Plan  Resolved.  INR was elevated on admission.  On comfort care    Bacteremia  Assessment & Plan  Completed antibiotic course for ESBL E.Coli bacteremia. 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 Awaiting Hospice arrangements    Acute cystitis without hematuria  Assessment & Plan  Completed antibiotic course    Hyponatremia- (present on admission)  Assessment & Plan  Secondary to cirrhosis.  On comfort care     Septic shock (HCC)  Assessment & Plan  Resolved.  Secondary to E. coli ESBL bacteremia and UTI.  Now on comfort care, pending placement for hospice    Supratherapeutic INR- (present on admission)  Assessment & Plan  INR was supratherapeutic on admission, likely secondary to liver failure. Improved.    Anemia- (present on admission)  Assessment & Plan  Comfort Care    Advance care planning- (present on admission)  Assessment & Plan  CODE STATUS was obtained. Patient is DNR/DNI. On comfort care.    Thrombocytopenia (HCC)- (present on admission)  Assessment & Plan  Likely secondary to liver cirrhosis.  On comfort care, awaiting placement for hospice.    Severe protein-calorie malnutrition (HCC)- (present on admission)  Assessment & Plan  On comfort care    Dilated cardiomyopathy (HCC)- (present on admission)  Assessment & Plan  Secondary to substance abuse.  Echocardiogram from 4/11/2022 showed LVEF of ~ 15-20%. On comfort Care    Methamphetamine abuse (HCC)- (present on admission)  Assessment & Plan  On comfort care  Counseled about lifestyle changes.    Liver cirrhosis (HCC)- (present on admission)  Assessment &  Plan  Latest meld score was about 40.  Comfort care       VTE prophylaxis: pharmacologic prophylaxis contraindicated due to Comfort care    I have performed a physical exam and reviewed and updated ROS and Plan today (6/11/2022). In review of yesterday's note (6/10/2022), there are no changes except as documented above.

## 2022-06-12 PROCEDURE — 770004 HCHG ROOM/CARE - ONCOLOGY PRIVATE *

## 2022-06-12 PROCEDURE — 90834 PSYTX W PT 45 MINUTES: CPT | Performed by: SOCIAL WORKER

## 2022-06-12 PROCEDURE — 99231 SBSQ HOSP IP/OBS SF/LOW 25: CPT | Performed by: INTERNAL MEDICINE

## 2022-06-12 PROCEDURE — 700102 HCHG RX REV CODE 250 W/ 637 OVERRIDE(OP): Performed by: STUDENT IN AN ORGANIZED HEALTH CARE EDUCATION/TRAINING PROGRAM

## 2022-06-12 PROCEDURE — A9270 NON-COVERED ITEM OR SERVICE: HCPCS | Performed by: STUDENT IN AN ORGANIZED HEALTH CARE EDUCATION/TRAINING PROGRAM

## 2022-06-12 PROCEDURE — 700102 HCHG RX REV CODE 250 W/ 637 OVERRIDE(OP)

## 2022-06-12 PROCEDURE — A9270 NON-COVERED ITEM OR SERVICE: HCPCS

## 2022-06-12 RX ADMIN — OXYCODONE HYDROCHLORIDE 10 MG: 10 TABLET ORAL at 16:53

## 2022-06-12 RX ADMIN — FUROSEMIDE 20 MG: 20 TABLET ORAL at 04:25

## 2022-06-12 RX ADMIN — OXYCODONE HYDROCHLORIDE 10 MG: 10 TABLET ORAL at 22:18

## 2022-06-12 RX ADMIN — OXYCODONE HYDROCHLORIDE 10 MG: 10 TABLET ORAL at 02:13

## 2022-06-12 RX ADMIN — OXYCODONE HYDROCHLORIDE 10 MG: 10 TABLET ORAL at 08:37

## 2022-06-12 ASSESSMENT — PAIN DESCRIPTION - PAIN TYPE
TYPE: ACUTE PAIN
TYPE: CHRONIC PAIN
TYPE: ACUTE PAIN;CHRONIC PAIN

## 2022-06-12 ASSESSMENT — ENCOUNTER SYMPTOMS
NAUSEA: 0
FEVER: 0
DIARRHEA: 0
CONSTIPATION: 0
BLURRED VISION: 0
COUGH: 0
DEPRESSION: 0
CHILLS: 0
NERVOUS/ANXIOUS: 0
HEADACHES: 0
SORE THROAT: 0
PALPITATIONS: 0
DIZZINESS: 0
ABDOMINAL PAIN: 0
BACK PAIN: 1
WEAKNESS: 0
VOMITING: 0
SHORTNESS OF BREATH: 0

## 2022-06-12 NOTE — CARE PLAN
The patient is Stable - Low risk of patient condition declining or worsening    Shift Goals  Clinical Goals: comfort  Patient Goals: rest, soda  Family Goals: MORALES    Progress made toward(s) clinical / shift goals:    Problem: Knowledge Deficit - Standard  Goal: Patient and family/care givers will demonstrate understanding of plan of care, disease process/condition, diagnostic tests and medications  Outcome: Progressing     Problem: Pain - Standard  Goal: Alleviation of pain or a reduction in pain to the patient’s comfort goal  Outcome: Progressing     Problem: Skin Integrity  Goal: Skin integrity is maintained or improved  Outcome: Progressing     Problem: Fall Risk  Goal: Patient will remain free from falls  Outcome: Progressing     Problem: Discharge Barriers/Planning  Goal: Patient's continuum of care needs are met  Outcome: Progressing     Problem: Mobility  Goal: Patient's capacity to carry out activities will improve  Outcome: Progressing     Problem: Self Care  Goal: Patient will have the ability to perform ADLs independently or with assistance (bathe, groom, dress, toilet and feed)  Outcome: Progressing     Problem: Knowledge Deficit - Comfort Care  Goal: Patient and family/care givers will demonstrate understanding of dying process and grieving  Outcome: Progressing       Pt A&Ox3, disoriented to time and intermittently forgetful. VS: /79   Pulse 93   Temp 36.8 °C (98.3 °F) (Temporal)   Resp 17   Wt 72.3 kg (159 lb 6.3 oz)   SpO2 90%   BMI 25.73 kg/m² . Pt denies n/v, numbness, tingling, SOB and chest pain. Pain level 7/10, prn pain medication administered per MAR. Purewick in place for incontinence. Pt needs met at this time, call light within reach, hourly rounding in effect, and will continue to monitor.

## 2022-06-12 NOTE — CARE PLAN
Problem: Pain - Standard  Goal: Alleviation of pain or a reduction in pain to the patient’s comfort goal  Outcome: Progressing  Note: Patient c/o pain at back with some relief from prn pain med.     Problem: Skin Integrity  Goal: Skin integrity is maintained or improved  Outcome: Progressing  Note: No new skin breakdown noted, patient moves well in bed.     Problem: Fall Risk  Goal: Patient will remain free from falls  Outcome: Progressing  Note: Safety and fall precautions observed, bed alarm on.   The patient is Stable - Low risk of patient condition declining or worsening    Shift Goals  Clinical Goals: comfort; safety  Patient Goals: rest  Family Goals: MORALES    Progress made toward(s) clinical / shift goals:  VSS with no signs of respiratory distress at room air, comfort care as ordered, denies chest pain, denies nausea, contact isolation observed for esbl.    Patient is not progressing towards the following goals:

## 2022-06-12 NOTE — PROGRESS NOTES
Hospital Medicine Daily Progress Note    Date of Service  6/12/2022    Chief Complaint  Maral Herrera is a 56 y.o. female admitted 5/14/2022 with epistaxis    Hospital Course  No notes on file    Ms. Maral Herrera is a 56 y.o. female with history of brain tumor, polysubstance abuse (methamphetamine, alcohol, marijuana), heart failure with reduced ejection fraction of 15-20% assumed to be secondary to methamphetamine abuse, cirrhosis status post TIPS, history of blood clots on rivaroxaban who presented on 5/14/2022 with epistaxis, shock, supratherapeutic INR.  Anticoagulation was reversed with Kcentra.  Rivaroxaban was held.  Cultures grew ESBL E. coli bacteremia as well as in the urine culture.  The patient did have septic shock which resolved with fluids and antibiotics.  Infectious disease was following.  She completed course of meropenem.  Due to the patient's multiple underlying conditions including brain tumor, severe cardiomyopathy with LVEF of 15%, cirrhosis (meld score of about 40), resulting in guarded prognosis, patient decided to pursue hospice with transition to comfort care.      Interval Problem Update  Patient was seen and examined at bedside.  I have personally reviewed and interpreted vitals, labs, and imaging.    6/7.  Afebrile.  Stable vitals.  On room air.  Patient is sleepy and lethargic.  Reports no acute complaints.  Awaiting placement with hospice.  Accepted by [facility].  Discussed with case management.  Disposition possibly home with caretaker Azar vs SNF  6/8.  Afebrile.  Stable vitals.  On room air.  Patient is very lethargic.  No acute complaints.  Mother would like to pay SNF and become POA.  Apparently patient previously said did not discuss case with mother.  She also has a caretaker Elicia who said that she did take the patient home in a few days but has not been answering phone calls.  Apparently her caretaker also takes care of a significant other  6/9.  Afebrile.  Has been  hypotensive.  On room air.  Patient slightly more awake today.  She is alert and oriented.  Denies fevers, chills, chest pains, shortness of breath.  Patient states that she would rather go home with her caretaker Elicia rather than being put in a SNF by her mom.  6/10.  Afebrile.  Stable vitals.  On room air.  Patient much more awake today.  Alert and oriented.  Friend is at bedside.  Denies fever, chills, chest pains, shortness of breath.  She does report some low back pain, which she states is chronic.  Patient would like to go home with caretaker Elicia who we have not been able to get a hold of.  6/11.  Afebrile.  Stable vitals.  On room air.  Awake and alert.  Alert and oriented x3.  Denies fever, chills, chest pains, shortness of breath.  Complains of low back pain which has been chronic.  Still prefers to go home with Elicia.  Has not been able to get in touch with her but she will try again today.  6/12.  Afebrile.  Stable vitals.  On room air.  Denies fevers, chills, chest pains, shortness of breath.  Reports chronic back pain.  Patient was able to get a hold of Elicia.  She reports that Elicia her caretaker is setting up in room for her and one of her other roommates is moving out so the patient can move in hopefully soon    I have personally seen and examined the patient at bedside. I discussed the plan of care with patient, bedside RN and .    Consultants/Specialty  infectious disease, palliative care and psychiatry    Code Status  DNAR/DNI    Disposition  Patient is medically cleared for discharge.   Anticipate discharge to to hospice.  I have placed the appropriate orders for post-discharge needs.    Review of Systems  Review of Systems   Constitutional: Negative for chills and fever.   HENT: Negative for congestion and sore throat.    Eyes: Negative for blurred vision.   Respiratory: Negative for cough and shortness of breath.    Cardiovascular: Negative for chest pain, palpitations and leg  swelling.   Gastrointestinal: Negative for abdominal pain, constipation, diarrhea, nausea and vomiting.   Genitourinary: Negative for dysuria, frequency and urgency.   Musculoskeletal: Positive for back pain.   Skin: Negative for rash.   Neurological: Negative for dizziness, weakness and headaches.   Psychiatric/Behavioral: Negative for depression. The patient is not nervous/anxious.    All other systems reviewed and are negative.       Physical Exam  Temp:  [36.6 °C (97.9 °F)-37 °C (98.6 °F)] 36.6 °C (97.9 °F)  Pulse:  [60-78] 78  Resp:  [16-18] 16  BP: (105-116)/(66-83) 116/81  SpO2:  [90 %-94 %] 90 %    Physical Exam  Vitals and nursing note reviewed.   Constitutional:       General: She is sleeping.      Appearance: She is ill-appearing.   HENT:      Head: Normocephalic and atraumatic.      Right Ear: External ear normal.      Left Ear: External ear normal.      Nose: Nose normal.      Mouth/Throat:      Mouth: Mucous membranes are dry.      Pharynx: Oropharynx is clear.   Eyes:      Extraocular Movements: Extraocular movements intact.      Conjunctiva/sclera: Conjunctivae normal.   Cardiovascular:      Rate and Rhythm: Normal rate and regular rhythm.      Pulses: Normal pulses.      Heart sounds: Normal heart sounds. No murmur heard.  Pulmonary:      Effort: Pulmonary effort is normal. No respiratory distress.      Breath sounds: Normal breath sounds. No stridor. No wheezing or rales.   Abdominal:      General: Abdomen is flat. Bowel sounds are normal. There is no distension.      Palpations: Abdomen is soft. There is no mass.      Tenderness: There is no abdominal tenderness.   Musculoskeletal:      Cervical back: Normal range of motion.   Skin:     General: Skin is warm.      Capillary Refill: Capillary refill takes less than 2 seconds.   Neurological:      General: No focal deficit present.      Mental Status: She is oriented to person, place, and time. Mental status is at baseline. She is lethargic.       Cranial Nerves: No cranial nerve deficit.   Psychiatric:      Comments: Difficult to assess as patient is minimally verbal.         Fluids    Intake/Output Summary (Last 24 hours) at 6/12/2022 0613  Last data filed at 6/12/2022 0214  Gross per 24 hour   Intake 840 ml   Output 1200 ml   Net -360 ml       Laboratory                        Imaging  DX-CHEST-PORTABLE (1 VIEW)   Final Result      No radiographic evidence of acute pulmonary tuberculosis.      DX-CHEST-PORTABLE (1 VIEW)   Final Result         1.  No acute cardiopulmonary disease.   2.  Cardiomegaly   3.  Atherosclerosis           Assessment/Plan  * Epistaxis- (present on admission)  Assessment & Plan  Resolved.  INR was elevated on admission.  On comfort care    Bacteremia  Assessment & Plan  Completed antibiotic course for ESBL E.Coli bacteremia. 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 Awaiting Hospice arrangements    Acute cystitis without hematuria  Assessment & Plan  Completed antibiotic course    Hyponatremia- (present on admission)  Assessment & Plan  Secondary to cirrhosis.  On comfort care     Septic shock (HCC)  Assessment & Plan  Resolved.  Secondary to E. coli ESBL bacteremia and UTI.  Now on comfort care, pending placement for hospice    Supratherapeutic INR- (present on admission)  Assessment & Plan  INR was supratherapeutic on admission, likely secondary to liver failure. Improved.    Anemia- (present on admission)  Assessment & Plan  Comfort Care    Advance care planning- (present on admission)  Assessment & Plan  CODE STATUS was obtained. Patient is DNR/DNI. On comfort care.    Thrombocytopenia (HCC)- (present on admission)  Assessment & Plan  Likely secondary to liver cirrhosis.  On comfort care, awaiting placement for hospice.    Severe protein-calorie malnutrition (HCC)- (present on admission)  Assessment & Plan  On comfort care    Dilated cardiomyopathy (HCC)-  (present on admission)  Assessment & Plan  Secondary to substance abuse.  Echocardiogram from 4/11/2022 showed LVEF of ~ 15-20%. On comfort Care    Methamphetamine abuse (HCC)- (present on admission)  Assessment & Plan  On comfort care  Counseled about lifestyle changes.    Liver cirrhosis (HCC)- (present on admission)  Assessment & Plan  Latest meld score was about 40.  Comfort care       VTE prophylaxis: pharmacologic prophylaxis contraindicated due to Comfort care    I have performed a physical exam and reviewed and updated ROS and Plan today (6/12/2022). In review of yesterday's note (6/11/2022), there are no changes except as documented above.

## 2022-06-13 PROCEDURE — A9270 NON-COVERED ITEM OR SERVICE: HCPCS | Performed by: STUDENT IN AN ORGANIZED HEALTH CARE EDUCATION/TRAINING PROGRAM

## 2022-06-13 PROCEDURE — 700102 HCHG RX REV CODE 250 W/ 637 OVERRIDE(OP): Performed by: INTERNAL MEDICINE

## 2022-06-13 PROCEDURE — A9270 NON-COVERED ITEM OR SERVICE: HCPCS

## 2022-06-13 PROCEDURE — A9270 NON-COVERED ITEM OR SERVICE: HCPCS | Performed by: INTERNAL MEDICINE

## 2022-06-13 PROCEDURE — 99231 SBSQ HOSP IP/OBS SF/LOW 25: CPT | Performed by: INTERNAL MEDICINE

## 2022-06-13 PROCEDURE — 700101 HCHG RX REV CODE 250: Performed by: INTERNAL MEDICINE

## 2022-06-13 PROCEDURE — 770004 HCHG ROOM/CARE - ONCOLOGY PRIVATE *

## 2022-06-13 PROCEDURE — 700102 HCHG RX REV CODE 250 W/ 637 OVERRIDE(OP): Performed by: STUDENT IN AN ORGANIZED HEALTH CARE EDUCATION/TRAINING PROGRAM

## 2022-06-13 PROCEDURE — 700102 HCHG RX REV CODE 250 W/ 637 OVERRIDE(OP)

## 2022-06-13 RX ORDER — METHOCARBAMOL 500 MG/1
500 TABLET, FILM COATED ORAL 4 TIMES DAILY
Status: DISCONTINUED | OUTPATIENT
Start: 2022-06-13 | End: 2022-06-17 | Stop reason: HOSPADM

## 2022-06-13 RX ORDER — LIDOCAINE 50 MG/G
1 PATCH TOPICAL EVERY 24 HOURS
Status: DISCONTINUED | OUTPATIENT
Start: 2022-06-13 | End: 2022-06-17 | Stop reason: HOSPADM

## 2022-06-13 RX ADMIN — SCOPALAMINE 1 PATCH: 1 PATCH, EXTENDED RELEASE TRANSDERMAL at 12:06

## 2022-06-13 RX ADMIN — FUROSEMIDE 20 MG: 20 TABLET ORAL at 05:25

## 2022-06-13 RX ADMIN — METHOCARBAMOL 500 MG: 500 TABLET ORAL at 17:06

## 2022-06-13 RX ADMIN — OXYCODONE HYDROCHLORIDE 10 MG: 10 TABLET ORAL at 05:28

## 2022-06-13 RX ADMIN — METHOCARBAMOL 500 MG: 500 TABLET ORAL at 12:06

## 2022-06-13 RX ADMIN — METHOCARBAMOL 500 MG: 500 TABLET ORAL at 21:14

## 2022-06-13 RX ADMIN — METHOCARBAMOL 500 MG: 500 TABLET ORAL at 10:02

## 2022-06-13 RX ADMIN — LIDOCAINE 1 PATCH: 50 PATCH TOPICAL at 10:03

## 2022-06-13 ASSESSMENT — ENCOUNTER SYMPTOMS
HEADACHES: 1
BLURRED VISION: 0
NERVOUS/ANXIOUS: 0
DEPRESSION: 0
WEAKNESS: 0
NAUSEA: 0
SHORTNESS OF BREATH: 0
DIZZINESS: 0
SORE THROAT: 0
CONSTIPATION: 0
BACK PAIN: 1
FEVER: 0
CHILLS: 0
VOMITING: 0
DIARRHEA: 0
PALPITATIONS: 0
COUGH: 0
ABDOMINAL PAIN: 0

## 2022-06-13 ASSESSMENT — PAIN DESCRIPTION - PAIN TYPE: TYPE: ACUTE PAIN

## 2022-06-13 NOTE — PROGRESS NOTES
Hospital Medicine Daily Progress Note    Date of Service  6/13/2022    Chief Complaint  Maral Herrera is a 56 y.o. female admitted 5/14/2022 with epistaxis    Hospital Course  No notes on file    Ms. Maral Herrera is a 56 y.o. female with history of brain tumor, polysubstance abuse (methamphetamine, alcohol, marijuana), heart failure with reduced ejection fraction of 15-20% assumed to be secondary to methamphetamine abuse, cirrhosis status post TIPS, history of blood clots on rivaroxaban who presented on 5/14/2022 with epistaxis, shock, supratherapeutic INR.  Anticoagulation was reversed with Kcentra.  Rivaroxaban was held.  Cultures grew ESBL E. coli bacteremia as well as in the urine culture.  The patient did have septic shock which resolved with fluids and antibiotics.  Infectious disease was following.  She completed course of meropenem.  Due to the patient's multiple underlying conditions including brain tumor, severe cardiomyopathy with LVEF of 15%, cirrhosis (meld score of about 40), resulting in guarded prognosis, patient decided to pursue hospice with transition to comfort care.      Interval Problem Update  Patient was seen and examined at bedside.  I have personally reviewed and interpreted vitals, labs, and imaging.    6/7.  Afebrile.  Stable vitals.  On room air.  Patient is sleepy and lethargic.  Reports no acute complaints.  Awaiting placement with hospice.  Accepted by [facility].  Discussed with case management.  Disposition possibly home with caretaker Azar vs SNF  6/8.  Afebrile.  Stable vitals.  On room air.  Patient is very lethargic.  No acute complaints.  Mother would like to pay SNF and become POA.  Apparently patient previously said did not discuss case with mother.  She also has a caretaker Elicia who said that she did take the patient home in a few days but has not been answering phone calls.  Apparently her caretaker also takes care of a significant other  6/9.  Afebrile.  Has been  hypotensive.  On room air.  Patient slightly more awake today.  She is alert and oriented.  Denies fevers, chills, chest pains, shortness of breath.  Patient states that she would rather go home with her caretaker Eilcia rather than being put in a SNF by her mom.  6/10.  Afebrile.  Stable vitals.  On room air.  Patient much more awake today.  Alert and oriented.  Friend is at bedside.  Denies fever, chills, chest pains, shortness of breath.  She does report some low back pain, which she states is chronic.  Patient would like to go home with caretaker Elicia who we have not been able to get a hold of.  6/11.  Afebrile.  Stable vitals.  On room air.  Awake and alert.  Alert and oriented x3.  Denies fever, chills, chest pains, shortness of breath.  Complains of low back pain which has been chronic.  Still prefers to go home with Elicia.  Has not been able to get in touch with her but she will try again today.  6/12.  Afebrile.  Stable vitals.  On room air.  Denies fevers, chills, chest pains, shortness of breath.  Reports chronic back pain.  Patient was able to get a hold of Elicia.  She reports that Elicia her caretaker is setting up in room for her and one of her other roommates is moving out so the patient can move in hopefully soon  6/13.  Afebrile.  Stable vitals.  On room air.  Denies fevers, chills, chest pains, shortness of breath.  Complains of back pain and headache.  Started on Lidoderm patch and muscle relaxants.  Patient prefers to go home with Elicia but it may take 2 weeks home to be set up.    I have personally seen and examined the patient at bedside. I discussed the plan of care with patient, bedside RN and .    Consultants/Specialty  infectious disease, palliative care and psychiatry    Code Status  DNAR/DNI    Disposition 20  Patient is medically cleared for discharge.   Anticipate discharge to to hospice.  I have placed the appropriate orders for post-discharge needs.    Review of  Systems  Review of Systems   Constitutional: Negative for chills and fever.   HENT: Negative for congestion and sore throat.    Eyes: Negative for blurred vision.   Respiratory: Negative for cough and shortness of breath.    Cardiovascular: Negative for chest pain, palpitations and leg swelling.   Gastrointestinal: Negative for abdominal pain, constipation, diarrhea, nausea and vomiting.   Genitourinary: Negative for dysuria, frequency and urgency.   Musculoskeletal: Positive for back pain.   Skin: Negative for rash.   Neurological: Positive for headaches. Negative for dizziness and weakness.   Psychiatric/Behavioral: Negative for depression. The patient is not nervous/anxious.    All other systems reviewed and are negative.       Physical Exam  Temp:  [36.7 °C (98 °F)-36.9 °C (98.4 °F)] 36.9 °C (98.4 °F)  Pulse:  [64-93] 80  Resp:  [17-18] 18  BP: (112-125)/(75-79) 119/75  SpO2:  [90 %-96 %] 91 %    Physical Exam  Vitals and nursing note reviewed.   Constitutional:       General: She is sleeping.      Appearance: She is ill-appearing.   HENT:      Head: Normocephalic and atraumatic.      Right Ear: External ear normal.      Left Ear: External ear normal.      Nose: Nose normal.      Mouth/Throat:      Mouth: Mucous membranes are dry.      Pharynx: Oropharynx is clear.   Eyes:      Extraocular Movements: Extraocular movements intact.      Conjunctiva/sclera: Conjunctivae normal.   Cardiovascular:      Rate and Rhythm: Normal rate and regular rhythm.      Pulses: Normal pulses.      Heart sounds: Normal heart sounds. No murmur heard.  Pulmonary:      Effort: Pulmonary effort is normal. No respiratory distress.      Breath sounds: Normal breath sounds. No stridor. No wheezing or rales.   Abdominal:      General: Abdomen is flat. Bowel sounds are normal. There is no distension.      Palpations: Abdomen is soft. There is no mass.      Tenderness: There is no abdominal tenderness.   Musculoskeletal:      Cervical back:  Normal range of motion.   Skin:     General: Skin is warm.      Capillary Refill: Capillary refill takes less than 2 seconds.   Neurological:      General: No focal deficit present.      Mental Status: She is oriented to person, place, and time. Mental status is at baseline. She is lethargic.      Cranial Nerves: No cranial nerve deficit.   Psychiatric:      Comments: Difficult to assess as patient is minimally verbal.         Fluids    Intake/Output Summary (Last 24 hours) at 6/13/2022 0619  Last data filed at 6/13/2022 0337  Gross per 24 hour   Intake 1470 ml   Output 450 ml   Net 1020 ml       Laboratory                        Imaging  DX-CHEST-PORTABLE (1 VIEW)   Final Result      No radiographic evidence of acute pulmonary tuberculosis.      DX-CHEST-PORTABLE (1 VIEW)   Final Result         1.  No acute cardiopulmonary disease.   2.  Cardiomegaly   3.  Atherosclerosis           Assessment/Plan  * Epistaxis- (present on admission)  Assessment & Plan  Resolved.  INR was elevated on admission.  On comfort care    Bacteremia  Assessment & Plan  Completed antibiotic course for ESBL E.Coli bacteremia. 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 Awaiting Hospice arrangements    Acute cystitis without hematuria  Assessment & Plan  Completed antibiotic course    Hyponatremia- (present on admission)  Assessment & Plan  Secondary to cirrhosis.  On comfort care     Septic shock (HCC)  Assessment & Plan  Resolved.  Secondary to E. coli ESBL bacteremia and UTI.  Now on comfort care, pending placement for hospice    Supratherapeutic INR- (present on admission)  Assessment & Plan  INR was supratherapeutic on admission, likely secondary to liver failure. Improved.    Anemia- (present on admission)  Assessment & Plan  Comfort Care    Advance care planning- (present on admission)  Assessment & Plan  CODE STATUS was obtained. Patient is DNR/DNI. On comfort  care.    Thrombocytopenia (HCC)- (present on admission)  Assessment & Plan  Likely secondary to liver cirrhosis.  On comfort care, awaiting placement for hospice.    Severe protein-calorie malnutrition (HCC)- (present on admission)  Assessment & Plan  On comfort care    Dilated cardiomyopathy (HCC)- (present on admission)  Assessment & Plan  Secondary to substance abuse.  Echocardiogram from 4/11/2022 showed LVEF of ~ 15-20%. On comfort Care    Methamphetamine abuse (HCC)- (present on admission)  Assessment & Plan  On comfort care  Counseled about lifestyle changes.    Liver cirrhosis (HCC)- (present on admission)  Assessment & Plan  Latest meld score was about 40.  Comfort care       VTE prophylaxis: pharmacologic prophylaxis contraindicated due to Comfort care    I have performed a physical exam and reviewed and updated ROS and Plan today (6/13/2022). In review of yesterday's note (6/12/2022), there are no changes except as documented above.

## 2022-06-13 NOTE — CARE PLAN
The patient is Stable - Low risk of patient condition declining or worsening    Shift Goals  Clinical Goals: comfort  Patient Goals: rest, soda  Family Goals: MORALES    Progress made toward(s) clinical / shift goals:    Problem: Knowledge Deficit - Standard  Goal: Patient and family/care givers will demonstrate understanding of plan of care, disease process/condition, diagnostic tests and medications  Outcome: Progressing     Problem: Pain - Standard  Goal: Alleviation of pain or a reduction in pain to the patient’s comfort goal  Outcome: Progressing     Problem: Skin Integrity  Goal: Skin integrity is maintained or improved  Outcome: Progressing     Problem: Fall Risk  Goal: Patient will remain free from falls  Outcome: Progressing     Problem: Discharge Barriers/Planning  Goal: Patient's continuum of care needs are met  Outcome: Progressing     Problem: Mobility  Goal: Patient's capacity to carry out activities will improve  Outcome: Progressing     Problem: Self Care  Goal: Patient will have the ability to perform ADLs independently or with assistance (bathe, groom, dress, toilet and feed)  Outcome: Progressing     Problem: Knowledge Deficit - Comfort Care  Goal: Patient and family/care givers will demonstrate understanding of dying process and grieving  Outcome: Progressing       Patient is not progressing towards the following goals:

## 2022-06-13 NOTE — CONSULTS
"Brief Behavioral Health Care Note:    Length of Intervention: 45 Minutes    HPI: Maral Herrera is a 56 year old female with history of brain tumor, polysubstance abuse (methamphetamine, alcohol, marijuana), heart failure with reduced ejection fraction of 15-20% assumed to be secondary to methamphetamine abuse, cirrhosis status post TIPS, history of blood clots on rivaroxaban who presented on 5/14/2022 with epistaxis, shock, supratherapeutic INR per medical record.  Medical records further indicates that due to the patient's multiple underlying conditions including brain tumor, severe cardiomyopathy with LVEF of 15%, cirrhosis (meld score of about 40), resulting in guarded prognosis, patient decided to pursue hospice with transition to comfort care.    Psychotherapy Session Summary    Patient seen lying on hospital bed, alert, oriented to self and location, pleasant and easy to engage to the counseling process. Patient was tearful throughout the interviewing while discussing her medical prognosis and the idea of hospice care. Patient states, \"I'm lonely\". \"I have to make all these decisions by myself\". Patient states her family is in [place] and unfortunately are unable to visit her or provide physical support.Validated patient's effort to manage her medical situation, emotional distress and feelings of loneliness. Patient reports her ultimate plan is to move to a friends house and rent a room. Patient believes she has the funds needed since she receives social security disability, approximately $1189 per month. Patient states, \"I am afraid my insurance is going to stop paying and the hospital is going to kick me out\". Assured patient that her medical team is here to provide support and assist her in creating a good discharge plan.     Interventions  Clinician implemented Mindfulness techniques with patient. However, In the midst of the session patient reported experiencing a \"panic attack\". Patient states, \"I " "have these panic attacks when I get emotional and upset and states,  \"its hard to catch her breath\". Clinician began working with patient to manage breathing and decrease anxiety. Patient was able to follow breathing cues and regain her composure. Encouraged patient to practice these techniques any time she feels anxious.    Recommendations    Will continue to provide support and encouragement while in the hospital. If hospice follows patient at discharge, please add MSW/LCSW to the hospice order provide emotional support.      Thank you,    Izabella Bermeo, Ph.D., LCSW  "

## 2022-06-13 NOTE — CARE PLAN
The patient is Stable - Low risk of patient condition declining or worsening    Shift Goals  Clinical Goals: comfort  Patient Goals: rest, juice  Family Goals: MORALES    Progress made toward(s) clinical / shift goals:    Problem: Knowledge Deficit - Standard  Goal: Patient and family/care givers will demonstrate understanding of plan of care, disease process/condition, diagnostic tests and medications  Outcome: Progressing     Problem: Pain - Standard  Goal: Alleviation of pain or a reduction in pain to the patient’s comfort goal  Outcome: Progressing     Problem: Skin Integrity  Goal: Skin integrity is maintained or improved  Outcome: Progressing     Problem: Fall Risk  Goal: Patient will remain free from falls  Outcome: Progressing     Problem: Discharge Barriers/Planning  Goal: Patient's continuum of care needs are met  Outcome: Progressing     Problem: Mobility  Goal: Patient's capacity to carry out activities will improve  Outcome: Progressing     Problem: Self Care  Goal: Patient will have the ability to perform ADLs independently or with assistance (bathe, groom, dress, toilet and feed)  Outcome: Progressing     Problem: Knowledge Deficit - Comfort Care  Goal: Patient and family/care givers will demonstrate understanding of dying process and grieving  Outcome: Progressing

## 2022-06-14 PROCEDURE — C9803 HOPD COVID-19 SPEC COLLECT: HCPCS | Performed by: INTERNAL MEDICINE

## 2022-06-14 PROCEDURE — 700102 HCHG RX REV CODE 250 W/ 637 OVERRIDE(OP): Performed by: STUDENT IN AN ORGANIZED HEALTH CARE EDUCATION/TRAINING PROGRAM

## 2022-06-14 PROCEDURE — 700102 HCHG RX REV CODE 250 W/ 637 OVERRIDE(OP): Performed by: INTERNAL MEDICINE

## 2022-06-14 PROCEDURE — A9270 NON-COVERED ITEM OR SERVICE: HCPCS | Performed by: STUDENT IN AN ORGANIZED HEALTH CARE EDUCATION/TRAINING PROGRAM

## 2022-06-14 PROCEDURE — A9270 NON-COVERED ITEM OR SERVICE: HCPCS

## 2022-06-14 PROCEDURE — A9270 NON-COVERED ITEM OR SERVICE: HCPCS | Performed by: INTERNAL MEDICINE

## 2022-06-14 PROCEDURE — 0240U HCHG SARS-COV-2 COVID-19 NFCT DS RESP RNA 3 TRGT MIC: CPT

## 2022-06-14 PROCEDURE — 700102 HCHG RX REV CODE 250 W/ 637 OVERRIDE(OP)

## 2022-06-14 PROCEDURE — 700101 HCHG RX REV CODE 250: Performed by: INTERNAL MEDICINE

## 2022-06-14 PROCEDURE — 99231 SBSQ HOSP IP/OBS SF/LOW 25: CPT | Performed by: INTERNAL MEDICINE

## 2022-06-14 PROCEDURE — 770004 HCHG ROOM/CARE - ONCOLOGY PRIVATE *

## 2022-06-14 RX ADMIN — LIDOCAINE 1 PATCH: 50 PATCH TOPICAL at 08:58

## 2022-06-14 RX ADMIN — METHOCARBAMOL 500 MG: 500 TABLET ORAL at 16:55

## 2022-06-14 RX ADMIN — OXYCODONE HYDROCHLORIDE 10 MG: 10 TABLET ORAL at 19:38

## 2022-06-14 RX ADMIN — METHOCARBAMOL 500 MG: 500 TABLET ORAL at 21:02

## 2022-06-14 RX ADMIN — METHOCARBAMOL 500 MG: 500 TABLET ORAL at 12:47

## 2022-06-14 RX ADMIN — METHOCARBAMOL 500 MG: 500 TABLET ORAL at 08:58

## 2022-06-14 RX ADMIN — OXYCODONE 5 MG: 5 TABLET ORAL at 05:14

## 2022-06-14 RX ADMIN — OXYCODONE HYDROCHLORIDE 10 MG: 10 TABLET ORAL at 23:05

## 2022-06-14 RX ADMIN — FUROSEMIDE 20 MG: 20 TABLET ORAL at 05:14

## 2022-06-14 ASSESSMENT — PAIN DESCRIPTION - PAIN TYPE
TYPE: ACUTE PAIN

## 2022-06-14 ASSESSMENT — ENCOUNTER SYMPTOMS
BLURRED VISION: 0
NERVOUS/ANXIOUS: 0
COUGH: 0
HEADACHES: 1
ABDOMINAL PAIN: 0
NAUSEA: 0
FEVER: 0
SHORTNESS OF BREATH: 0
PALPITATIONS: 0
WEAKNESS: 0
CONSTIPATION: 0
VOMITING: 0
DEPRESSION: 0
DIZZINESS: 0
SORE THROAT: 0
BACK PAIN: 1
DIARRHEA: 0
CHILLS: 0

## 2022-06-14 NOTE — PROGRESS NOTES
Pt is oriented x 4.  No complaints of pain.  Repositioned for comfort.  Call light within reach.

## 2022-06-14 NOTE — DISCHARGE PLANNING
Case Management Discharge Planning    Admission Date: 5/14/2022  GMLOS: 4.8  ALOS: 31    6-Clicks ADL Score: 18  6-Clicks Mobility Score: 14      Anticipated Discharge Dispo: Discharge Disposition: D/T to hospice home (50)        Action(s) Taken: Updated Provider/Nurse on Discharge Plan    1) Spoke with caregiver Elicia.  She reports she has agreed with patient that she will go to her home for the long term.  However, the room available will not be ready for 2- 4 weeks.    2) Spoke with patient at bedside.  Discussed conversation with Elicia.  Patient reports she will agree to dc to the [facility] whos manager Carissa visited her here last week with [facility] whilst awaiting the room in [facility].     3) She asked me to call and update Elicia with the plan which I did. She declines me calling to update anyone else.     4) Spoke with Carissa Manager at [facility].  She reports she can accept patient when HOSSEIN is agreed.     5) Spoke with Jo Liu.  She reports they will arranged transport and hospice intake when [facility] is arranged.     6) Patient is pending medicaid screening.       Escalations Completed: Leadership  Faxed request form for HOSSEIN  Medically Clear: Yes    Next Steps: Follow up on HOSSEIN    Barriers to Discharge: Pending Placement    Is the patient up for discharge tomorrow: No

## 2022-06-14 NOTE — CARE PLAN
Problem: Knowledge Deficit - Standard  Goal: Patient and family/care givers will demonstrate understanding of plan of care, disease process/condition, diagnostic tests and medications  Outcome: Progressing     Problem: Pain - Standard  Goal: Alleviation of pain or a reduction in pain to the patient’s comfort goal  Outcome: Progressing     Problem: Knowledge Deficit - Comfort Care  Goal: Patient and family/care givers will demonstrate understanding of dying process and grieving  Outcome: Progressing   The patient is Watcher - Medium risk of patient condition declining or worsening    Shift Goals  Clinical Goals: comfort  Patient Goals: comfort and rest  Family Goals: MORALES    Progress made toward(s) clinical / shift goals:  Pt denies pain.  Pt and family state understanding of disease process.    Patient is not progressing towards the following goals:

## 2022-06-14 NOTE — PROGRESS NOTES
Hospital Medicine Daily Progress Note    Date of Service  6/14/2022    Chief Complaint  Maral Herrera is a 56 y.o. female admitted 5/14/2022 with epistaxis    Hospital Course  No notes on file    Ms. Maral Herrera is a 56 y.o. female with history of brain tumor, polysubstance abuse (methamphetamine, alcohol, marijuana), heart failure with reduced ejection fraction of 15-20% assumed to be secondary to methamphetamine abuse, cirrhosis status post TIPS, history of blood clots on rivaroxaban who presented on 5/14/2022 with epistaxis, shock, supratherapeutic INR.  Anticoagulation was reversed with Kcentra.  Rivaroxaban was held.  Cultures grew ESBL E. coli bacteremia as well as in the urine culture.  The patient did have septic shock which resolved with fluids and antibiotics.  Infectious disease was following.  She completed course of meropenem.  Due to the patient's multiple underlying conditions including brain tumor, severe cardiomyopathy with LVEF of 15%, cirrhosis (meld score of about 40), resulting in guarded prognosis, patient decided to pursue hospice with transition to comfort care.      Interval Problem Update  Patient was seen and examined at bedside.  I have personally reviewed and interpreted vitals, labs, and imaging.    6/7.  Afebrile.  Stable vitals.  On room air.  Patient is sleepy and lethargic.  Reports no acute complaints.  Awaiting placement with hospice.  Accepted by [facility].  Discussed with case management.  Disposition possibly home with caretaker Azar vs SNF  6/8.  Afebrile.  Stable vitals.  On room air.  Patient is very lethargic.  No acute complaints.  Mother would like to pay SNF and become POA.  Apparently patient previously said did not discuss case with mother.  She also has a caretaker Elicia who said that she did take the patient home in a few days but has not been answering phone calls.  Apparently her caretaker also takes care of a significant other  6/9.  Afebrile.  Has been  hypotensive.  On room air.  Patient slightly more awake today.  She is alert and oriented.  Denies fevers, chills, chest pains, shortness of breath.  Patient states that she would rather go home with her caretaker Elicia rather than being put in a SNF by her mom.  6/10.  Afebrile.  Stable vitals.  On room air.  Patient much more awake today.  Alert and oriented.  Friend is at bedside.  Denies fever, chills, chest pains, shortness of breath.  She does report some low back pain, which she states is chronic.  Patient would like to go home with caretaker Elicia who we have not been able to get a hold of.  6/11.  Afebrile.  Stable vitals.  On room air.  Awake and alert.  Alert and oriented x3.  Denies fever, chills, chest pains, shortness of breath.  Complains of low back pain which has been chronic.  Still prefers to go home with Elicia.  Has not been able to get in touch with her but she will try again today.  6/12.  Afebrile.  Stable vitals.  On room air.  Denies fevers, chills, chest pains, shortness of breath.  Reports chronic back pain.  Patient was able to get a hold of Elicia.  She reports that Elicia her caretaker is setting up in room for her and one of her other roommates is moving out so the patient can move in hopefully soon  6/13.  Afebrile.  Stable vitals.  On room air.  Denies fevers, chills, chest pains, shortness of breath.  Complains of back pain and headache.  Started on Lidoderm patch and muscle relaxants.  Patient prefers to go home with Elicia but it may take 2 weeks home to be set up.  6/14.  Afebrile.  Stable vitals.  On room air.  Patient lethargic this morning.  Continues to report back pain and headache but does state pain medication and Lidoderm patches are working.  Going home with caretaker room without the available for 2 weeks.  We will see if patient will be agreeable to group home in the interim.    I have personally seen and examined the patient at bedside. I discussed the plan of care with  patient, bedside RN and .    Consultants/Specialty  infectious disease, palliative care and psychiatry    Code Status  DNAR/DNI    Disposition 20  Patient is medically cleared for discharge.   Anticipate discharge to to hospice.  I have placed the appropriate orders for post-discharge needs.    Review of Systems  Review of Systems   Constitutional: Negative for chills and fever.   HENT: Negative for congestion and sore throat.    Eyes: Negative for blurred vision.   Respiratory: Negative for cough and shortness of breath.    Cardiovascular: Negative for chest pain, palpitations and leg swelling.   Gastrointestinal: Negative for abdominal pain, constipation, diarrhea, nausea and vomiting.   Genitourinary: Negative for dysuria, frequency and urgency.   Musculoskeletal: Positive for back pain.   Skin: Negative for rash.   Neurological: Positive for headaches. Negative for dizziness and weakness.   Psychiatric/Behavioral: Negative for depression. The patient is not nervous/anxious.    All other systems reviewed and are negative.       Physical Exam  Temp:  [36.4 °C (97.6 °F)-37 °C (98.6 °F)] 36.7 °C (98 °F)  Pulse:  [65-89] 65  Resp:  [18] 18  BP: (105-120)/(64-80) 105/76  SpO2:  [90 %-98 %] 90 %    Physical Exam  Vitals and nursing note reviewed.   Constitutional:       General: She is sleeping.      Appearance: She is ill-appearing.   HENT:      Head: Normocephalic and atraumatic.      Right Ear: External ear normal.      Left Ear: External ear normal.      Nose: Nose normal.      Mouth/Throat:      Mouth: Mucous membranes are dry.      Pharynx: Oropharynx is clear.   Eyes:      Extraocular Movements: Extraocular movements intact.      Conjunctiva/sclera: Conjunctivae normal.   Cardiovascular:      Rate and Rhythm: Normal rate and regular rhythm.      Pulses: Normal pulses.      Heart sounds: Normal heart sounds. No murmur heard.  Pulmonary:      Effort: Pulmonary effort is normal. No respiratory distress.       Breath sounds: Normal breath sounds. No stridor. No wheezing or rales.   Abdominal:      General: Abdomen is flat. Bowel sounds are normal. There is no distension.      Palpations: Abdomen is soft. There is no mass.      Tenderness: There is no abdominal tenderness.   Musculoskeletal:      Cervical back: Normal range of motion.   Skin:     General: Skin is warm.      Capillary Refill: Capillary refill takes less than 2 seconds.   Neurological:      General: No focal deficit present.      Mental Status: She is oriented to person, place, and time. Mental status is at baseline. She is lethargic.      Cranial Nerves: No cranial nerve deficit.   Psychiatric:      Comments: Difficult to assess as patient is minimally verbal.         Fluids    Intake/Output Summary (Last 24 hours) at 6/14/2022 0617  Last data filed at 6/14/2022 0329  Gross per 24 hour   Intake 295 ml   Output 700 ml   Net -405 ml       Laboratory                        Imaging  DX-CHEST-PORTABLE (1 VIEW)   Final Result      No radiographic evidence of acute pulmonary tuberculosis.      DX-CHEST-PORTABLE (1 VIEW)   Final Result         1.  No acute cardiopulmonary disease.   2.  Cardiomegaly   3.  Atherosclerosis           Assessment/Plan  * Epistaxis- (present on admission)  Assessment & Plan  Resolved.  INR was elevated on admission.  On comfort care    Bacteremia  Assessment & Plan  Completed antibiotic course for ESBL E.Coli bacteremia. 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 Awaiting Hospice arrangements    Acute cystitis without hematuria  Assessment & Plan  Completed antibiotic course    Hyponatremia- (present on admission)  Assessment & Plan  Secondary to cirrhosis.  On comfort care     Septic shock (HCC)  Assessment & Plan  Resolved.  Secondary to E. coli ESBL bacteremia and UTI.  Now on comfort care, pending placement for hospice    Supratherapeutic INR- (present on  admission)  Assessment & Plan  INR was supratherapeutic on admission, likely secondary to liver failure. Improved.    Anemia- (present on admission)  Assessment & Plan  Comfort Care    Advance care planning- (present on admission)  Assessment & Plan  CODE STATUS was obtained. Patient is DNR/DNI. On comfort care.    Thrombocytopenia (HCC)- (present on admission)  Assessment & Plan  Likely secondary to liver cirrhosis.  On comfort care, awaiting placement for hospice.    Severe protein-calorie malnutrition (HCC)- (present on admission)  Assessment & Plan  On comfort care    Dilated cardiomyopathy (HCC)- (present on admission)  Assessment & Plan  Secondary to substance abuse.  Echocardiogram from 4/11/2022 showed LVEF of ~ 15-20%. On comfort Care    Methamphetamine abuse (HCC)- (present on admission)  Assessment & Plan  On comfort care  Counseled about lifestyle changes.    Liver cirrhosis (HCC)- (present on admission)  Assessment & Plan  Latest meld score was about 40.  Comfort care       VTE prophylaxis: pharmacologic prophylaxis contraindicated due to Comfort care    I have performed a physical exam and reviewed and updated ROS and Plan today (6/14/2022). In review of yesterday's note (6/13/2022), there are no changes except as documented above.

## 2022-06-15 LAB
FLUAV RNA SPEC QL NAA+PROBE: NEGATIVE
FLUBV RNA SPEC QL NAA+PROBE: NEGATIVE
SARS-COV-2 RNA RESP QL NAA+PROBE: NOTDETECTED
SPECIMEN SOURCE: NORMAL

## 2022-06-15 PROCEDURE — A9270 NON-COVERED ITEM OR SERVICE: HCPCS | Performed by: STUDENT IN AN ORGANIZED HEALTH CARE EDUCATION/TRAINING PROGRAM

## 2022-06-15 PROCEDURE — 700102 HCHG RX REV CODE 250 W/ 637 OVERRIDE(OP): Performed by: INTERNAL MEDICINE

## 2022-06-15 PROCEDURE — 770004 HCHG ROOM/CARE - ONCOLOGY PRIVATE *

## 2022-06-15 PROCEDURE — 700101 HCHG RX REV CODE 250: Performed by: INTERNAL MEDICINE

## 2022-06-15 PROCEDURE — 700102 HCHG RX REV CODE 250 W/ 637 OVERRIDE(OP)

## 2022-06-15 PROCEDURE — A9270 NON-COVERED ITEM OR SERVICE: HCPCS

## 2022-06-15 PROCEDURE — A9270 NON-COVERED ITEM OR SERVICE: HCPCS | Performed by: INTERNAL MEDICINE

## 2022-06-15 PROCEDURE — 700102 HCHG RX REV CODE 250 W/ 637 OVERRIDE(OP): Performed by: STUDENT IN AN ORGANIZED HEALTH CARE EDUCATION/TRAINING PROGRAM

## 2022-06-15 PROCEDURE — 99231 SBSQ HOSP IP/OBS SF/LOW 25: CPT | Performed by: INTERNAL MEDICINE

## 2022-06-15 RX ADMIN — OXYCODONE HYDROCHLORIDE 10 MG: 10 TABLET ORAL at 08:20

## 2022-06-15 RX ADMIN — METHOCARBAMOL 500 MG: 500 TABLET ORAL at 08:20

## 2022-06-15 RX ADMIN — LIDOCAINE 1 PATCH: 50 PATCH TOPICAL at 10:15

## 2022-06-15 RX ADMIN — METHOCARBAMOL 500 MG: 500 TABLET ORAL at 12:27

## 2022-06-15 RX ADMIN — METHOCARBAMOL 500 MG: 500 TABLET ORAL at 17:01

## 2022-06-15 RX ADMIN — METHOCARBAMOL 500 MG: 500 TABLET ORAL at 21:33

## 2022-06-15 RX ADMIN — OXYCODONE HYDROCHLORIDE 10 MG: 10 TABLET ORAL at 19:59

## 2022-06-15 ASSESSMENT — ENCOUNTER SYMPTOMS
VOMITING: 0
PALPITATIONS: 0
WEAKNESS: 0
CONSTIPATION: 0
SHORTNESS OF BREATH: 0
DIARRHEA: 0
SORE THROAT: 0
DIZZINESS: 0
CHILLS: 0
NAUSEA: 0
DEPRESSION: 0
COUGH: 0
BLURRED VISION: 0
HEADACHES: 1
FEVER: 0
ABDOMINAL PAIN: 0
NERVOUS/ANXIOUS: 0
BACK PAIN: 1

## 2022-06-15 ASSESSMENT — PAIN DESCRIPTION - PAIN TYPE
TYPE: ACUTE PAIN
TYPE: ACUTE PAIN

## 2022-06-15 NOTE — DISCHARGE PLANNING
Case Management Discharge Planning    Admission Date: 5/14/2022  GMLOS: 4.8  ALOS: 32    6-Clicks ADL Score: 18  6-Clicks Mobility Score: 14    Has referral(s) been sent to post-acute provider:  Yes      Anticipated Discharge Dispo: Discharge Disposition: D/T to Group Home with hospice  hospice.    DME Needed: No  Hospice will order and required DME    Action(s) Taken: Met  patients, mother Diane and brother at bedside.  Maral states she would like them to be a part of the dc conversation. Long conversation re [facility] following up on prior conversation with Maral, that would be a fill in whilst she waits for her room to be ready with [facility]. They are all in agreement for Maral to go to [facility] and Maral signed HELIO to enable limited medical information to be shared with [facility].  Gave Maral and her mother the number for [facility] to ask any specific questions they may have.   Maral gives permission for me to follow up with her mother.     Escalations Completed: Pending Discharge Destination    Medically Clear: Yes    Next Steps: Pending response fron Manager Ashley for decision on HOSSEIN for [facility].     Barriers to Discharge: Pending Placement    Is the patient up for discharge tomorrow: No

## 2022-06-15 NOTE — PROGRESS NOTES
Hospital Medicine Daily Progress Note    Date of Service  6/15/2022    Chief Complaint  Maral Herrera is a 56 y.o. female admitted 5/14/2022 with epistaxis    Hospital Course  No notes on file    Ms. Maral Herrera is a 56 y.o. female with history of brain tumor, polysubstance abuse (methamphetamine, alcohol, marijuana), heart failure with reduced ejection fraction of 15-20% assumed to be secondary to methamphetamine abuse, cirrhosis status post TIPS, history of blood clots on rivaroxaban who presented on 5/14/2022 with epistaxis, shock, supratherapeutic INR.  Anticoagulation was reversed with Kcentra.  Rivaroxaban was held.  Cultures grew ESBL E. coli bacteremia as well as in the urine culture.  The patient did have septic shock which resolved with fluids and antibiotics.  Infectious disease was following.  She completed course of meropenem.  Due to the patient's multiple underlying conditions including brain tumor, severe cardiomyopathy with LVEF of 15%, cirrhosis (meld score of about 40), resulting in guarded prognosis, patient decided to pursue hospice with transition to comfort care.      Interval Problem Update  Patient was seen and examined at bedside.  I have personally reviewed and interpreted vitals, labs, and imaging.    6/7.  Afebrile.  Stable vitals.  On room air.  Patient is sleepy and lethargic.  Reports no acute complaints.  Awaiting placement with hospice.  Accepted by [facility].  Discussed with case management.  Disposition possibly home with caretaker Azar vs SNF  6/8.  Afebrile.  Stable vitals.  On room air.  Patient is very lethargic.  No acute complaints.  Mother would like to pay SNF and become POA.  Apparently patient previously said did not discuss case with mother.  She also has a caretaker Elicia who said that she did take the patient home in a few days but has not been answering phone calls.  Apparently her caretaker also takes care of a significant other  6/9.  Afebrile.  Has been  hypotensive.  On room air.  Patient slightly more awake today.  She is alert and oriented.  Denies fevers, chills, chest pains, shortness of breath.  Patient states that she would rather go home with her caretaker Elicia rather than being put in a SNF by her mom.  6/10.  Afebrile.  Stable vitals.  On room air.  Patient much more awake today.  Alert and oriented.  Friend is at bedside.  Denies fever, chills, chest pains, shortness of breath.  She does report some low back pain, which she states is chronic.  Patient would like to go home with caretaker Elicia who we have not been able to get a hold of.  6/11.  Afebrile.  Stable vitals.  On room air.  Awake and alert.  Alert and oriented x3.  Denies fever, chills, chest pains, shortness of breath.  Complains of low back pain which has been chronic.  Still prefers to go home with Elicia.  Has not been able to get in touch with her but she will try again today.  6/12.  Afebrile.  Stable vitals.  On room air.  Denies fevers, chills, chest pains, shortness of breath.  Reports chronic back pain.  Patient was able to get a hold of Elicia.  She reports that Elicia her caretaker is setting up in room for her and one of her other roommates is moving out so the patient can move in hopefully soon  6/13.  Afebrile.  Stable vitals.  On room air.  Denies fevers, chills, chest pains, shortness of breath.  Complains of back pain and headache.  Started on Lidoderm patch and muscle relaxants.  Patient prefers to go home with Elicia but it may take 2 weeks home to be set up.  6/14.  Afebrile.  Stable vitals.  On room air.  Patient lethargic this morning.  Continues to report back pain and headache but does state pain medication and Lidoderm patches are working.  Going home with caretaker room without the available for 2 weeks.  We will see if patient will be agreeable to group home in the interim.  6/15.  Afebrile.  Episode of hypotension this morning.  On room air.  Denies fever, chills,  chest pains, shortness of breath.  Complains of chronic low back pain.  Will look for group home placement for the 2 weeks prior to room being ready to move in with her caretaker Elicai.    I have personally seen and examined the patient at bedside. I discussed the plan of care with patient, bedside RN and .    Consultants/Specialty  infectious disease, palliative care and psychiatry    Code Status  DNAR/DNI    Disposition 20  Patient is medically cleared for discharge.   Anticipate discharge to to hospice.  I have placed the appropriate orders for post-discharge needs.    Review of Systems  Review of Systems   Constitutional: Negative for chills and fever.   HENT: Negative for congestion and sore throat.    Eyes: Negative for blurred vision.   Respiratory: Negative for cough and shortness of breath.    Cardiovascular: Negative for chest pain, palpitations and leg swelling.   Gastrointestinal: Negative for abdominal pain, constipation, diarrhea, nausea and vomiting.   Genitourinary: Negative for dysuria, frequency and urgency.   Musculoskeletal: Positive for back pain.   Skin: Negative for rash.   Neurological: Positive for headaches. Negative for dizziness and weakness.   Psychiatric/Behavioral: Negative for depression. The patient is not nervous/anxious.    All other systems reviewed and are negative.       Physical Exam  Temp:  [36.1 °C (97 °F)-36.9 °C (98.5 °F)] 36.9 °C (98.5 °F)  Pulse:  [71-84] 84  Resp:  [18] 18  BP: ()/(64-69) 98/64  SpO2:  [91 %-94 %] 91 %    Physical Exam  Vitals and nursing note reviewed.   Constitutional:       General: She is sleeping.      Appearance: She is ill-appearing.   HENT:      Head: Normocephalic and atraumatic.      Right Ear: External ear normal.      Left Ear: External ear normal.      Nose: Nose normal.      Mouth/Throat:      Mouth: Mucous membranes are dry.      Pharynx: Oropharynx is clear.   Eyes:      Extraocular Movements: Extraocular movements  intact.      Conjunctiva/sclera: Conjunctivae normal.   Cardiovascular:      Rate and Rhythm: Normal rate and regular rhythm.      Pulses: Normal pulses.      Heart sounds: Normal heart sounds. No murmur heard.  Pulmonary:      Effort: Pulmonary effort is normal. No respiratory distress.      Breath sounds: Normal breath sounds. No stridor. No wheezing or rales.   Abdominal:      General: Abdomen is flat. Bowel sounds are normal. There is no distension.      Palpations: Abdomen is soft. There is no mass.      Tenderness: There is no abdominal tenderness.   Musculoskeletal:      Cervical back: Normal range of motion.   Skin:     General: Skin is warm.      Capillary Refill: Capillary refill takes less than 2 seconds.   Neurological:      General: No focal deficit present.      Mental Status: She is oriented to person, place, and time. Mental status is at baseline. She is lethargic.      Cranial Nerves: No cranial nerve deficit.   Psychiatric:      Comments: Difficult to assess as patient is minimally verbal.         Fluids  No intake or output data in the 24 hours ending 06/15/22 0605    Laboratory                        Imaging  DX-CHEST-PORTABLE (1 VIEW)   Final Result      No radiographic evidence of acute pulmonary tuberculosis.      DX-CHEST-PORTABLE (1 VIEW)   Final Result         1.  No acute cardiopulmonary disease.   2.  Cardiomegaly   3.  Atherosclerosis           Assessment/Plan  * Epistaxis- (present on admission)  Assessment & Plan  Resolved.  INR was elevated on admission.  On comfort care    Bacteremia  Assessment & Plan  Completed antibiotic course for ESBL E.Coli bacteremia. 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 Awaiting Hospice arrangements    Acute cystitis without hematuria  Assessment & Plan  Completed antibiotic course    Hyponatremia- (present on admission)  Assessment & Plan  Secondary to cirrhosis.  On comfort care      Septic shock (HCC)  Assessment & Plan  Resolved.  Secondary to E. coli ESBL bacteremia and UTI.  Now on comfort care, pending placement for hospice    Supratherapeutic INR- (present on admission)  Assessment & Plan  INR was supratherapeutic on admission, likely secondary to liver failure. Improved.    Anemia- (present on admission)  Assessment & Plan  Comfort Care    Advance care planning- (present on admission)  Assessment & Plan  CODE STATUS was obtained. Patient is DNR/DNI. On comfort care.    Thrombocytopenia (HCC)- (present on admission)  Assessment & Plan  Likely secondary to liver cirrhosis.  On comfort care, awaiting placement for hospice.    Severe protein-calorie malnutrition (HCC)- (present on admission)  Assessment & Plan  On comfort care    Dilated cardiomyopathy (HCC)- (present on admission)  Assessment & Plan  Secondary to substance abuse.  Echocardiogram from 4/11/2022 showed LVEF of ~ 15-20%. On comfort Care    Methamphetamine abuse (HCC)- (present on admission)  Assessment & Plan  On comfort care  Counseled about lifestyle changes.    Liver cirrhosis (HCC)- (present on admission)  Assessment & Plan  Latest meld score was about 40.  Comfort care       VTE prophylaxis: pharmacologic prophylaxis contraindicated due to Comfort care    I have performed a physical exam and reviewed and updated ROS and Plan today (6/15/2022). In review of yesterday's note (6/14/2022), there are no changes except as documented above.

## 2022-06-15 NOTE — CARE PLAN
The patient is Stable - Low risk of patient condition declining or worsening    Shift Goals  Clinical Goals: comfort measures, covid swab  Patient Goals: pain control, rest  Family Goals:     Progress made toward(s) clinical / shift goals:      Problem: Pain - Standard  Goal: Alleviation of pain or a reduction in pain to the patient’s comfort goal  Outcome: Progressing     Problem: Fall Risk  Goal: Patient will remain free from falls  Outcome: Progressing     Problem: Knowledge Deficit - Comfort Care  Goal: Patient and family/care givers will demonstrate understanding of dying process and grieving  Outcome: Progressing       Patient is Aox4. Pt is 1 person assist. Pt's pain well managed with current regimen and heat packs applied. Pt has a good appetite and pleasant demeanor, calls for assistance appropriately.

## 2022-06-15 NOTE — DISCHARGE PLANNING
Case Management Discharge Planning    Admission Date: 5/14/2022  GMLOS: 4.8  ALOS: 32    6-Clicks ADL Score: 18  6-Clicks Mobility Score: 14        Anticipated Discharge Dispo: Discharge Disposition: D/T to Group home with hospice    Action(s) Taken: HOSSEIN request with Manager Ashley for review.  Pending.     Escalations Completed: Pending Discharge Destination    Medically Clear: Yes    Next Steps: Folow up with group home and hospice when signed HOSSEIN available.     Barriers to Discharge: Pending Placement  Pending covid result.   Is the patient up for discharge tomorrow: No      12PM  Communicated with Broer Ashley Storey re HOSSEIN request.  Response pending.

## 2022-06-15 NOTE — CARE PLAN
Problem: Knowledge Deficit - Comfort Care  Goal: Patient and family/care givers will demonstrate understanding of dying process and grieving  Outcome: Progressing     Problem: Psychosocial - Comfort Care  Goal: Patient's level of anxiety will decrease  Outcome: Progressing     Problem: Respiratory - Comfort Care  Goal: Patient's respiratory function will be supported  Outcome: Progressing   The patient is Stable - Low risk of patient condition declining or worsening    Shift Goals  Clinical Goals: comfort care  Patient Goals: comfort  Family Goals: MORALES    Progress made toward(s) clinical / shift goals:  comfort care measure in place    Patient is not progressing towards the following goals:

## 2022-06-16 PROCEDURE — 700102 HCHG RX REV CODE 250 W/ 637 OVERRIDE(OP): Performed by: INTERNAL MEDICINE

## 2022-06-16 PROCEDURE — 770004 HCHG ROOM/CARE - ONCOLOGY PRIVATE *

## 2022-06-16 PROCEDURE — A9270 NON-COVERED ITEM OR SERVICE: HCPCS | Performed by: INTERNAL MEDICINE

## 2022-06-16 PROCEDURE — 99231 SBSQ HOSP IP/OBS SF/LOW 25: CPT | Performed by: INTERNAL MEDICINE

## 2022-06-16 RX ADMIN — METHOCARBAMOL 500 MG: 500 TABLET ORAL at 09:05

## 2022-06-16 RX ADMIN — METHOCARBAMOL 500 MG: 500 TABLET ORAL at 20:43

## 2022-06-16 RX ADMIN — METHOCARBAMOL 500 MG: 500 TABLET ORAL at 17:34

## 2022-06-16 RX ADMIN — LORAZEPAM 0.5 MG: 0.5 TABLET ORAL at 22:03

## 2022-06-16 RX ADMIN — METHOCARBAMOL 500 MG: 500 TABLET ORAL at 12:40

## 2022-06-16 RX ADMIN — FUROSEMIDE 20 MG: 20 TABLET ORAL at 04:51

## 2022-06-16 ASSESSMENT — ENCOUNTER SYMPTOMS
WEAKNESS: 0
COUGH: 0
SORE THROAT: 0
NERVOUS/ANXIOUS: 0
DIZZINESS: 0
FEVER: 0
BLURRED VISION: 0
VOMITING: 0
SHORTNESS OF BREATH: 0
BACK PAIN: 1
CONSTIPATION: 0
NAUSEA: 0
DIARRHEA: 0
DEPRESSION: 0
CHILLS: 0
ABDOMINAL PAIN: 0
PALPITATIONS: 0
HEADACHES: 1

## 2022-06-16 ASSESSMENT — PAIN DESCRIPTION - PAIN TYPE
TYPE: ACUTE PAIN
TYPE: ACUTE PAIN

## 2022-06-16 NOTE — CARE PLAN
The patient is Watcher - Medium risk of patient condition declining or worsening    Shift Goals  Clinical Goals: comfort, pain control  Patient Goals: comfort  Family Goals: MORALES    Progress made toward(s) clinical / shift goals:      Problem: Knowledge Deficit - Standard  Goal: Patient and family/care givers will demonstrate understanding of plan of care, disease process/condition, diagnostic tests and medications  Outcome: Progressing  Note: A/Ox3, disoriented to time,  pt is able to understand plan of care. All questions answered at the moment.       Problem: Pain - Standard  Goal: Alleviation of pain or a reduction in pain to the patient’s comfort goal  Outcome: Progressing  Note: PRN pain medications given per MAR working effectively to promote comfort.        Problem: Fall Risk  Goal: Patient will remain free from falls  Outcome: Progressing  Note: Fall precautions in place, bed in lowest position, call light and belongings in reach.   Pt calls appropriately.       Patient is not progressing towards the following goals:

## 2022-06-16 NOTE — PROGRESS NOTES
Hospital Medicine Daily Progress Note    Date of Service  6/16/2022    Chief Complaint  Maral Herrera is a 56 y.o. female admitted 5/14/2022 with epistaxis    Hospital Course  No notes on file    Ms. Maral Herrera is a 56 y.o. female with history of brain tumor, polysubstance abuse (methamphetamine, alcohol, marijuana), heart failure with reduced ejection fraction of 15-20% assumed to be secondary to methamphetamine abuse, cirrhosis status post TIPS, history of blood clots on rivaroxaban who presented on 5/14/2022 with epistaxis, shock, supratherapeutic INR.  Anticoagulation was reversed with Kcentra.  Rivaroxaban was held.  Cultures grew ESBL E. coli bacteremia as well as in the urine culture.  The patient did have septic shock which resolved with fluids and antibiotics.  Infectious disease was following.  She completed course of meropenem.  Due to the patient's multiple underlying conditions including brain tumor, severe cardiomyopathy with LVEF of 15%, cirrhosis (meld score of about 40), resulting in guarded prognosis, patient decided to pursue hospice with transition to comfort care.      Interval Problem Update  Patient was seen and examined at bedside.  I have personally reviewed and interpreted vitals, labs, and imaging.    6/7.  Afebrile.  Stable vitals.  On room air.  Patient is sleepy and lethargic.  Reports no acute complaints.  Awaiting placement with hospice.  Accepted by [facility].  Discussed with case management.  Disposition possibly home with caretaker Azar vs SNF  6/8.  Afebrile.  Stable vitals.  On room air.  Patient is very lethargic.  No acute complaints.  Mother would like to pay SNF and become POA.  Apparently patient previously said did not discuss case with mother.  She also has a caretaker Elicia who said that she did take the patient home in a few days but has not been answering phone calls.  Apparently her caretaker also takes care of a significant other  6/9.  Afebrile.  Has been  hypotensive.  On room air.  Patient slightly more awake today.  She is alert and oriented.  Denies fevers, chills, chest pains, shortness of breath.  Patient states that she would rather go home with her caretaker Elicia rather than being put in a SNF by her mom.  6/10.  Afebrile.  Stable vitals.  On room air.  Patient much more awake today.  Alert and oriented.  Friend is at bedside.  Denies fever, chills, chest pains, shortness of breath.  She does report some low back pain, which she states is chronic.  Patient would like to go home with caretaker Elicia who we have not been able to get a hold of.  6/11.  Afebrile.  Stable vitals.  On room air.  Awake and alert.  Alert and oriented x3.  Denies fever, chills, chest pains, shortness of breath.  Complains of low back pain which has been chronic.  Still prefers to go home with Elicia.  Has not been able to get in touch with her but she will try again today.  6/12.  Afebrile.  Stable vitals.  On room air.  Denies fevers, chills, chest pains, shortness of breath.  Reports chronic back pain.  Patient was able to get a hold of Elicia.  She reports that Elicia her caretaker is setting up in room for her and one of her other roommates is moving out so the patient can move in hopefully soon  6/13.  Afebrile.  Stable vitals.  On room air.  Denies fevers, chills, chest pains, shortness of breath.  Complains of back pain and headache.  Started on Lidoderm patch and muscle relaxants.  Patient prefers to go home with Elicia but it may take 2 weeks home to be set up.  6/14.  Afebrile.  Stable vitals.  On room air.  Patient lethargic this morning.  Continues to report back pain and headache but does state pain medication and Lidoderm patches are working.  Going home with caretaker room without the available for 2 weeks.  We will see if patient will be agreeable to group home in the interim.  6/15.  Afebrile.  Episode of hypotension this morning.  On room air.  Denies fever, chills,  chest pains, shortness of breath.  Complains of chronic low back pain.  Will look for group home placement for the 2 weeks prior to room being ready to move in with her caretaker Elicia.  6/16.  Afebrile.  Intermittent hypotension. On room air.  Back pain is well controlled.  Accepted by [facility].  Pending group home placement.    I have personally seen and examined the patient at bedside. I discussed the plan of care with patient, bedside RN and .    Consultants/Specialty  infectious disease, palliative care and psychiatry    Code Status  DNAR/DNI    Disposition 20  Patient is medically cleared for discharge.   Anticipate discharge to to hospice.  I have placed the appropriate orders for post-discharge needs.    Review of Systems  Review of Systems   Constitutional: Negative for chills and fever.   HENT: Negative for congestion and sore throat.    Eyes: Negative for blurred vision.   Respiratory: Negative for cough and shortness of breath.    Cardiovascular: Negative for chest pain, palpitations and leg swelling.   Gastrointestinal: Negative for abdominal pain, constipation, diarrhea, nausea and vomiting.   Genitourinary: Negative for dysuria, frequency and urgency.   Musculoskeletal: Positive for back pain.   Skin: Negative for rash.   Neurological: Positive for headaches. Negative for dizziness and weakness.   Psychiatric/Behavioral: Negative for depression. The patient is not nervous/anxious.    All other systems reviewed and are negative.       Physical Exam  Temp:  [36.1 °C (96.9 °F)-36.7 °C (98.1 °F)] 36.1 °C (96.9 °F)  Pulse:  [64-90] 64  Resp:  [16-18] 16  BP: ()/(43-82) 99/43  SpO2:  [93 %-97 %] 97 %    Physical Exam  Vitals and nursing note reviewed.   Constitutional:       General: She is sleeping.      Appearance: She is ill-appearing.   HENT:      Head: Normocephalic and atraumatic.      Right Ear: External ear normal.      Left Ear: External ear normal.      Nose: Nose normal.       Mouth/Throat:      Mouth: Mucous membranes are dry.      Pharynx: Oropharynx is clear.   Eyes:      Extraocular Movements: Extraocular movements intact.      Conjunctiva/sclera: Conjunctivae normal.   Cardiovascular:      Rate and Rhythm: Normal rate and regular rhythm.      Pulses: Normal pulses.      Heart sounds: Normal heart sounds. No murmur heard.  Pulmonary:      Effort: Pulmonary effort is normal. No respiratory distress.      Breath sounds: Normal breath sounds. No stridor. No wheezing or rales.   Abdominal:      General: Abdomen is flat. Bowel sounds are normal. There is no distension.      Palpations: Abdomen is soft. There is no mass.      Tenderness: There is no abdominal tenderness.   Musculoskeletal:      Cervical back: Normal range of motion.   Skin:     General: Skin is warm.      Capillary Refill: Capillary refill takes less than 2 seconds.   Neurological:      General: No focal deficit present.      Mental Status: She is oriented to person, place, and time. Mental status is at baseline. She is lethargic.      Cranial Nerves: No cranial nerve deficit.   Psychiatric:      Comments: Difficult to assess as patient is minimally verbal.         Fluids    Intake/Output Summary (Last 24 hours) at 6/16/2022 0617  Last data filed at 6/16/2022 0600  Gross per 24 hour   Intake --   Output 1000 ml   Net -1000 ml       Laboratory                        Imaging  DX-CHEST-PORTABLE (1 VIEW)   Final Result      No radiographic evidence of acute pulmonary tuberculosis.      DX-CHEST-PORTABLE (1 VIEW)   Final Result         1.  No acute cardiopulmonary disease.   2.  Cardiomegaly   3.  Atherosclerosis           Assessment/Plan  * Epistaxis- (present on admission)  Assessment & Plan  Resolved.  INR was elevated on admission.  On comfort care    Bacteremia  Assessment & Plan  Completed antibiotic course for ESBL E.Coli bacteremia. 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 Awaiting Hospice arrangements    Acute cystitis without hematuria  Assessment & Plan  Completed antibiotic course    Hyponatremia- (present on admission)  Assessment & Plan  Secondary to cirrhosis.  On comfort care     Septic shock (HCC)  Assessment & Plan  Resolved.  Secondary to E. coli ESBL bacteremia and UTI.  Now on comfort care, pending placement for hospice    Supratherapeutic INR- (present on admission)  Assessment & Plan  INR was supratherapeutic on admission, likely secondary to liver failure. Improved.    Anemia- (present on admission)  Assessment & Plan  Comfort Care    Advance care planning- (present on admission)  Assessment & Plan  CODE STATUS was obtained. Patient is DNR/DNI. On comfort care.    Thrombocytopenia (HCC)- (present on admission)  Assessment & Plan  Likely secondary to liver cirrhosis.  On comfort care, awaiting placement for hospice.    Severe protein-calorie malnutrition (HCC)- (present on admission)  Assessment & Plan  On comfort care    Dilated cardiomyopathy (HCC)- (present on admission)  Assessment & Plan  Secondary to substance abuse.  Echocardiogram from 4/11/2022 showed LVEF of ~ 15-20%. On comfort Care    Methamphetamine abuse (HCC)- (present on admission)  Assessment & Plan  On comfort care  Counseled about lifestyle changes.    Liver cirrhosis (HCC)- (present on admission)  Assessment & Plan  Latest meld score was about 40.  Comfort care       VTE prophylaxis: pharmacologic prophylaxis contraindicated due to Comfort care    I have performed a physical exam and reviewed and updated ROS and Plan today (6/16/2022). In review of yesterday's note (6/15/2022), there are no changes except as documented above.

## 2022-06-16 NOTE — DISCHARGE PLANNING
Case Management Discharge Planning    Admission Date: 5/14/2022  GMLOS: 4.8  ALOS: 33    6-Clicks ADL Score: 18  6-Clicks Mobility Score: 14        Anticipated Discharge Dispo: Discharge Disposition: D/T to Group home with hospce DME Needed: Yes    DME Ordered: No    Action(s) Taken: Updated Provider/Nurse on Discharge Plan    Escalations Completed: Pending Discharge Destination      Medically Clear: Yes    Next Steps:     [facility]  [facility] and referral sent to her for her review. Message left requesting call back.      2)  Spoke with Abena  [facility] .  She reports plan for intake tomorrow pending group home final acceptance.     Barriers to Discharge: Pending Placement

## 2022-06-17 VITALS
SYSTOLIC BLOOD PRESSURE: 105 MMHG | HEART RATE: 69 BPM | OXYGEN SATURATION: 92 % | BODY MASS INDEX: 25.73 KG/M2 | RESPIRATION RATE: 18 BRPM | WEIGHT: 159.39 LBS | DIASTOLIC BLOOD PRESSURE: 64 MMHG | TEMPERATURE: 98.2 F

## 2022-06-17 PROBLEM — R65.21 SEPTIC SHOCK (HCC): Status: RESOLVED | Noted: 2022-05-15 | Resolved: 2022-06-17

## 2022-06-17 PROBLEM — D33.0 BENIGN NEOPLASM OF SUPRATENTORIAL REGION OF BRAIN (HCC): Status: RESOLVED | Noted: 2022-05-15 | Resolved: 2022-06-17

## 2022-06-17 PROBLEM — N30.00 ACUTE CYSTITIS WITHOUT HEMATURIA: Status: RESOLVED | Noted: 2022-05-15 | Resolved: 2022-06-17

## 2022-06-17 PROBLEM — A41.9 SEPTIC SHOCK (HCC): Status: RESOLVED | Noted: 2022-05-15 | Resolved: 2022-06-17

## 2022-06-17 PROBLEM — R04.0 EPISTAXIS: Status: RESOLVED | Noted: 2022-05-14 | Resolved: 2022-06-17

## 2022-06-17 PROBLEM — E87.1 HYPONATREMIA: Status: RESOLVED | Noted: 2022-05-15 | Resolved: 2022-06-17

## 2022-06-17 PROBLEM — R79.1 SUPRATHERAPEUTIC INR: Status: RESOLVED | Noted: 2022-05-15 | Resolved: 2022-06-17

## 2022-06-17 PROBLEM — R78.81 BACTEREMIA: Status: RESOLVED | Noted: 2022-05-17 | Resolved: 2022-06-17

## 2022-06-17 PROBLEM — D64.9 ANEMIA: Status: RESOLVED | Noted: 2022-05-15 | Resolved: 2022-06-17

## 2022-06-17 PROBLEM — D69.6 THROMBOCYTOPENIA (HCC): Status: RESOLVED | Noted: 2022-04-10 | Resolved: 2022-06-17

## 2022-06-17 PROCEDURE — A9270 NON-COVERED ITEM OR SERVICE: HCPCS | Performed by: INTERNAL MEDICINE

## 2022-06-17 PROCEDURE — 700101 HCHG RX REV CODE 250: Performed by: INTERNAL MEDICINE

## 2022-06-17 PROCEDURE — 700102 HCHG RX REV CODE 250 W/ 637 OVERRIDE(OP): Performed by: INTERNAL MEDICINE

## 2022-06-17 PROCEDURE — 90832 PSYTX W PT 30 MINUTES: CPT | Performed by: SOCIAL WORKER

## 2022-06-17 PROCEDURE — 99239 HOSP IP/OBS DSCHRG MGMT >30: CPT | Performed by: INTERNAL MEDICINE

## 2022-06-17 RX ORDER — LORAZEPAM 0.5 MG/1
0.5 TABLET ORAL EVERY 4 HOURS PRN
Qty: 10 TABLET | Refills: 0
Start: 2022-06-17 | End: 2022-06-27

## 2022-06-17 RX ORDER — CHOLECALCIFEROL (VITAMIN D3) 125 MCG
5 CAPSULE ORAL NIGHTLY
Status: DISCONTINUED | OUTPATIENT
Start: 2022-06-17 | End: 2022-06-17 | Stop reason: HOSPADM

## 2022-06-17 RX ORDER — CHOLECALCIFEROL (VITAMIN D3) 125 MCG
5 CAPSULE ORAL NIGHTLY
Qty: 30 TABLET | Refills: 0
Start: 2022-06-17

## 2022-06-17 RX ORDER — ECHINACEA PURPUREA EXTRACT 125 MG
2 TABLET ORAL
Qty: 15 ML | Refills: 3
Start: 2022-06-17

## 2022-06-17 RX ORDER — SCOLOPAMINE TRANSDERMAL SYSTEM 1 MG/1
1 PATCH, EXTENDED RELEASE TRANSDERMAL
Qty: 4 PATCH | Refills: 3
Start: 2022-06-19

## 2022-06-17 RX ORDER — METHOCARBAMOL 500 MG/1
500 TABLET, FILM COATED ORAL 4 TIMES DAILY
Qty: 120 TABLET | Refills: 0
Start: 2022-06-17

## 2022-06-17 RX ORDER — OXYCODONE HYDROCHLORIDE 5 MG/1
5 TABLET ORAL
Qty: 10 TABLET | Refills: 0
Start: 2022-06-17 | End: 2022-06-22

## 2022-06-17 RX ADMIN — LIDOCAINE 1 PATCH: 50 PATCH TOPICAL at 10:06

## 2022-06-17 RX ADMIN — LORAZEPAM 0.5 MG: 0.5 TABLET ORAL at 10:38

## 2022-06-17 RX ADMIN — OXYCODONE HYDROCHLORIDE 10 MG: 10 TABLET ORAL at 10:06

## 2022-06-17 RX ADMIN — LORAZEPAM 0.5 MG: 0.5 TABLET ORAL at 14:57

## 2022-06-17 RX ADMIN — METHOCARBAMOL 500 MG: 500 TABLET ORAL at 10:06

## 2022-06-17 RX ADMIN — METHOCARBAMOL 500 MG: 500 TABLET ORAL at 12:54

## 2022-06-17 RX ADMIN — OXYCODONE HYDROCHLORIDE 10 MG: 10 TABLET ORAL at 14:29

## 2022-06-17 RX ADMIN — FUROSEMIDE 20 MG: 20 TABLET ORAL at 04:31

## 2022-06-17 ASSESSMENT — PAIN DESCRIPTION - PAIN TYPE
TYPE: ACUTE PAIN
TYPE: ACUTE PAIN

## 2022-06-17 NOTE — DISCHARGE SUMMARY
Discharge Summary    CHIEF COMPLAINT ON ADMISSION  Chief Complaint   Patient presents with   • Epistaxis     Pt sent from [facility] for evaluation of epistaxis        Reason for Admission  EMS     Admission Date  5/14/2022    CODE STATUS  DNAR/DNI    HPI & HOSPITAL COURSE  Ms. Maral Herrera is a 56 y.o. female with history of brain tumor, polysubstance abuse (methamphetamine, alcohol, marijuana), heart failure with reduced ejection fraction of 15-20% assumed to be secondary to methamphetamine abuse, cirrhosis status post TIPS, history of blood clots on rivaroxaban who presented on 5/14/2022 with epistaxis, shock, supratherapeutic INR.  Anticoagulation was reversed with Kcentra.  Rivaroxaban was held.  Cultures grew ESBL E. coli bacteremia as well as in the urine culture.  The patient did have septic shock which resolved with fluids and antibiotics.  Infectious disease was following.  She completed course of meropenem.  Due to the patient's multiple underlying conditions including brain tumor, severe cardiomyopathy with LVEF of 15%, cirrhosis (meld score of about 40), resulting in guarded prognosis, patient decided to pursue hospice with transition to comfort care.  Her care taker Elicia is setting up a room to take her home.  In the interim She is agreeable to go to a group [place] until her care taker is ready to accept her back home.  She was accepted by [facility] with [facility].  Medically stable to discharge home with hospice      Therefore, she is discharged in guarded and stable condition to hospice.    The patient met 2-midnight criteria for an inpatient stay at the time of discharge.    Discharge Date  6/17/2022    FOLLOW UP ITEMS POST DISCHARGE  None    DISCHARGE DIAGNOSES  Principal Problem (Resolved):    Epistaxis POA: Yes  Active Problems:    Liver cirrhosis (HCC) POA: Yes    Methamphetamine abuse (HCC) POA: Yes    Dilated cardiomyopathy (HCC) POA: Yes    Severe  protein-calorie malnutrition (HCC) POA: Yes    Advance care planning POA: Yes    Benign neoplasm of supratentorial region of brain (HCC) POA: Yes  Resolved Problems:    Macrocytosis POA: Yes    Thrombocytopenia (HCC) POA: Yes    Prolonged Q-T interval on ECG POA: Yes    Anemia POA: Yes    Supratherapeutic INR POA: Yes    Septic shock (HCC) POA: Yes    Hyponatremia POA: Yes    Acute cystitis without hematuria POA: Yes    Bacteremia POA: Yes      FOLLOW UP  [facility]  [address] [facility] [address]  [facility] [phone]  Follow up        MEDICATIONS ON DISCHARGE     Medication List      START taking these medications      Instructions   LORazepam 0.5 MG Tabs  Commonly known as: ATIVAN   Take 1 Tablet by mouth every four hours as needed for Anxiety for up to 10 days.  Dose: 0.5 mg     melatonin 5 mg Tabs   Take 1 Tablet by mouth every evening.  Dose: 5 mg     methocarbamol 500 MG Tabs  Commonly known as: ROBAXIN   Take 1 Tablet by mouth 4 times a day.  Dose: 500 mg     oxyCODONE immediate-release 5 MG Tabs  Commonly known as: ROXICODONE   Take 1 Tablet by mouth every 3 hours as needed for Severe Pain for up to 5 days.  Dose: 5 mg     scopolamine 1 mg/72hr Pt72  Start taking on: June 19, 2022  Commonly known as: TRANSDERM-SCOP   Place 1 Patch on the skin every 72 hours.  Dose: 1 Patch     sodium chloride 0.65 % Soln  Commonly known as: OCEAN   Administer 2 Sprays into affected nostril(S) every 2 hours as needed for Congestion.  Dose: 2 Spray        CONTINUE taking these medications      Instructions   furosemide 20 MG Tabs  Commonly known as: LASIX   Take 1 Tablet by mouth every day.  Dose: 20 mg     lidocaine 5 % Ptch  Commonly known as: LIDODERM   Place 1 Patch on the skin every 24 hours as needed (back pain).  Dose: 1 Patch        STOP taking these medications    acetaminophen 325 MG Tabs  Commonly known as: Tylenol     aspirin 81 MG EC tablet     atorvastatin 20 MG Tabs  Commonly known as:  "LIPITOR     budesonide-formoterol 80-4.5 MCG/ACT Aero  Commonly known as: SYMBICORT     fluticasone 50 MCG/ACT nasal spray  Commonly known as: FLONASE     guaiFENesin  MG Tb12  Commonly known as: MUCINEX     ibuprofen 600 MG Tabs  Commonly known as: MOTRIN     lisinopril 5 MG Tabs  Commonly known as: PRINIVIL     loratadine 10 MG Tabs  Commonly known as: CLARITIN     metoprolol SR 25 MG Tb24  Commonly known as: TOPROL XL     rivaroxaban 10 MG Tabs tablet  Commonly known as: Xarelto     spironolactone 25 MG Tabs  Commonly known as: ALDACTONE     tiotropium 2.5 mcg/Act Aers  Commonly known as: Spiriva Respimat     traZODone 50 MG Tabs  Commonly known as: DESYREL            Allergies  No Known Allergies    DIET  Orders Placed This Encounter   Procedures   • Diet Order Diet: Cardiac (\"re-firing\" per Graciela, not showing on her end.); Second Modifier: (optional): 2 Gram Sodium; Fluid modifications: (optional): 1500 ml Fluid Restriction     Standing Status:   Standing     Number of Occurrences:   1     Order Specific Question:   Diet:     Answer:   Cardiac [6]     Comments:   \"re-firing\" per Graciela, not showing on her end.     Order Specific Question:   Second Modifier: (optional)     Answer:   2 Gram Sodium [7]     Order Specific Question:   Fluid modifications: (optional)     Answer:   1500 ml Fluid Restriction [9]       ACTIVITY  As tolerated.  Weight bearing as tolerated    CONSULTATIONS  infectious disease, palliative care and psychiatry    PROCEDURES  None    LABORATORY  Lab Results   Component Value Date    SODIUM 127 (L) 05/19/2022    POTASSIUM 4.5 05/19/2022    CHLORIDE 99 05/19/2022    CO2 22 05/19/2022    GLUCOSE 148 (H) 05/19/2022    BUN 15 05/19/2022    CREATININE 0.61 05/19/2022        Lab Results   Component Value Date    WBC 4.1 (L) 05/19/2022    HEMOGLOBIN 9.8 (L) 05/19/2022    HEMATOCRIT 28.7 (L) 05/19/2022    PLATELETCT 91 (L) 05/19/2022        I discussed medications and side effects with the " patient.  I discussed prognosis and importance of medical compliance with the patient.  I counseled the patient about diet, exercise, weight loss, smoking cessation, and life style modifications.  All questions and concerns have been addressed.  Total time of the discharge process was 38 minutes.

## 2022-06-17 NOTE — CARE PLAN
The patient is Watcher - Medium risk of patient condition declining or worsening    Shift Goals  Clinical Goals: comfort  Patient Goals: comfort, eat  Family Goals: MORALES    Progress made toward(s) clinical / shift goals:      Problem: Knowledge Deficit - Standard  Goal: Patient and family/care givers will demonstrate understanding of plan of care, disease process/condition, diagnostic tests and medications  Outcome: Progressing  Note: A/Ox4, pt is able to understand plan of care. All questions answered at the moment.       Problem: Skin Integrity  Goal: Skin integrity is maintained or improved  Outcome: Progressing  Note: Pt is able to turn self in bed  Purewick on/frequent rounding in place to ensure pt remains dry.     Problem: Fall Risk  Goal: Patient will remain free from falls  Outcome: Progressing  Note: Fall precautions in place, bed in lowest position, bed alarm on,  call light and belongings in reach.   Pt calls appropriately.      Patient is not progressing towards the following goals:

## 2022-06-17 NOTE — CONSULTS
"Brief Behavioral Health Care Note:    Length of Intervention: 30 minutes    Psychotherapy Session Summary: Patient seen sleeping in hospital bed. Patient was easy to arouse, presented with a pleasant demeanor and was willingly engaged in the interview process.  Throughout the session, Patient was observed crying  Intermittently stating, \"I feel so anxious all the time, I can't control it\". Clinical interventions included encouraging patient to practice the relaxation techniques learned for calming. Patient was able to recall and practice these techniques and successfully controlled her anxiety and was able to re-engage in the session.  Patient shared her fears regarding going to a new facility. Clinician assisted patient in developing a self advocacy plan to ask for help when needed. Additionally, we anticipated types of situations in which asking for help would be necessary. Patient acknowledged that she recognizes safety cues and understands various methods of asking for help when needed. Patient was able to practice problem solving techniques with minimal cues. Patient was able to practice critical thinking skills to assist in problem solving while effectively managing her anxiety.    Thank you for the opportunity to work with this patient.    Izabella Bermeo, Ph.D., [facility]  "

## 2022-06-17 NOTE — PROGRESS NOTES
0700- 1930    VSS on room air. Denies pain. BM x1. Tolerating diet without nausea. Purewick in place with good urine output. Up with 1 and a FWW to bedside commode. Safety maintained.

## 2022-06-17 NOTE — CARE PLAN
The patient is Watcher - Medium risk of patient condition declining or worsening    Shift Goals  Clinical Goals: Comfort  Patient Goals: Comfort  Family Goals: MORALES    Progress made toward(s) clinical / shift goals:    Problem: Pain - Standard  Goal: Alleviation of pain or a reduction in pain to the patient’s comfort goal  Outcome: Progressing     Problem: Skin Integrity  Goal: Skin integrity is maintained or improved  Outcome: Progressing     Problem: Mobility  Goal: Patient's capacity to carry out activities will improve  Outcome: Progressing       Patient is not progressing towards the following goals:      Problem: Psychosocial - Comfort Care  Goal: Patient's level of anxiety will decrease  Outcome: Not Progressing  Note: Pt is extremely anxious about being transferred to her new group home, support given.

## 2022-06-17 NOTE — DISCHARGE PLANNING
Case Management Discharge Planning    Admission Date: 5/14/2022  GMLOS: 4.8  ALOS: 34    6-Clicks ADL Score: 18  6-Clicks Mobility Score: 14      Anticipated Discharge Dispo: Discharge Disposition: D/T to hospice home (50)    DME Needed: Pending with [facility]    Action(s) Taken: Updated Provider/Nurse on Discharge Plan    Escalations Completed: Leadership  For HOSSEIN sign off.   Medically Clear: Yes    Next Steps: [facility]   .    Barriers to Discharge: Pending Placement    Is the patient up for discharge tomorrow: No.  [facility] arranging transport to [facility] pending final HOSSEIN sign off.       0980  HOSSEIN signed off by  Director Antionette Tobias, Carissa Appiah Manager [facility] and by patient and  Copy provided to each and sent to  [facility] for addition to chart.    Spoke with [facility] .  She has arranged transport with [facility] for 1300. She reports DME in situ and hospice intake scheduled for 14:00- 15:00 today    Spoke with Dr Woodward, charge nurse Nithya and bedside RN Paola to advise of all of the above.           Addendum:  Called and spoke with patients mother Diane to advise of plan at [phone]

## 2022-07-31 NOTE — CARE PLAN
No new care gaps identified.  [facility] Embedded Care Gaps. Reference number: [id number]. 7/31/2022   7:08:36 AM AUDIT   The patient is Watcher - Medium risk of patient condition declining or worsening    Shift Goals manage pain and O2 saturation  Clinical Goals: Manage pain and O2  Patient Goals: rest  Family Goals: n/a    Progress made toward(s) clinical / shift goals:    Problem: Fall Risk  Goal: Patient will remain free from falls  Outcome: Progressing   Reynolds nery fall scale utilized. Bed alarm in place. Pt is a two person assist pivot to chair. Educated pt and family to call for appropriate assistance prior to ambulating.   Problem: Pain - Standard  Goal: Alleviation of pain or a reduction in pain to the patient’s comfort goal  Outcome: Progressing  Pt educated on pain scale. RN aware of pt's goal pain. PRN medication orders followed.     Patient is not progressing towards the following goals:
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
